# Patient Record
Sex: FEMALE | Race: WHITE | NOT HISPANIC OR LATINO | Employment: UNEMPLOYED | URBAN - METROPOLITAN AREA
[De-identification: names, ages, dates, MRNs, and addresses within clinical notes are randomized per-mention and may not be internally consistent; named-entity substitution may affect disease eponyms.]

---

## 2017-02-27 ENCOUNTER — HOSPITAL ENCOUNTER (OUTPATIENT)
Dept: FAMILY MEDICINE CLINIC | Facility: CLINIC | Age: 57
Setting detail: SPECIMEN
Discharge: HOME | End: 2017-02-27
Attending: NURSE PRACTITIONER | Admitting: NURSE PRACTITIONER

## 2017-02-27 LAB
ALBUMIN SERPL-MCNC: 3.8 G/DL (ref 3.5–4.8)
ALBUMIN/GLOB SERPL: 1.6 {RATIO} (ref 1–1.7)
ALP SERPL-CCNC: 75 IU/L (ref 32–91)
ALT SERPL-CCNC: 26 IU/L (ref 14–54)
ANION GAP SERPL CALC-SCNC: 15.8 MMOL/L (ref 10–20)
AST SERPL-CCNC: 32 IU/L (ref 15–41)
BASOPHILS # BLD AUTO: 0.1 10*3/UL (ref 0–0.2)
BASOPHILS NFR BLD AUTO: 1 % (ref 0–2)
BILIRUB SERPL-MCNC: 0.6 MG/DL (ref 0.3–1.2)
BUN SERPL-MCNC: 9 MG/DL (ref 8–20)
BUN/CREAT SERPL: 11.3 (ref 5.4–26.2)
CALCIUM SERPL-MCNC: 9.5 MG/DL (ref 8.9–10.3)
CHLORIDE SERPL-SCNC: 100 MMOL/L (ref 101–111)
CHOLEST SERPL-MCNC: 156 MG/DL
CHOLEST/HDLC SERPL: 1.8 {RATIO}
CONV CO2: 28 MMOL/L (ref 22–32)
CONV LDL CHOLESTEROL DIRECT: 43 MG/DL (ref 0–100)
CONV TOTAL PROTEIN: 6.2 G/DL (ref 6.1–7.9)
CREAT UR-MCNC: 0.8 MG/DL (ref 0.4–1)
DIFFERENTIAL METHOD BLD: (no result)
EOSINOPHIL # BLD AUTO: 0.2 10*3/UL (ref 0–0.3)
EOSINOPHIL # BLD AUTO: 3 % (ref 0–3)
ERYTHROCYTE [DISTWIDTH] IN BLOOD BY AUTOMATED COUNT: 13.3 % (ref 11.5–14.5)
GLOBULIN UR ELPH-MCNC: 2.4 G/DL (ref 2.5–3.8)
GLUCOSE SERPL-MCNC: 101 MG/DL (ref 65–99)
HCT VFR BLD AUTO: 44.6 % (ref 35–49)
HDLC SERPL-MCNC: 86 MG/DL
HGB BLD-MCNC: 15 G/DL (ref 12–15)
LDLC/HDLC SERPL: 0.5 {RATIO}
LIPID INTERPRETATION: ABNORMAL
LYMPHOCYTES # BLD AUTO: 2.4 10*3/UL (ref 0.8–4.8)
LYMPHOCYTES NFR BLD AUTO: 28 % (ref 18–42)
MAGNESIUM SERPL-MCNC: 1.9 MG/DL (ref 1.8–2.5)
MCH RBC QN AUTO: 32.3 PG (ref 26–32)
MCHC RBC AUTO-ENTMCNC: 33.5 G/DL (ref 32–36)
MCV RBC AUTO: 96.3 FL (ref 80–94)
MONOCYTES # BLD AUTO: 0.7 10*3/UL (ref 0.1–1.3)
MONOCYTES NFR BLD AUTO: 9 % (ref 2–11)
NEUTROPHILS # BLD AUTO: 5.2 10*3/UL (ref 2.3–8.6)
NEUTROPHILS NFR BLD AUTO: 59 % (ref 50–75)
NRBC BLD AUTO-RTO: 0 /100{WBCS}
NRBC/RBC NFR BLD MANUAL: 0 10*3/UL
PLATELET # BLD AUTO: 140 10*3/UL (ref 150–450)
PMV BLD AUTO: 9.9 FL (ref 7.4–10.4)
POTASSIUM SERPL-SCNC: 4.8 MMOL/L (ref 3.6–5.1)
RBC # BLD AUTO: 4.63 10*6/UL (ref 4–5.4)
SODIUM SERPL-SCNC: 139 MMOL/L (ref 136–144)
TRIGL SERPL-MCNC: 111 MG/DL
VIT B12 SERPL-MCNC: >1500 PG/ML (ref 180–914)
VLDLC SERPL CALC-MCNC: 27.3 MG/DL
WBC # BLD AUTO: 8.6 10*3/UL (ref 4.5–11.5)

## 2019-10-31 RX ORDER — TRAZODONE HYDROCHLORIDE 150 MG/1
TABLET ORAL EVERY 24 HOURS
COMMUNITY
Start: 2018-06-19 | End: 2020-05-21 | Stop reason: SDUPTHER

## 2019-10-31 RX ORDER — TRAZODONE HYDROCHLORIDE 150 MG/1
150 TABLET ORAL EVERY 24 HOURS
OUTPATIENT
Start: 2019-10-31

## 2020-05-13 ENCOUNTER — HOSPITAL ENCOUNTER (EMERGENCY)
Facility: HOSPITAL | Age: 60
Discharge: HOME | End: 2020-05-13
Admitting: EMERGENCY MEDICINE

## 2020-05-13 VITALS
OXYGEN SATURATION: 100 % | BODY MASS INDEX: 16.57 KG/M2 | DIASTOLIC BLOOD PRESSURE: 77 MMHG | SYSTOLIC BLOOD PRESSURE: 133 MMHG | HEIGHT: 65 IN | RESPIRATION RATE: 17 BRPM | TEMPERATURE: 97.8 F | HEART RATE: 99 BPM | WEIGHT: 99.43 LBS

## 2020-05-13 DIAGNOSIS — S16.1XXA ACUTE STRAIN OF NECK MUSCLE, INITIAL ENCOUNTER: Primary | ICD-10-CM

## 2020-05-13 PROCEDURE — 99283 EMERGENCY DEPT VISIT LOW MDM: CPT

## 2020-05-13 PROCEDURE — 96374 THER/PROPH/DIAG INJ IV PUSH: CPT

## 2020-05-13 PROCEDURE — 25010000002 KETOROLAC TROMETHAMINE PER 15 MG: Performed by: PHYSICIAN ASSISTANT

## 2020-05-13 PROCEDURE — 25010000002 METHYLPREDNISOLONE PER 125 MG: Performed by: PHYSICIAN ASSISTANT

## 2020-05-13 PROCEDURE — 96375 TX/PRO/DX INJ NEW DRUG ADDON: CPT

## 2020-05-13 RX ORDER — METHYLPREDNISOLONE 4 MG/1
TABLET ORAL
Qty: 21 TABLET | Refills: 0 | Status: SHIPPED | OUTPATIENT
Start: 2020-05-13 | End: 2020-05-21

## 2020-05-13 RX ORDER — KETOROLAC TROMETHAMINE 15 MG/ML
15 INJECTION, SOLUTION INTRAMUSCULAR; INTRAVENOUS ONCE
Status: COMPLETED | OUTPATIENT
Start: 2020-05-13 | End: 2020-05-13

## 2020-05-13 RX ORDER — METHOCARBAMOL 750 MG/1
750 TABLET, FILM COATED ORAL 3 TIMES DAILY
Qty: 45 TABLET | Refills: 0 | Status: SHIPPED | OUTPATIENT
Start: 2020-05-13 | End: 2020-05-21

## 2020-05-13 RX ORDER — LIDOCAINE 50 MG/G
1 PATCH TOPICAL EVERY 24 HOURS
Qty: 30 PATCH | Refills: 0 | Status: SHIPPED | OUTPATIENT
Start: 2020-05-13 | End: 2020-08-04

## 2020-05-13 RX ORDER — DICLOFENAC SODIUM 75 MG/1
75 TABLET, DELAYED RELEASE ORAL 2 TIMES DAILY
Qty: 60 TABLET | Refills: 0 | Status: SHIPPED | OUTPATIENT
Start: 2020-05-13 | End: 2020-05-21

## 2020-05-13 RX ORDER — SODIUM CHLORIDE 0.9 % (FLUSH) 0.9 %
10 SYRINGE (ML) INJECTION AS NEEDED
Status: DISCONTINUED | OUTPATIENT
Start: 2020-05-13 | End: 2020-05-13 | Stop reason: HOSPADM

## 2020-05-13 RX ORDER — METHYLPREDNISOLONE SODIUM SUCCINATE 125 MG/2ML
125 INJECTION, POWDER, LYOPHILIZED, FOR SOLUTION INTRAMUSCULAR; INTRAVENOUS ONCE
Status: COMPLETED | OUTPATIENT
Start: 2020-05-13 | End: 2020-05-13

## 2020-05-13 RX ORDER — LIDOCAINE 50 MG/G
1 PATCH TOPICAL ONCE
Status: DISCONTINUED | OUTPATIENT
Start: 2020-05-13 | End: 2020-05-13 | Stop reason: HOSPADM

## 2020-05-13 RX ORDER — METHOCARBAMOL 500 MG/1
500 TABLET, FILM COATED ORAL ONCE
Status: COMPLETED | OUTPATIENT
Start: 2020-05-13 | End: 2020-05-13

## 2020-05-13 RX ADMIN — LIDOCAINE 1 PATCH: 50 PATCH TOPICAL at 13:42

## 2020-05-13 RX ADMIN — KETOROLAC TROMETHAMINE 15 MG: 15 INJECTION, SOLUTION INTRAMUSCULAR; INTRAVENOUS at 13:42

## 2020-05-13 RX ADMIN — METHYLPREDNISOLONE SODIUM SUCCINATE 125 MG: 125 INJECTION, POWDER, FOR SOLUTION INTRAMUSCULAR; INTRAVENOUS at 13:42

## 2020-05-13 RX ADMIN — METHOCARBAMOL TABLETS 500 MG: 500 TABLET, COATED ORAL at 13:42

## 2020-05-13 NOTE — ED NOTES
Patient c/o left arm pain starting at her neck and radiating to fingers. States that it feels like her arm is going to sleep with a sporadic sharp pain down arm. States this has been happening for approximately 2 weeks.      Melina Sosa RN  05/13/20 0537

## 2020-05-13 NOTE — ED PROVIDER NOTES
Subjective   History: Patient is a 60-year-old female who presents to the ER with left arm pain radiating from her left neck.  She reports she has a history of degenerative disc disease.  She reports she was cleaning 2 weeks ago and feels like she tweaked it.  She reports is tender to touch she has been having her roommate rub it without significant relief.  She also reports it is worse with certain movements.      Onset: 2 weeks  Location: left side neck  Duration: constant  Character: sharp pain   Aggravating/Alleviating factors: worse with certain movements  Radiation Left arm  Severity: moderate            Review of Systems   Constitutional: Negative for chills, fatigue and fever.   HENT: Negative.    Respiratory: Negative for cough and shortness of breath.    Cardiovascular: Negative for chest pain, palpitations and leg swelling.   Gastrointestinal: Negative for abdominal pain, nausea and vomiting.   Genitourinary: Negative.    Musculoskeletal: Positive for neck pain.   Skin: Negative.    Neurological: Negative.    Psychiatric/Behavioral: Negative.        No past medical history on file.    No Known Allergies    No past surgical history on file.    No family history on file.    Social History     Socioeconomic History   • Marital status: Legally      Spouse name: Not on file   • Number of children: Not on file   • Years of education: Not on file   • Highest education level: Not on file           Objective   Physical Exam   Constitutional: She is oriented to person, place, and time. She appears well-developed and well-nourished.   HENT:   Head: Normocephalic and atraumatic.   Eyes: Pupils are equal, round, and reactive to light.   Neck: Normal range of motion.   Cardiovascular: Normal rate and regular rhythm.   Pulmonary/Chest: Effort normal and breath sounds normal.   Musculoskeletal: Normal range of motion.   Increase in pain with palpation over left scapular region and left-sided neck.  Increase in  pain with ROM of left arm.  Subjective numbness to left hand. Negative Hunt's sign   Neurological: She is alert and oriented to person, place, and time.   Skin: Skin is warm and dry.   Psychiatric: She has a normal mood and affect. Her behavior is normal.       Procedures           ED Course    No radiology results for the last day  Labs Reviewed - No data to display  Medications   sodium chloride 0.9 % flush 10 mL (has no administration in time range)   lidocaine (LIDODERM) 5 % 1 patch (1 patch Transdermal Medication Applied 5/13/20 1342)   methylPREDNISolone sodium succinate (SOLU-Medrol) injection 125 mg (125 mg Intravenous Given 5/13/20 1342)   ketorolac (TORADOL) injection 15 mg (15 mg Intravenous Given 5/13/20 1342)   methocarbamol (ROBAXIN) tablet 500 mg (500 mg Oral Given 5/13/20 1342)                                            MDM  Number of Diagnoses or Management Options  Acute strain of neck muscle, initial encounter:   Diagnosis management comments: I examined the patient using the appropriate personal protective equipment.      DISPOSITION:   Chart Review:  Comorbidity:  has no past medical history on file.  Differentials:this list is not all inclusive and does not constitute the entirety of considered causes --> cervical strain, acute exacerbation of chronic neck pain, acute fracture    Imaging: Was interpreted by physician and reviewed by myself:  No radiology results for the last day    Disposition/Treatment:    Patient is a 60-year-old female who presents to the ER with neck pain after cleaning 2 weeks ago she has been taking over-the-counter ibuprofen and Tylenol without relief.  Patient was given IV Solu-Medrol Toradol and Robaxin her pain went out from a 10 out of 10 to a 4 out of 10.  Initially ordered imaging but with her significant relief in pain she felt comfortable going home.  Patient was discharged home with diclofenac Robaxin lidocaine patches and Medrol Dosepak return precaution  follow-up stress provided she was stable at time of discharge agreement with plan    Patient Progress  Patient progress: stable      Final diagnoses:   Acute strain of neck muscle, initial encounter            Rosita Palmer, GALLO  05/13/20 1700

## 2020-05-13 NOTE — DISCHARGE INSTRUCTIONS
Return to the ER for any worsening symptoms.  Follow-up with your primary care doctor for further pain management.  Alternate ice and heat every 15 minutes if helpful.  Do not take other NSAIDs while taking diclofenac.

## 2020-05-21 ENCOUNTER — OFFICE VISIT (OUTPATIENT)
Dept: FAMILY MEDICINE CLINIC | Facility: CLINIC | Age: 60
End: 2020-05-21

## 2020-05-21 VITALS
HEIGHT: 65 IN | WEIGHT: 97.8 LBS | HEART RATE: 94 BPM | OXYGEN SATURATION: 96 % | DIASTOLIC BLOOD PRESSURE: 85 MMHG | TEMPERATURE: 97.3 F | SYSTOLIC BLOOD PRESSURE: 131 MMHG | BODY MASS INDEX: 16.29 KG/M2

## 2020-05-21 DIAGNOSIS — E78.2 MIXED HYPERLIPIDEMIA: ICD-10-CM

## 2020-05-21 DIAGNOSIS — M47.23 OSTEOARTHRITIS OF SPINE WITH RADICULOPATHY, CERVICOTHORACIC REGION: Primary | ICD-10-CM

## 2020-05-21 DIAGNOSIS — M81.8 OTHER OSTEOPOROSIS, UNSPECIFIED PATHOLOGICAL FRACTURE PRESENCE: ICD-10-CM

## 2020-05-21 DIAGNOSIS — Z12.31 BREAST CANCER SCREENING BY MAMMOGRAM: ICD-10-CM

## 2020-05-21 DIAGNOSIS — G47.09 OTHER INSOMNIA: ICD-10-CM

## 2020-05-21 DIAGNOSIS — Z12.11 COLON CANCER SCREENING: ICD-10-CM

## 2020-05-21 DIAGNOSIS — Z00.00 PREVENTATIVE HEALTH CARE: ICD-10-CM

## 2020-05-21 DIAGNOSIS — G56.92 NEUROPATHY, ARM, LEFT: ICD-10-CM

## 2020-05-21 PROCEDURE — 80053 COMPREHEN METABOLIC PANEL: CPT | Performed by: NURSE PRACTITIONER

## 2020-05-21 PROCEDURE — 96372 THER/PROPH/DIAG INJ SC/IM: CPT | Performed by: NURSE PRACTITIONER

## 2020-05-21 PROCEDURE — 80061 LIPID PANEL: CPT | Performed by: NURSE PRACTITIONER

## 2020-05-21 PROCEDURE — 99214 OFFICE O/P EST MOD 30 MIN: CPT | Performed by: NURSE PRACTITIONER

## 2020-05-21 PROCEDURE — 85027 COMPLETE CBC AUTOMATED: CPT | Performed by: NURSE PRACTITIONER

## 2020-05-21 PROCEDURE — 84443 ASSAY THYROID STIM HORMONE: CPT | Performed by: NURSE PRACTITIONER

## 2020-05-21 RX ORDER — HYDROCODONE BITARTRATE AND ACETAMINOPHEN 5; 325 MG/1; MG/1
1 TABLET ORAL EVERY 6 HOURS PRN
Qty: 30 TABLET | Refills: 0 | Status: SHIPPED | OUTPATIENT
Start: 2020-05-21 | End: 2020-05-27 | Stop reason: SDUPTHER

## 2020-05-21 RX ORDER — DICLOFENAC SODIUM 75 MG/1
75 TABLET, DELAYED RELEASE ORAL 2 TIMES DAILY
Qty: 60 TABLET | Refills: 0 | Status: SHIPPED | OUTPATIENT
Start: 2020-05-21 | End: 2020-06-07 | Stop reason: SDUPTHER

## 2020-05-21 RX ORDER — METHOCARBAMOL 750 MG/1
750 TABLET, FILM COATED ORAL 2 TIMES DAILY PRN
Qty: 40 TABLET | Refills: 0 | Status: SHIPPED | OUTPATIENT
Start: 2020-05-21 | End: 2020-05-21

## 2020-05-21 RX ORDER — GABAPENTIN 300 MG/1
CAPSULE ORAL
Qty: 90 CAPSULE | Refills: 0 | Status: SHIPPED | OUTPATIENT
Start: 2020-05-21 | End: 2020-05-26 | Stop reason: SDUPTHER

## 2020-05-21 RX ORDER — TRAZODONE HYDROCHLORIDE 150 MG/1
150 TABLET ORAL NIGHTLY PRN
Qty: 90 TABLET | Refills: 1 | Status: SHIPPED | OUTPATIENT
Start: 2020-05-21 | End: 2020-10-14

## 2020-05-21 RX ORDER — METHOCARBAMOL 750 MG/1
750 TABLET, FILM COATED ORAL 2 TIMES DAILY PRN
Qty: 40 TABLET | Refills: 0 | Status: SHIPPED | OUTPATIENT
Start: 2020-05-21 | End: 2020-05-26 | Stop reason: SDUPTHER

## 2020-05-21 RX ORDER — METHYLPREDNISOLONE SODIUM SUCCINATE 125 MG/2ML
125 INJECTION, POWDER, LYOPHILIZED, FOR SOLUTION INTRAMUSCULAR; INTRAVENOUS EVERY 6 HOURS
Status: DISCONTINUED | OUTPATIENT
Start: 2020-05-21 | End: 2020-05-21

## 2020-05-21 RX ORDER — DICLOFENAC SODIUM 75 MG/1
75 TABLET, DELAYED RELEASE ORAL 2 TIMES DAILY
Qty: 60 TABLET | Refills: 0 | Status: SHIPPED | OUTPATIENT
Start: 2020-05-21 | End: 2020-05-21

## 2020-05-21 RX ORDER — CALCIUM CARBONATE 600 MG
1 TABLET ORAL 2 TIMES DAILY
COMMUNITY
Start: 2020-03-05 | End: 2020-06-08

## 2020-05-21 RX ORDER — ATORVASTATIN CALCIUM 20 MG/1
20 TABLET, FILM COATED ORAL
Qty: 90 TABLET | Refills: 1 | Status: SHIPPED | OUTPATIENT
Start: 2020-05-21 | End: 2020-12-23

## 2020-05-21 RX ORDER — METHYLPREDNISOLONE SODIUM SUCCINATE 125 MG/2ML
125 INJECTION, POWDER, LYOPHILIZED, FOR SOLUTION INTRAMUSCULAR; INTRAVENOUS ONCE
Status: COMPLETED | OUTPATIENT
Start: 2020-05-21 | End: 2020-05-21

## 2020-05-21 RX ORDER — ATORVASTATIN CALCIUM 20 MG/1
20 TABLET, FILM COATED ORAL
COMMUNITY
Start: 2020-03-08 | End: 2020-05-21 | Stop reason: SDUPTHER

## 2020-05-21 RX ADMIN — METHYLPREDNISOLONE SODIUM SUCCINATE 125 MG: 125 INJECTION, POWDER, LYOPHILIZED, FOR SOLUTION INTRAMUSCULAR; INTRAVENOUS at 14:07

## 2020-05-22 ENCOUNTER — RESULTS ENCOUNTER (OUTPATIENT)
Dept: FAMILY MEDICINE CLINIC | Facility: CLINIC | Age: 60
End: 2020-05-22

## 2020-05-22 ENCOUNTER — TELEPHONE (OUTPATIENT)
Dept: FAMILY MEDICINE CLINIC | Facility: CLINIC | Age: 60
End: 2020-05-22

## 2020-05-22 DIAGNOSIS — Z12.11 COLON CANCER SCREENING: ICD-10-CM

## 2020-05-22 LAB
ALBUMIN SERPL-MCNC: 4.7 G/DL (ref 3.5–5.2)
ALBUMIN/GLOB SERPL: 1.9 G/DL
ALP SERPL-CCNC: 51 U/L (ref 39–117)
ALT SERPL W P-5'-P-CCNC: 17 U/L (ref 1–33)
ANION GAP SERPL CALCULATED.3IONS-SCNC: 13.8 MMOL/L (ref 5–15)
AST SERPL-CCNC: 20 U/L (ref 1–32)
BILIRUB SERPL-MCNC: 0.3 MG/DL (ref 0.2–1.2)
BUN BLD-MCNC: 24 MG/DL (ref 8–23)
BUN/CREAT SERPL: 32 (ref 7–25)
CALCIUM SPEC-SCNC: 9.2 MG/DL (ref 8.6–10.5)
CHLORIDE SERPL-SCNC: 101 MMOL/L (ref 98–107)
CHOLEST SERPL-MCNC: 228 MG/DL (ref 0–200)
CO2 SERPL-SCNC: 23.2 MMOL/L (ref 22–29)
CREAT BLD-MCNC: 0.75 MG/DL (ref 0.57–1)
DEPRECATED RDW RBC AUTO: 43.7 FL (ref 37–54)
ERYTHROCYTE [DISTWIDTH] IN BLOOD BY AUTOMATED COUNT: 13.1 % (ref 12.3–15.4)
GFR SERPL CREATININE-BSD FRML MDRD: 79 ML/MIN/1.73
GLOBULIN UR ELPH-MCNC: 2.5 GM/DL
GLUCOSE BLD-MCNC: 93 MG/DL (ref 65–99)
HCT VFR BLD AUTO: 43 % (ref 34–46.6)
HDLC SERPL-MCNC: 103 MG/DL (ref 40–60)
HGB BLD-MCNC: 14.4 G/DL (ref 12–15.9)
LDLC SERPL CALC-MCNC: 88 MG/DL (ref 0–100)
LDLC/HDLC SERPL: 0.85 {RATIO}
MCH RBC QN AUTO: 30.8 PG (ref 26.6–33)
MCHC RBC AUTO-ENTMCNC: 33.5 G/DL (ref 31.5–35.7)
MCV RBC AUTO: 92.1 FL (ref 79–97)
PLATELET # BLD AUTO: 200 10*3/MM3 (ref 140–450)
PMV BLD AUTO: 10.3 FL (ref 6–12)
POTASSIUM BLD-SCNC: 4.5 MMOL/L (ref 3.5–5.2)
PROT SERPL-MCNC: 7.2 G/DL (ref 6–8.5)
RBC # BLD AUTO: 4.67 10*6/MM3 (ref 3.77–5.28)
SODIUM BLD-SCNC: 138 MMOL/L (ref 136–145)
TRIGL SERPL-MCNC: 185 MG/DL (ref 0–150)
TSH SERPL DL<=0.05 MIU/L-ACNC: 26.8 UIU/ML (ref 0.27–4.2)
VLDLC SERPL-MCNC: 37 MG/DL (ref 5–40)
WBC NRBC COR # BLD: 6.84 10*3/MM3 (ref 3.4–10.8)

## 2020-05-22 NOTE — TELEPHONE ENCOUNTER
Pt left message Casandra on your line stating she would like results from her xray.  She said [facility] told her they were sent here already.  I didn't call her back because I know there isn't anything I can tell her today.  Can you please contact patient once you have the results from El[facility].  Thanks bm

## 2020-05-26 ENCOUNTER — TELEPHONE (OUTPATIENT)
Dept: FAMILY MEDICINE CLINIC | Facility: CLINIC | Age: 60
End: 2020-05-26

## 2020-05-26 DIAGNOSIS — M47.23 OSTEOARTHRITIS OF SPINE WITH RADICULOPATHY, CERVICOTHORACIC REGION: ICD-10-CM

## 2020-05-26 DIAGNOSIS — M50.30 DEGENERATION OF INTERVERTEBRAL DISC OF CERVICAL REGION WITH OSTEOPHYTE OF CERVICAL VERTEBRA: ICD-10-CM

## 2020-05-26 DIAGNOSIS — M25.78 DEGENERATION OF INTERVERTEBRAL DISC OF CERVICAL REGION WITH OSTEOPHYTE OF CERVICAL VERTEBRA: ICD-10-CM

## 2020-05-26 DIAGNOSIS — M47.812 FACET ARTHROPATHY, CERVICAL: Primary | ICD-10-CM

## 2020-05-26 DIAGNOSIS — G56.92 NEUROPATHY, ARM, LEFT: ICD-10-CM

## 2020-05-26 RX ORDER — METHOCARBAMOL 750 MG/1
750 TABLET, FILM COATED ORAL 3 TIMES DAILY PRN
Qty: 60 TABLET | Refills: 0 | Status: SHIPPED | OUTPATIENT
Start: 2020-05-26 | End: 2020-06-16

## 2020-05-26 RX ORDER — GABAPENTIN 300 MG/1
CAPSULE ORAL
Qty: 90 CAPSULE | Refills: 0 | Status: SHIPPED | OUTPATIENT
Start: 2020-05-26 | End: 2020-05-31 | Stop reason: DRUGHIGH

## 2020-05-26 RX ORDER — LEVOTHYROXINE SODIUM 0.03 MG/1
25 TABLET ORAL DAILY
Qty: 30 TABLET | Refills: 1 | Status: SHIPPED | OUTPATIENT
Start: 2020-05-26 | End: 2020-07-09

## 2020-05-26 RX ORDER — MELOXICAM 15 MG/1
TABLET ORAL
Qty: 30 TABLET | Refills: 0 | OUTPATIENT
Start: 2020-05-26

## 2020-05-26 NOTE — TELEPHONE ENCOUNTER
She should try fiber, stool softeners, increase water.  The hemorrhoids are likely related to constipation which is related to the hydrocodone, she can also try Preparation H for hemorrhoids OTC and simethicone for the bloating and gas as well.  I did refill her muscle relaxer for 3 times daily as needed, she cannot take 2 at the same time.  I referred her to a Dr. Orozco, neurosurgeon, please try to make that appointment ASAP d/t patient's continued pain.  Thank you

## 2020-05-26 NOTE — TELEPHONE ENCOUNTER
Patient reports that she is having abnormal bloating and gas with constipation and she said that she had a knot on her anus that bled.  She said the knot has since gone away, but she wondered if you have any recommendations for the bloating/gas and asked if she should have a referral to gastro dr for the possible hemorrhoids.    Patient also requested a refill of her methocarbamol and asked if she can take 2 tabs tid.

## 2020-05-27 DIAGNOSIS — M47.23 OSTEOARTHRITIS OF SPINE WITH RADICULOPATHY, CERVICOTHORACIC REGION: ICD-10-CM

## 2020-05-27 DIAGNOSIS — G56.92 NEUROPATHY, ARM, LEFT: ICD-10-CM

## 2020-05-27 RX ORDER — METHYLPREDNISOLONE 4 MG/1
TABLET ORAL
Qty: 21 TABLET | Refills: 0 | Status: SHIPPED | OUTPATIENT
Start: 2020-05-27 | End: 2020-07-01

## 2020-05-27 RX ORDER — HYDROCODONE BITARTRATE AND ACETAMINOPHEN 5; 325 MG/1; MG/1
1 TABLET ORAL EVERY 6 HOURS PRN
Qty: 30 TABLET | Refills: 0 | Status: SHIPPED | OUTPATIENT
Start: 2020-05-27 | End: 2020-07-01

## 2020-05-27 NOTE — TELEPHONE ENCOUNTER
Please make sure she has started the gabapentin. I have sent medrol dose pack as well. She can not take more than 4 norco/day. Needs to stretch these out for at least 2 weeks, they are for last resort only, and also use ibuprofen or other nsaids, heat/ice, etc.

## 2020-05-27 NOTE — TELEPHONE ENCOUNTER
XR results were scanned to an MRI order in this patient's chart.  Please submit a request to Epic support to fix.  Thanks.

## 2020-05-31 ENCOUNTER — TELEPHONE (OUTPATIENT)
Dept: FAMILY MEDICINE CLINIC | Facility: CLINIC | Age: 60
End: 2020-05-31

## 2020-05-31 DIAGNOSIS — G56.92 NEUROPATHY, ARM, LEFT: ICD-10-CM

## 2020-05-31 DIAGNOSIS — M47.23 OSTEOARTHRITIS OF SPINE WITH RADICULOPATHY, CERVICOTHORACIC REGION: ICD-10-CM

## 2020-05-31 RX ORDER — GABAPENTIN 600 MG/1
600 TABLET ORAL 3 TIMES DAILY
Qty: 90 TABLET | Refills: 1 | Status: SHIPPED | OUTPATIENT
Start: 2020-05-31 | End: 2020-07-22 | Stop reason: SDUPTHER

## 2020-05-31 NOTE — TELEPHONE ENCOUNTER
Patient called this morning with severe pain in her neck.  She is out of her gabapentin and her hydrocodone is not working for her, and she has taken it all up, having been doubling it . She is seeking a referral to pain management as injections have worked for her in the past. I changed her gabapentin to 600 mg but told her to reach out to you for the referral. She has been advised that she needs to reach out to your office Monday.

## 2020-06-01 ENCOUNTER — TELEPHONE (OUTPATIENT)
Dept: FAMILY MEDICINE CLINIC | Facility: CLINIC | Age: 60
End: 2020-06-01

## 2020-06-01 NOTE — TELEPHONE ENCOUNTER
Patient called about her pain and said that the medication isn't helping.  I spoke to michelle who said she can't prescribe any more pain medication.  She suggested that she go to the ER if it gets too bad, but she needs to take her medication the way it was prescribed. Patient notified and indicated understanding.

## 2020-06-02 ENCOUNTER — TELEPHONE (OUTPATIENT)
Dept: NEUROSURGERY | Facility: CLINIC | Age: 60
End: 2020-06-02

## 2020-06-03 ENCOUNTER — TELEPHONE (OUTPATIENT)
Dept: FAMILY MEDICINE CLINIC | Facility: CLINIC | Age: 60
End: 2020-06-03

## 2020-06-03 ENCOUNTER — TELEPHONE (OUTPATIENT)
Dept: NEUROSURGERY | Facility: CLINIC | Age: 60
End: 2020-06-03

## 2020-06-03 NOTE — TELEPHONE ENCOUNTER
Patient called and really needed a call back to discuss her situation and medication needed.  She also said Dr. Sanders told her some things to tell Elise?  Casandra could you please! Giver her a call and I also told her that after you speak to her since you are familiar with the medications and I am not, that if she still felt she needed a V V via DOX we would try to get this on schedule for her.  Thank you so much. HERVE

## 2020-06-04 ENCOUNTER — DOCUMENTATION (OUTPATIENT)
Dept: FAMILY MEDICINE CLINIC | Facility: CLINIC | Age: 60
End: 2020-06-04

## 2020-06-04 ENCOUNTER — TELEPHONE (OUTPATIENT)
Dept: FAMILY MEDICINE CLINIC | Facility: CLINIC | Age: 60
End: 2020-06-04

## 2020-06-05 ENCOUNTER — TELEPHONE (OUTPATIENT)
Dept: FAMILY MEDICINE CLINIC | Facility: CLINIC | Age: 60
End: 2020-06-05

## 2020-06-05 NOTE — TELEPHONE ENCOUNTER
Michell Olivares called me very upset today, she stated she in so much pain, she needs some help.  She was very  upset about a few things and explained that she was  that she is in pain and I don't have a provider here that fills in for you?  I told her no, but would put a detailed note into you.  She wants you to give her a call.  I advised you were on vacation and you were checking messages periodically and I could not promise her that you would see this or respond today.  She had results back from her MRI and said that she has  Compression at the C4 and C5 and it is pushing on her spine and she is hurting.  She asked for Dr Mata's number and I just tried to handle this the best I could, she wants you to give her a call and I told her that I would put that message into you. Thanks BM

## 2020-06-05 NOTE — TELEPHONE ENCOUNTER
Elise this patient has a positive Cologuard result, however the system will not let me release it and match it to the order you put in.  I have contacted Epic and they didn't know how at this point.  So they have a ticket in on this result,however I wanted you to know this right away for the patient.  I printed it out for now and put it on your desk. BM

## 2020-06-06 NOTE — TELEPHONE ENCOUNTER
Received a page regarding this patient while on call tonight.  I called her within a few minutes and she promptly hung up after I identified myself.    I had spoken to her 3 nights earlier when I was on call then, too.  She was calling about neck pain and requesting opioids.  I advised her that on call physicians at night or not in a position to treat pain such as she was describing.  I advised her to contact her primary and if her pain was so severe it could not wait, that she should go to the emergency room.

## 2020-06-07 ENCOUNTER — TELEPHONE (OUTPATIENT)
Dept: FAMILY MEDICINE CLINIC | Facility: CLINIC | Age: 60
End: 2020-06-07

## 2020-06-07 DIAGNOSIS — M47.23 OSTEOARTHRITIS OF SPINE WITH RADICULOPATHY, CERVICOTHORACIC REGION: ICD-10-CM

## 2020-06-07 RX ORDER — DICLOFENAC SODIUM 75 MG/1
75 TABLET, DELAYED RELEASE ORAL 2 TIMES DAILY
Qty: 60 TABLET | Refills: 0 | Status: SHIPPED | OUTPATIENT
Start: 2020-06-07 | End: 2020-07-01 | Stop reason: SDUPTHER

## 2020-06-07 NOTE — TELEPHONE ENCOUNTER
Received third call to me alone since Tuesday about her neck pain.  I am unable to identify the MRI of her neck she references.  I have advised her on 2 other occasions of the pain is that severe she should go to the emergency room.  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  It looks like there have been daily phone calls to the primary's office and to on-call doctors over the past week.  Today she requested a refill on gabapentin, methocarbamol, and diclofenac.  She told me she was taking 3 gabapentin at a time 3 times a day along with 2 diclofenac and 2 methocarbamol 3 times a day.  According to the prescriptions she should have some of each of these medications according to when they were prescribed.  I told her medications should only be taken according to the label and I told her not to double and triple the doses of the medicines as she has been doing.  I did agree to refill her diclofenac on the condition that she would reduce its use to only twice a day.  She will contact her primary's office tomorrow morning.

## 2020-06-08 ENCOUNTER — TELEPHONE (OUTPATIENT)
Dept: FAMILY MEDICINE CLINIC | Facility: CLINIC | Age: 60
End: 2020-06-08

## 2020-06-08 DIAGNOSIS — R19.5 POSITIVE COLORECTAL CANCER SCREENING USING COLOGUARD TEST: Primary | ICD-10-CM

## 2020-06-08 NOTE — TELEPHONE ENCOUNTER
Spoke with Dr. Orozco' office.  The representative said their referral department has to review the referral and they will call the patient to schedule the appointment.  I asked her how long it would take and she said she didn't know.

## 2020-06-08 NOTE — TELEPHONE ENCOUNTER
"Spoke with patient.  She is having issues getting scheduled with Dr. Orozco' office.  She reports that \"they will not give her an appointment.\"  Currently on hold with Dr. Orozco' office.   "

## 2020-06-08 NOTE — TELEPHONE ENCOUNTER
No we are not going to fill her hydrocodone and yes you should forward this on to Elise for her to review  If the patient is either asked to leave that office or chooses to leave that office, I will not accept her as a new patient and would advocate she not be accepted as a new patient in this office   No

## 2020-06-08 NOTE — TELEPHONE ENCOUNTER
Discussed entire situation with Dr. Mata today, he agrees and supports no further narcotics. Pt can continue gabapentin, diclofenac and robaxin and must take appropriately, she must see neurosurgery asap, awaiting on Dr. Orozco office to make appt, all MRI's/Xray reports have been faxed for their review.

## 2020-06-08 NOTE — TELEPHONE ENCOUNTER
Please call regarding me medication, she needs a refill and the patient states [facility] Jeremy will not refill

## 2020-06-08 NOTE — TELEPHONE ENCOUNTER
I have been in contact with this patient all day, and so has the other staff members in my office.  I have instructed her that we are working on her appointment with Dr. Orozco, and no I will not refill her hydrocodone because she provided #60, two total rx and she took too many and now she is out, we have increased her gabapentin, she is taking 2 to 3 600mg tabs at a time, we have also provided Robaxin, and she is also taking 2-3 at a time, I told her today she can not take the amount of gabapentin and robaxin she is taking. Diclofenac was also refilled yesterday. I understand she is in a tremendous amount of pain, I have instructed her to go to the ER if her pain is unbearable/intolerable and maybe Dr. Orozco or team will see her there.  She has MRIs and x-rays completed at [facility] that are available for review in the chart, we are waiting on Dr. Orozco office to evaluate and review her information to get her an appointment ASAP.  Thank you for forwarding me the information.

## 2020-06-08 NOTE — TELEPHONE ENCOUNTER
I called pt today to discuss ongoing /severe neck pain, she has called on call service several times over the last week, Pt is overusing diclofenac, gabapentin and robaxin, she has now completed two rounds of norco 5/325mg #30. She reports she hasn't heard from Dr. Orozco office for the appt, she truly is in severe pain and can not get relief, she is sleeping upright. The MRI has been reviewed of T spine, and I requested C-spine MRI but not available for review still, but was done same time of T-spine. Can you please call [facility] for c-spine MRI report. (There is an xray report scanned into the c-spine mri spot...) all xrays, MRI's need faxed to Dr. Orozco, pt likely needs to get the disc as well and she needs appt to see Dr. Orozco asa, I referred her on 5/26. Thanks.

## 2020-06-08 NOTE — TELEPHONE ENCOUNTER
Patient called upset because [facility] will not refill her hydrocodone. She is calling you since she spoke with you previously when you was on call. Says she is in tremendous pain and doesn't know what to do. Also, has a call in to Dr. Mata. I don't know if you want me to forward to [facility]. Please advise.

## 2020-06-09 ENCOUNTER — TELEPHONE (OUTPATIENT)
Dept: FAMILY MEDICINE CLINIC | Facility: CLINIC | Age: 60
End: 2020-06-09

## 2020-06-09 NOTE — TELEPHONE ENCOUNTER
If the patient calls back she needs to be directed to call Dr. Orozco office, there is nothing more at our office we can do currently unless she needs appropriate medication refills, which she shouldn't need until the end of this month.

## 2020-06-09 NOTE — TELEPHONE ENCOUNTER
Patient called left  stating her results from cologuard were positive, she wants a call back to see what exactly that means.  She asked for a call back today. Thanks bm

## 2020-06-11 ENCOUNTER — TELEPHONE (OUTPATIENT)
Dept: FAMILY MEDICINE CLINIC | Facility: CLINIC | Age: 60
End: 2020-06-11

## 2020-06-11 RX ORDER — FLUCONAZOLE 150 MG/1
150 TABLET ORAL ONCE
Qty: 1 TABLET | Refills: 0 | Status: SHIPPED | OUTPATIENT
Start: 2020-06-11 | End: 2020-06-11

## 2020-06-11 NOTE — TELEPHONE ENCOUNTER
I sent Diflucan to the pharmacy, unsure as to why patient would have developed thrush, she has not been on an antibiotic.  She can also try antibacterial mouthwash such as Listerine.

## 2020-06-11 NOTE — TELEPHONE ENCOUNTER
She also called 2 more times today.  Just wanted you to  Know Casandra, I think you mentioned you called her but just wanted to make sure. Thanks

## 2020-06-11 NOTE — TELEPHONE ENCOUNTER
I spoke to patient, MRI @ [facility]  
[facility], PATIENT CALLED BACK.  I TRIED TO WARM TRANSFER, NO ANS.  PLEASE CALL PATIENT BACK    [phone]  
PATIENT RETURNED CALL TO ROSIE ABOUT SCHEDULING. ATTEMPTED TO WARM TRANSFER AT 2:33PM BUT NO ANSWER. UNABLE TO COMPLETE TASK AT TIME OF CALL DUE TO LIMITED SCHEDULE AVAILABLITY.    PLEASE CONTACT PATIENT -[phone] FOR FURTHER ASSISTANCE  
Patient needs follow up
Please call patient back. 
SCHEDULED PATIENT WITH LISA SAAVEDRA TELEPHONE VISIT  
Detail Level: Generalized
Detail Level: Zone
Detail Level: Detailed

## 2020-06-11 NOTE — TELEPHONE ENCOUNTER
Pt called today at 3:00 or after and left a vm that said Sera from another office was going to call her and she would go from there.  I'm not sure if she thought she had the ma line or just wanted that message left.  So I wanted to send it to you bm

## 2020-06-12 DIAGNOSIS — M47.23 OSTEOARTHRITIS OF SPINE WITH RADICULOPATHY, CERVICOTHORACIC REGION: ICD-10-CM

## 2020-06-16 RX ORDER — METHOCARBAMOL 750 MG/1
750 TABLET, FILM COATED ORAL 3 TIMES DAILY PRN
Qty: 60 TABLET | Refills: 0 | Status: SHIPPED | OUTPATIENT
Start: 2020-06-16 | End: 2020-07-01 | Stop reason: SDUPTHER

## 2020-06-17 ENCOUNTER — TELEPHONE (OUTPATIENT)
Dept: FAMILY MEDICINE CLINIC | Facility: CLINIC | Age: 60
End: 2020-06-17

## 2020-06-17 NOTE — TELEPHONE ENCOUNTER
"Pt reports that the appointment on July 1st isn't an actual appt.  She said \"someone is going to call to talk to her.\"  I asked her if it is a telephone visit like telehealth and she said no.  She said she called Dr Kaufman to see if she can get in quicker.  I told her to let us know what she finds out and also to call Dr. Orozco' office to add her name to a cancellation list if there is one.  "

## 2020-06-19 ENCOUNTER — TELEPHONE (OUTPATIENT)
Dept: FAMILY MEDICINE CLINIC | Facility: CLINIC | Age: 60
End: 2020-06-19

## 2020-06-19 NOTE — TELEPHONE ENCOUNTER
Patient left  on ma line aksing about a referral that was suppose to be sent (somewhere) else, she stated she was having a bad day and wanted to check on it.  I didn't get a chance to call her back. Could you please contact her and advise of the referral? Thanks bm

## 2020-06-22 NOTE — TELEPHONE ENCOUNTER
He is a neurologist... Does she want pain management? I guess she needs to provide the name/number of where she wants to go. Dr. Orozco can likely do injections and other treatments prior to surgical intervention or recommend where she should go.

## 2020-06-22 NOTE — TELEPHONE ENCOUNTER
Spoke with patient, she reports that she wants to see Dr Kaufman.  She said he is not a neurosurgeon, but she would like to try to solve her issues without surgery.  Is this okay with you?

## 2020-06-23 NOTE — TELEPHONE ENCOUNTER
She needs an appointment, I will discuss and refill medications that she needs if she is due.  Pain management as needed take months to get into

## 2020-06-23 NOTE — TELEPHONE ENCOUNTER
Spoke to patient today about the referral.  She said she knows that Dr. Kaufman doesn't give pain injections or anything similar.  I told her to call Dr. Orozco office to see if they've had any cancellations.  She said she is in extreme pain and was in tears when I spoke to her.  She asked if she could be referred to a pain mgmt dr while she's waiting for Dr. Orozco if you will not send in more medication for her pain.

## 2020-06-25 ENCOUNTER — TELEPHONE (OUTPATIENT)
Dept: FAMILY MEDICINE CLINIC | Facility: CLINIC | Age: 60
End: 2020-06-25

## 2020-06-25 NOTE — TELEPHONE ENCOUNTER
I sent nystatin instead.  The Diflucan is not taking care of the problem. she needs to swish and swallow it 4 times a day until symptoms resolve, continue for 48 hours after symptoms resolve then may discontinue it.

## 2020-07-01 ENCOUNTER — OFFICE VISIT (OUTPATIENT)
Dept: NEUROSURGERY | Facility: CLINIC | Age: 60
End: 2020-07-01

## 2020-07-01 ENCOUNTER — OFFICE VISIT (OUTPATIENT)
Dept: FAMILY MEDICINE CLINIC | Facility: CLINIC | Age: 60
End: 2020-07-01

## 2020-07-01 VITALS
BODY MASS INDEX: 16.86 KG/M2 | TEMPERATURE: 97.3 F | WEIGHT: 101.2 LBS | SYSTOLIC BLOOD PRESSURE: 153 MMHG | HEART RATE: 97 BPM | OXYGEN SATURATION: 98 % | HEIGHT: 65 IN | DIASTOLIC BLOOD PRESSURE: 87 MMHG

## 2020-07-01 DIAGNOSIS — M47.22 SPONDYLOSIS OF CERVICAL SPINE WITH RADICULOPATHY: ICD-10-CM

## 2020-07-01 DIAGNOSIS — G56.92 NEUROPATHY, ARM, LEFT: ICD-10-CM

## 2020-07-01 DIAGNOSIS — B37.0 THRUSH OF MOUTH AND ESOPHAGUS (HCC): ICD-10-CM

## 2020-07-01 DIAGNOSIS — B37.81 THRUSH OF MOUTH AND ESOPHAGUS (HCC): ICD-10-CM

## 2020-07-01 DIAGNOSIS — M54.2 CERVICALGIA: Primary | ICD-10-CM

## 2020-07-01 DIAGNOSIS — M25.78 OSTEOPHYTE OF CERVICAL SPINE: ICD-10-CM

## 2020-07-01 DIAGNOSIS — M47.23 OSTEOARTHRITIS OF SPINE WITH RADICULOPATHY, CERVICOTHORACIC REGION: Primary | ICD-10-CM

## 2020-07-01 DIAGNOSIS — E03.9 ACQUIRED HYPOTHYROIDISM: ICD-10-CM

## 2020-07-01 DIAGNOSIS — M50.30 DDD (DEGENERATIVE DISC DISEASE), CERVICAL: ICD-10-CM

## 2020-07-01 DIAGNOSIS — M47.812 FACET ARTHROPATHY, CERVICAL: ICD-10-CM

## 2020-07-01 PROCEDURE — 99213 OFFICE O/P EST LOW 20 MIN: CPT | Performed by: NURSE PRACTITIONER

## 2020-07-01 PROCEDURE — 84443 ASSAY THYROID STIM HORMONE: CPT | Performed by: NURSE PRACTITIONER

## 2020-07-01 PROCEDURE — 99214 OFFICE O/P EST MOD 30 MIN: CPT | Performed by: NURSE PRACTITIONER

## 2020-07-01 RX ORDER — DICLOFENAC SODIUM 75 MG/1
75 TABLET, DELAYED RELEASE ORAL 2 TIMES DAILY
Qty: 60 TABLET | Refills: 2 | Status: SHIPPED | OUTPATIENT
Start: 2020-07-01 | End: 2020-08-31

## 2020-07-01 RX ORDER — HYDROCODONE BITARTRATE AND ACETAMINOPHEN 7.5; 325 MG/1; MG/1
1 TABLET ORAL EVERY 8 HOURS PRN
Qty: 60 TABLET | Refills: 0 | Status: SHIPPED | OUTPATIENT
Start: 2020-07-01 | End: 2020-07-22 | Stop reason: SDUPTHER

## 2020-07-01 RX ORDER — METHOCARBAMOL 750 MG/1
750 TABLET, FILM COATED ORAL 4 TIMES DAILY PRN
Qty: 120 TABLET | Refills: 0 | Status: SHIPPED | OUTPATIENT
Start: 2020-07-01 | End: 2020-07-30

## 2020-07-01 NOTE — PROGRESS NOTES
Michell Mckeon is a 60 y.o. female.     Chief Complaint   Patient presents with   • Neck Pain       Neck Pain    The current episode started more than 1 month ago. The problem occurs constantly. The problem has been waxing and waning. The pain is present in the midline. The quality of the pain is described as shooting. The pain is at a severity of 8/10. The symptoms are aggravated by stress. The pain is same all the time. Associated symptoms include numbness ( LUE) and tingling. Pertinent negatives include no chest pain, fever, headaches, leg pain, pain with swallowing, paresis, photophobia, syncope, trouble swallowing, visual change, weakness or weight loss. She has tried ice, heat, muscle relaxants and oral narcotics for the symptoms. The treatment provided mild relief.   Patient is appropriate in the office today, in pain but pleasant.  Has appointment with neurosurgery this afternoon for evaluation.  She has ran out of hydrocodone, inspect reviewed and she has received #30 over the last 30 days, she is taking gabapentin 600 mg 3 times daily, Robaxin 4 times daily and diclofenac which has been somewhat effective at keeping her pain tolerable.    MRI cervical spine without contrast 6/4/2020   Findings: There is disc desiccation and disc space narrowing throughout the entire cervical spine.  There are reactive degenerative endplate marrow changes with edema at the C5/6 level.  There is normal signal within other cervical vertebral bodies.  Normal signal within the substance of the spinal cord.  Craniovertebral junction is within normal limits.    Impression: Multilevel degenerative disc disease and degenerative facet changes throughout the cervical spine.  Moderate canal stenosis at the C4-5 and C5-6 levels.  There is also moderate to severe multilevel neural foraminal narrowing see full report for further details    Subjective     Visit Vitals  /87 (BP Location: Right arm, Patient Position: Sitting, Cuff  "Size: Small Adult)   Pulse 97   Temp 97.3 °F (36.3 °C) (Skin)   Ht 165.1 cm (65\")   Wt 45.9 kg (101 lb 3.2 oz)   SpO2 98%   BMI 16.84 kg/m²       The following portions of the patient's history were reviewed and updated as appropriate: allergies, current medications, past family history, past medical history, past social history, past surgical history and problem list.    Review of Systems   Constitutional: Negative for fatigue, fever and unexpected weight loss.   HENT: Positive for swollen glands (R neck). Negative for trouble swallowing.    Eyes: Negative for photophobia.   Cardiovascular: Negative for chest pain and syncope.   Gastrointestinal: Positive for diarrhea.   Musculoskeletal: Positive for arthralgias, back pain (neck), neck pain and neck stiffness.   Neurological: Positive for tingling and numbness ( LUE). Negative for weakness.   Psychiatric/Behavioral: Positive for depressed mood and stress. Negative for suicidal ideas. The patient is nervous/anxious.        Objective     Physical Exam   Constitutional: She is oriented to person, place, and time. She appears well-developed and well-nourished. No distress.   HENT:   Head: Normocephalic.   Right anterior cervical lymph node enlargement. small amt of White patchy exudate over tongue and oropharynx.    Eyes: Pupils are equal, round, and reactive to light. Conjunctivae are normal.   Neck: Normal range of motion. Neck supple. No JVD present. No thyromegaly present.   Cardiovascular: Normal rate, regular rhythm and normal heart sounds.   No murmur heard.  Pulmonary/Chest: Effort normal and breath sounds normal. No respiratory distress.   Abdominal: Soft. Bowel sounds are normal. She exhibits no distension. There is no tenderness.   Musculoskeletal: Normal range of motion. She exhibits tenderness (c-spine severe ttp with radiation into LUE, severe melchor rom). She exhibits no edema.   Neurological: She is alert and oriented to person, place, and time. A sensory " deficit is present.   Skin: Skin is warm and dry. No rash noted. She is not diaphoretic. No erythema.   Psychiatric: She has a normal mood and affect. Her behavior is normal. Judgment normal.         Assessment/Plan   Michell was seen today for neck pain.    Diagnoses and all orders for this visit:    Osteoarthritis of spine with radiculopathy, cervicothoracic region  -     diclofenac (VOLTAREN) 75 MG EC tablet; Take 1 tablet by mouth 2 (Two) Times a Day.  -     methocarbamol (ROBAXIN) 750 MG tablet; Take 1 tablet by mouth 4 (Four) Times a Day As Needed for Muscle Spasms.  -     HYDROcodone-acetaminophen (Norco) 7.5-325 MG per tablet; Take 1 tablet by mouth Every 8 (Eight) Hours As Needed for Severe Pain .    Facet arthropathy, cervical  -     HYDROcodone-acetaminophen (Norco) 7.5-325 MG per tablet; Take 1 tablet by mouth Every 8 (Eight) Hours As Needed for Severe Pain .    Osteophyte of cervical spine    Neuropathy, arm, left    Acquired hypothyroidism  -     TSH    Thrush of mouth and esophagus (CMS/HCC)      Pt has appt with neurosurg today  rf gabapentin when needed, inspect reviewed and appropriate, rf norco and inc to 7.5/325 today, CSA completed, rf robaxin prn, will eventually wean down and stop once pain improved.   TSH today and notify results, will rf/adjust levo when results reviewed  Continue nystatin swish/swallow until s/ss rsolved  For 48 hours.   rtc in 6 weeks or earlier if needed           Glucose   Date Value Ref Range Status   05/21/2020 93 65 - 99 mg/dL Final     BUN   Date Value Ref Range Status   05/21/2020 24 (H) 8 - 23 mg/dL Final     Creatinine   Date Value Ref Range Status   05/21/2020 0.75 0.57 - 1.00 mg/dL Final     Sodium   Date Value Ref Range Status   05/21/2020 138 136 - 145 mmol/L Final     Potassium   Date Value Ref Range Status   05/21/2020 4.5 3.5 - 5.2 mmol/L Final     Chloride   Date Value Ref Range Status   05/21/2020 101 98 - 107 mmol/L Final     CO2   Date Value Ref Range  Status   05/21/2020 23.2 22.0 - 29.0 mmol/L Final     Calcium   Date Value Ref Range Status   05/21/2020 9.2 8.6 - 10.5 mg/dL Final     Total Protein   Date Value Ref Range Status   05/21/2020 7.2 6.0 - 8.5 g/dL Final     Albumin   Date Value Ref Range Status   05/21/2020 4.70 3.50 - 5.20 g/dL Final     ALT (SGPT)   Date Value Ref Range Status   05/21/2020 17 1 - 33 U/L Final     AST (SGOT)   Date Value Ref Range Status   05/21/2020 20 1 - 32 U/L Final     Alkaline Phosphatase   Date Value Ref Range Status   05/21/2020 51 39 - 117 U/L Final     Total Bilirubin   Date Value Ref Range Status   05/21/2020 0.3 0.2 - 1.2 mg/dL Final     eGFR Non  Amer   Date Value Ref Range Status   05/21/2020 79 >60 mL/min/1.73 Final     A/G Ratio   Date Value Ref Range Status   02/27/2017 1.6 1.0 - 1.7 Final     BUN/Creatinine Ratio   Date Value Ref Range Status   05/21/2020 32.0 (H) 7.0 - 25.0 Final     Anion Gap   Date Value Ref Range Status   05/21/2020 13.8 5.0 - 15.0 mmol/L Final
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
## 2020-07-02 LAB — TSH SERPL DL<=0.05 MIU/L-ACNC: 22.6 UIU/ML (ref 0.27–4.2)

## 2020-07-09 RX ORDER — LEVOTHYROXINE SODIUM 0.07 MG/1
75 TABLET ORAL DAILY
Qty: 30 TABLET | Refills: 1 | Status: SHIPPED | OUTPATIENT
Start: 2020-07-09 | End: 2020-08-17

## 2020-07-13 DIAGNOSIS — M47.23 OSTEOARTHRITIS OF SPINE WITH RADICULOPATHY, CERVICOTHORACIC REGION: ICD-10-CM

## 2020-07-13 DIAGNOSIS — G56.92 NEUROPATHY, ARM, LEFT: ICD-10-CM

## 2020-07-21 ENCOUNTER — TELEPHONE (OUTPATIENT)
Dept: FAMILY MEDICINE CLINIC | Facility: CLINIC | Age: 60
End: 2020-07-21

## 2020-07-21 NOTE — TELEPHONE ENCOUNTER
Pt reports that she is out of pain medication and is taking the gabapentin like she should.  She has an appt with Dr. Orozco on 8/24.  She requested more medication until she can see Dr. Orozco.

## 2020-07-22 ENCOUNTER — TELEPHONE (OUTPATIENT)
Dept: NEUROSURGERY | Facility: CLINIC | Age: 60
End: 2020-07-22

## 2020-07-22 ENCOUNTER — TELEPHONE (OUTPATIENT)
Dept: FAMILY MEDICINE CLINIC | Facility: CLINIC | Age: 60
End: 2020-07-22

## 2020-07-22 DIAGNOSIS — M47.812 FACET ARTHROPATHY, CERVICAL: ICD-10-CM

## 2020-07-22 DIAGNOSIS — M47.23 OSTEOARTHRITIS OF SPINE WITH RADICULOPATHY, CERVICOTHORACIC REGION: ICD-10-CM

## 2020-07-22 RX ORDER — GABAPENTIN 600 MG/1
600 TABLET ORAL 3 TIMES DAILY
Qty: 90 TABLET | Refills: 1 | Status: SHIPPED | OUTPATIENT
Start: 2020-07-22 | End: 2020-09-25

## 2020-07-22 RX ORDER — HYDROCODONE BITARTRATE AND ACETAMINOPHEN 7.5; 325 MG/1; MG/1
1 TABLET ORAL EVERY 8 HOURS PRN
Qty: 60 TABLET | Refills: 0 | Status: SHIPPED | OUTPATIENT
Start: 2020-07-22 | End: 2020-08-04 | Stop reason: SDUPTHER

## 2020-07-22 NOTE — TELEPHONE ENCOUNTER
PT CALLED/STATES SHE HAS AN APPT ON 08/24/2020/PT STATES SHE IS IN SEVERE PAIN AND IS REQUESTING THAT THE APPT BE MOVED UP/STATES THAT SHE RECEIVES HER PAIN MEDICINE FROM PCP (TAI VELA) AND SHE IS WAITING FOR REFILL. PT STATES SHE CAN'T TOLERATE THE BACK PAIN THAT SHOOTS DOWN HER LEFT ARM AND IT IS UNABLE TO GET FULL USE OF THE ARM DUE TO THE PAIN.    PLEASE CONTACT PT AT: [phone]    THANK YOU!

## 2020-07-23 ENCOUNTER — TELEPHONE (OUTPATIENT)
Dept: NEUROSURGERY | Facility: CLINIC | Age: 60
End: 2020-07-23

## 2020-07-23 ENCOUNTER — TELEPHONE (OUTPATIENT)
Dept: FAMILY MEDICINE CLINIC | Facility: CLINIC | Age: 60
End: 2020-07-23

## 2020-07-23 NOTE — TELEPHONE ENCOUNTER
"Pt called today crying and distressed saying that it is \"ridiculous that she has to wait this long for Dr. Orozco office and she wants to be referred to someone else.\"    "

## 2020-07-23 NOTE — TELEPHONE ENCOUNTER
Order faxed for xrays to [facility].  She can go whenever she wants, no appointment needed.  Appointment can not be moved up at this point

## 2020-07-23 NOTE — TELEPHONE ENCOUNTER
She had a video visit with them, I am not sure what the issue is either. Can you call them and check what is going on?

## 2020-07-23 NOTE — TELEPHONE ENCOUNTER
Provider: LISA BOND  Caller: PATIENT  Reason for Call: PATIENT COMPLAINS OF SEVERE PAIN INTERFERING WITH DAILY ACTIVITIES & MOBILITY. PATIENT INSISTING TO MOVE FORWARD WITH MANAGING PAIN WITHOUT MEDS. PATIENT REQUESTING TO BE SEEN SOONER THAN SCHEDULED APPT ON 08/24. PATIENT ALSO PREFERS TO COMPLETE XR AT [facility].   When was the patient last seen: 07/01/20  When did it start: SINCE April   Where is it located: NECK, SHOULDERS, ARMS  Characteristics of symptom/severity: SEVERE SHARP PAIN, NUMBNESS & TINGLING  Timing- Is it constant or intermittent: CONSTANT  What makes it worse: MOVING   What makes it better: MEDS  What therapies/medications have you tried: GABAPETIN & ANTI-INFLAMMATORY    PATIENT CAN BE CONTACTED -[phone] FOR FURTHER INSTRUCTION

## 2020-07-23 NOTE — TELEPHONE ENCOUNTER
Dr. Orozco' office said they will send a message to her provider and they will give her a call.  I told her that when she has episodes, she needs to contact Dr. Orozco' office as well.  Dr. Orozco' office was unaware of the increase in her pain.

## 2020-07-27 ENCOUNTER — TELEPHONE (OUTPATIENT)
Dept: FAMILY MEDICINE CLINIC | Facility: CLINIC | Age: 60
End: 2020-07-27

## 2020-07-30 DIAGNOSIS — M47.23 OSTEOARTHRITIS OF SPINE WITH RADICULOPATHY, CERVICOTHORACIC REGION: ICD-10-CM

## 2020-07-30 RX ORDER — METHOCARBAMOL 750 MG/1
TABLET, FILM COATED ORAL
Qty: 60 TABLET | Refills: 0 | Status: SHIPPED | OUTPATIENT
Start: 2020-07-30 | End: 2020-08-21

## 2020-08-04 ENCOUNTER — OFFICE VISIT (OUTPATIENT)
Dept: FAMILY MEDICINE CLINIC | Facility: CLINIC | Age: 60
End: 2020-08-04

## 2020-08-04 VITALS
BODY MASS INDEX: 16.03 KG/M2 | SYSTOLIC BLOOD PRESSURE: 154 MMHG | HEART RATE: 96 BPM | WEIGHT: 96.2 LBS | DIASTOLIC BLOOD PRESSURE: 93 MMHG | TEMPERATURE: 98 F | HEIGHT: 65 IN | OXYGEN SATURATION: 97 %

## 2020-08-04 DIAGNOSIS — E78.2 MIXED HYPERLIPIDEMIA: ICD-10-CM

## 2020-08-04 DIAGNOSIS — M79.641 PAIN IN BOTH HANDS: Primary | ICD-10-CM

## 2020-08-04 DIAGNOSIS — M47.812 FACET ARTHROPATHY, CERVICAL: ICD-10-CM

## 2020-08-04 DIAGNOSIS — E03.9 ACQUIRED HYPOTHYROIDISM: ICD-10-CM

## 2020-08-04 DIAGNOSIS — F31.4 BIPOLAR DISORDER, CURRENT EPISODE DEPRESSED, SEVERE, WITHOUT PSYCHOTIC FEATURES (HCC): ICD-10-CM

## 2020-08-04 DIAGNOSIS — M47.23 OSTEOARTHRITIS OF SPINE WITH RADICULOPATHY, CERVICOTHORACIC REGION: ICD-10-CM

## 2020-08-04 DIAGNOSIS — M79.642 PAIN IN BOTH HANDS: Primary | ICD-10-CM

## 2020-08-04 PROCEDURE — 99214 OFFICE O/P EST MOD 30 MIN: CPT | Performed by: NURSE PRACTITIONER

## 2020-08-04 RX ORDER — HYDROCODONE BITARTRATE AND ACETAMINOPHEN 7.5; 325 MG/1; MG/1
1 TABLET ORAL EVERY 6 HOURS PRN
Qty: 90 TABLET | Refills: 0 | Status: SHIPPED | OUTPATIENT
Start: 2020-08-04 | End: 2020-09-09 | Stop reason: SDUPTHER

## 2020-08-04 NOTE — PROGRESS NOTES
Chief Complaint   Patient presents with   • Hand Pain     also swelling; pt requested vraylar       HPI     Neck Pain    The current episode started more than 1 month ago. The problem occurs constantly. The problem has been waxing and waning. The pain is present in the posterior midline. The quality of the pain is described as shooting/sharp. The pain is at a severity of 8/10. The symptoms are aggravated by stress. The pain is same all the time. Associated symptoms include numbness ( LUE) and tingling. Pertinent negatives include no chest pain, fever, headaches, leg pain, pain with swallowing, paresis, photophobia, syncope, trouble swallowing, visual change, weakness or weight loss. She has tried ice, heat, muscle relaxants and oral narcotics for the symptoms. The treatment provided mild relief. She has seen neurosurg via video visit and had repeat neck c-spine xrays with follow up again on 8/24.     MRI cervical spine without contrast 6/4/2020   Findings: There is disc desiccation and disc space narrowing throughout the entire cervical spine.  There are reactive degenerative endplate marrow changes with edema at the C5/6 level.  There is normal signal within other cervical vertebral bodies.  Normal signal within the substance of the spinal cord.  Craniovertebral junction is within normal limits.     Impression: Multilevel degenerative disc disease and degenerative facet changes throughout the cervical spine.  Moderate canal stenosis at the C4-5 and C5-6 levels.  There is also moderate to severe multilevel neural foraminal narrowing see full report for further details    Hand/finger pain, Patient c/o pain also in the left >Right first finger knuckle now and she is left handed denies repetitive motions, she is taking gabapentin 600 mg 2 times daily, Robaxin as needed, diclofenac once daily and norco TID which is effective at keeping her pain tolerable. She was discharged from [facility] in the past.      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  Patient denies significant weight loss/gain, psychomotor retardation, thoughts of death or suicide, She does report fatigue (loss of energy), feelings of worthlessness (guilt), impaired concentration (indecisiveness), insomnia, hypersomnia, psychomotor agitation,       The following portions of the patient's history were reviewed and updated as appropriate: allergies, current medications, past family history, past medical history, past social history, past surgical history and problem list.    Past Medical History:   Diagnosis Date   • Anxiety    • Back pain    • DDD (degenerative disc disease), cervical    • Depression    • IBS (irritable bowel syndrome)    • Neck pain    • Osteoarthritis    • Osteoporosis      Past Surgical History:   Procedure Laterality Date   • BREAST LUMPECTOMY Left     x3   • KNEE SURGERY     • TONSILLECTOMY       Family History   Problem Relation Age of Onset   • Arthritis Mother    • Heart disease Mother    • Hypertension Mother    • Pancreatic cancer Other    • Diabetes Other    • Heart disease Other    • Hypertension Other    • Hyperlipidemia Other      Social History     Tobacco Use   • Smoking status: Current Every Day Smoker   • Smokeless tobacco: Never Used   Substance Use Topics   • Alcohol use: Yes         Current Outpatient Medications:   •  atorvastatin (LIPITOR) 20 MG tablet, Take 1 tablet by mouth every night at bedtime., Disp: 90 tablet, Rfl: 1  •  Calcium 600-200 MG-UNIT per tablet, Take 2 tablets by mouth Daily., Disp: 60 tablet, Rfl: 11  •  diclofenac (VOLTAREN) 75 MG EC tablet, Take 1 tablet by mouth  "2 (Two) Times a Day., Disp: 60 tablet, Rfl: 2  •  gabapentin (NEURONTIN) 600 MG tablet, Take 1 tablet by mouth 3 (Three) Times a Day., Disp: 90 tablet, Rfl: 1  •  levothyroxine (SYNTHROID, LEVOTHROID) 75 MCG tablet, Take 1 tablet by mouth Daily., Disp: 30 tablet, Rfl: 1  •  methocarbamol (ROBAXIN) 750 MG tablet, TAKE 1 TABLET BY MOUTH 3 (THREE) TIMES A DAY AS NEEDED FOR MUSCLE SPASMS., Disp: 60 tablet, Rfl: 0  •  nystatin (MYCOSTATIN) 391815 UNIT/ML suspension, Swish and swallow 5 mL 4 (Four) Times a Day. Take for 48 hours after symptoms resolve, then may stop, Disp: 473 mL, Rfl: 0  •  traZODone (DESYREL) 150 MG tablet, Take 1 tablet by mouth At Night As Needed for Sleep., Disp: 90 tablet, Rfl: 1  •  Cariprazine HCl (Vraylar) 1.5 MG capsule capsule, Take 1 capsule by mouth Daily., Disp: 30 capsule, Rfl: 5  •  diclofenac (VOLTAREN) 1 % gel gel, Apply 4 g topically to the appropriate area as directed 4 (Four) Times a Day As Needed (pain)., Disp: 150 g, Rfl: 0  •  HYDROcodone-acetaminophen (Norco) 7.5-325 MG per tablet, Take 1 tablet by mouth Every 6 (Six) Hours As Needed for Severe Pain ., Disp: 90 tablet, Rfl: 0      Review of Systems       A full 12 point review of systems has been obtained as mentioned in HPI, otherwise negative      Vitals:    08/04/20 1148   BP: 154/93   BP Location: Right arm   Patient Position: Sitting   Cuff Size: Small Adult   Pulse: 96   Temp: 98 °F (36.7 °C)   TempSrc: Skin   SpO2: 97%   Weight: 43.6 kg (96 lb 3.2 oz)   Height: 165.1 cm (65\")     Body mass index is 16.01 kg/m².    Physical Exam   Constitutional: She is oriented to person, place, and time. She appears well-developed and well-nourished. No distress.   Eyes: Pupils are equal, round, and reactive to light.   Neck: Normal range of motion. Neck supple. No JVD present. No thyromegaly present.   Cardiovascular: Normal rate, regular rhythm, normal heart sounds and intact distal pulses.   No murmur heard.  Pulmonary/Chest: Effort " normal and breath sounds normal. No respiratory distress.   Abdominal: Soft. Bowel sounds are normal. She exhibits no distension. There is no tenderness.   Musculoskeletal: Normal range of motion. She exhibits tenderness ( Cervical spine severe TTP and moderate limited range of motion.  Left MCP joint enlarged and tender, mild limited range of motion).   Neurological: She is alert and oriented to person, place, and time. No sensory deficit.   Skin: Skin is warm and dry. She is not diaphoretic.   Psychiatric: Her mood appears anxious. Her affect is labile. Her speech is rapid and/or pressured. She is agitated and hyperactive. Thought content is paranoid. Cognition and memory are normal. She expresses impulsivity. She exhibits a depressed mood.   Nursing note and vitals reviewed.        No visits with results within 7 Day(s) from this visit.   Latest known visit with results is:   Office Visit on 07/01/2020   Component Date Value Ref Range Status   • TSH 07/01/2020 22.600* 0.270 - 4.200 uIU/mL Final       Diagnoses and all orders for this visit:    1. Pain in both hands (Primary)  -     Rheumatoid Factor; Future  -     Uric Acid; Future  -     diclofenac (VOLTAREN) 1 % gel gel; Apply 4 g topically to the appropriate area as directed 4 (Four) Times a Day As Needed (pain).  Dispense: 150 g; Refill: 0    2. Acquired hypothyroidism  -     Comprehensive Metabolic Panel; Future  -     CBC (No Diff); Future  -     TSH; Future    3. Mixed hyperlipidemia  -     Lipid Panel; Future    4. Osteoarthritis of spine with radiculopathy, cervicothoracic region  -     HYDROcodone-acetaminophen (Norco) 7.5-325 MG per tablet; Take 1 tablet by mouth Every 6 (Six) Hours As Needed for Severe Pain .  Dispense: 90 tablet; Refill: 0  -     diclofenac (VOLTAREN) 1 % gel gel; Apply 4 g topically to the appropriate area as directed 4 (Four) Times a Day As Needed (pain).  Dispense: 150 g; Refill: 0    5. Facet arthropathy, cervical  -      HYDROcodone-acetaminophen (Norco) 7.5-325 MG per tablet; Take 1 tablet by mouth Every 6 (Six) Hours As Needed for Severe Pain .  Dispense: 90 tablet; Refill: 0    6. Bipolar disorder, current episode depressed, severe, without psychotic features (CMS/HCC)  -     Cariprazine HCl (Vraylar) 1.5 MG capsule capsule; Take 1 capsule by mouth Daily.  Dispense: 30 capsule; Refill: 5       1.  Check rheum factor and uric acid with labs next week, trial diclofenac gel as needed  2.  Continue 75 mcg levothyroxine, check TSH with labs next week  3.  Continue statin, patient is tolerating well  4/5.  Keep follow-up with neurosurgery, next appointment 8/24  Patient would like epidural injections if available for her  Refill [place] will give #90 this fill, plan to decrease that to #60 next month  Continue gabapentin, Robaxin and diclofenac for now  6. Resume vraylar, will titrate as needed  7.  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

## 2020-08-13 ENCOUNTER — CLINICAL SUPPORT (OUTPATIENT)
Dept: FAMILY MEDICINE CLINIC | Facility: CLINIC | Age: 60
End: 2020-08-13

## 2020-08-13 DIAGNOSIS — E78.2 MIXED HYPERLIPIDEMIA: ICD-10-CM

## 2020-08-13 DIAGNOSIS — E03.9 ACQUIRED HYPOTHYROIDISM: ICD-10-CM

## 2020-08-13 DIAGNOSIS — M79.642 PAIN IN BOTH HANDS: ICD-10-CM

## 2020-08-13 DIAGNOSIS — M79.641 PAIN IN BOTH HANDS: ICD-10-CM

## 2020-08-13 PROCEDURE — 80061 LIPID PANEL: CPT | Performed by: NURSE PRACTITIONER

## 2020-08-13 PROCEDURE — 84550 ASSAY OF BLOOD/URIC ACID: CPT | Performed by: NURSE PRACTITIONER

## 2020-08-13 PROCEDURE — 80053 COMPREHEN METABOLIC PANEL: CPT | Performed by: NURSE PRACTITIONER

## 2020-08-13 PROCEDURE — 84443 ASSAY THYROID STIM HORMONE: CPT | Performed by: NURSE PRACTITIONER

## 2020-08-13 PROCEDURE — 36415 COLL VENOUS BLD VENIPUNCTURE: CPT | Performed by: NURSE PRACTITIONER

## 2020-08-13 PROCEDURE — 86431 RHEUMATOID FACTOR QUANT: CPT | Performed by: NURSE PRACTITIONER

## 2020-08-13 PROCEDURE — 85027 COMPLETE CBC AUTOMATED: CPT | Performed by: NURSE PRACTITIONER

## 2020-08-14 LAB
ALBUMIN SERPL-MCNC: 4.3 G/DL (ref 3.5–5.2)
ALBUMIN/GLOB SERPL: 1.9 G/DL
ALP SERPL-CCNC: 45 U/L (ref 39–117)
ALT SERPL W P-5'-P-CCNC: 19 U/L (ref 1–33)
ANION GAP SERPL CALCULATED.3IONS-SCNC: 7.7 MMOL/L (ref 5–15)
AST SERPL-CCNC: 26 U/L (ref 1–32)
BILIRUB SERPL-MCNC: 0.3 MG/DL (ref 0–1.2)
BUN SERPL-MCNC: 24 MG/DL (ref 8–23)
BUN/CREAT SERPL: 34.8 (ref 7–25)
CALCIUM SPEC-SCNC: 9.3 MG/DL (ref 8.6–10.5)
CHLORIDE SERPL-SCNC: 102 MMOL/L (ref 98–107)
CHOLEST SERPL-MCNC: 181 MG/DL (ref 0–200)
CHROMATIN AB SERPL-ACNC: 30.5 IU/ML (ref 0–14)
CO2 SERPL-SCNC: 26.3 MMOL/L (ref 22–29)
CREAT SERPL-MCNC: 0.69 MG/DL (ref 0.57–1)
DEPRECATED RDW RBC AUTO: 42.1 FL (ref 37–54)
ERYTHROCYTE [DISTWIDTH] IN BLOOD BY AUTOMATED COUNT: 12.3 % (ref 12.3–15.4)
GFR SERPL CREATININE-BSD FRML MDRD: 87 ML/MIN/1.73
GLOBULIN UR ELPH-MCNC: 2.3 GM/DL
GLUCOSE SERPL-MCNC: 95 MG/DL (ref 65–99)
HCT VFR BLD AUTO: 40.2 % (ref 34–46.6)
HDLC SERPL-MCNC: 84 MG/DL (ref 40–60)
HGB BLD-MCNC: 13.6 G/DL (ref 12–15.9)
LDLC SERPL CALC-MCNC: 68 MG/DL (ref 0–100)
LDLC/HDLC SERPL: 0.81 {RATIO}
MCH RBC QN AUTO: 31.9 PG (ref 26.6–33)
MCHC RBC AUTO-ENTMCNC: 33.8 G/DL (ref 31.5–35.7)
MCV RBC AUTO: 94.4 FL (ref 79–97)
PLATELET # BLD AUTO: 142 10*3/MM3 (ref 140–450)
PMV BLD AUTO: 11.3 FL (ref 6–12)
POTASSIUM SERPL-SCNC: 3.7 MMOL/L (ref 3.5–5.2)
PROT SERPL-MCNC: 6.6 G/DL (ref 6–8.5)
RBC # BLD AUTO: 4.26 10*6/MM3 (ref 3.77–5.28)
SODIUM SERPL-SCNC: 136 MMOL/L (ref 136–145)
TRIGL SERPL-MCNC: 146 MG/DL (ref 0–150)
TSH SERPL DL<=0.05 MIU/L-ACNC: 7.73 UIU/ML (ref 0.27–4.2)
URATE SERPL-MCNC: 4.9 MG/DL (ref 2.4–5.7)
VLDLC SERPL-MCNC: 29.2 MG/DL (ref 5–40)
WBC # BLD AUTO: 10.95 10*3/MM3 (ref 3.4–10.8)

## 2020-08-17 ENCOUNTER — OFFICE VISIT (OUTPATIENT)
Dept: FAMILY MEDICINE CLINIC | Facility: CLINIC | Age: 60
End: 2020-08-17

## 2020-08-17 VITALS
TEMPERATURE: 98 F | HEIGHT: 65 IN | WEIGHT: 100.4 LBS | SYSTOLIC BLOOD PRESSURE: 129 MMHG | BODY MASS INDEX: 16.73 KG/M2 | OXYGEN SATURATION: 98 % | DIASTOLIC BLOOD PRESSURE: 70 MMHG | HEART RATE: 81 BPM

## 2020-08-17 DIAGNOSIS — R76.8 RHEUMATOID FACTOR POSITIVE: Primary | ICD-10-CM

## 2020-08-17 DIAGNOSIS — M47.812 FACET ARTHROPATHY, CERVICAL: ICD-10-CM

## 2020-08-17 DIAGNOSIS — M79.641 PAIN IN BOTH HANDS: ICD-10-CM

## 2020-08-17 DIAGNOSIS — E03.9 ACQUIRED HYPOTHYROIDISM: ICD-10-CM

## 2020-08-17 DIAGNOSIS — G56.92 NEUROPATHY, ARM, LEFT: ICD-10-CM

## 2020-08-17 DIAGNOSIS — M79.642 PAIN IN BOTH HANDS: ICD-10-CM

## 2020-08-17 DIAGNOSIS — F31.4 BIPOLAR DISORDER, CURRENT EPISODE DEPRESSED, SEVERE, WITHOUT PSYCHOTIC FEATURES (HCC): ICD-10-CM

## 2020-08-17 DIAGNOSIS — R25.2 MUSCLE CRAMPING: ICD-10-CM

## 2020-08-17 PROCEDURE — 99214 OFFICE O/P EST MOD 30 MIN: CPT | Performed by: NURSE PRACTITIONER

## 2020-08-17 RX ORDER — LEVOTHYROXINE SODIUM 0.1 MG/1
100 TABLET ORAL DAILY
Qty: 30 TABLET | Refills: 2 | Status: SHIPPED | OUTPATIENT
Start: 2020-08-17 | End: 2020-11-06

## 2020-08-17 NOTE — PROGRESS NOTES
Chief Complaint   Patient presents with   • Muscle Pain     body cramping.   • Skin Problem     says skin is peeling   • Results       HPI     Hypothyroidism, stable on medication, denies symptoms of constipation, weight gain/loss, hot or cold intolerance, hair loss, abnormal heart rate and fatigue, she does report peeling of the skin of her fingertips and her toes.     Cervical and lumbar pain, wrist pain, multi joint pain, wrist pain now stable with diclofenac topical and oral, cervical and lumbar back pain improved with current regimen, Patient reports weakness, numbness, tingling, of BUE, denies stiffness, loss of motion, bowel changes, bladder changes, pain with cough, pain with sneeze, pain with straining, pain with defecation, fever, chills, rash and groin pain.  Would like to review labs    Anxiety/bipolar depression, symptoms improving on Vraylar, patient denies significant weight loss/gain, insomnia, hypersomnia, psychomotor agitation, psychomotor retardation, fatigue (loss of energy), feelings of worthlessness (guilt), impaired concentration (indecisiveness), thoughts of death or suicide.           The following portions of the patient's history were reviewed and updated as appropriate: allergies, current medications, past family history, past medical history, past social history, past surgical history and problem list.    Past Medical History:   Diagnosis Date   • Anxiety    • Back pain    • DDD (degenerative disc disease), cervical    • Depression    • IBS (irritable bowel syndrome)    • Neck pain    • Osteoarthritis    • Osteoporosis      Past Surgical History:   Procedure Laterality Date   • BREAST LUMPECTOMY Left     x3   • KNEE SURGERY     • TONSILLECTOMY       Family History   Problem Relation Age of Onset   • Arthritis Mother    • Heart disease Mother    • Hypertension Mother    • Pancreatic cancer Other    • Diabetes Other    • Heart disease Other    • Hypertension Other    • Hyperlipidemia Other  "     Social History     Tobacco Use   • Smoking status: Current Every Day Smoker   • Smokeless tobacco: Never Used   Substance Use Topics   • Alcohol use: Yes         Current Outpatient Medications:   •  atorvastatin (LIPITOR) 20 MG tablet, Take 1 tablet by mouth every night at bedtime., Disp: 90 tablet, Rfl: 1  •  Calcium 600-200 MG-UNIT per tablet, Take 2 tablets by mouth Daily., Disp: 60 tablet, Rfl: 11  •  Cariprazine HCl (Vraylar) 1.5 MG capsule capsule, Take 1 capsule by mouth Daily., Disp: 30 capsule, Rfl: 5  •  diclofenac (VOLTAREN) 1 % gel gel, Apply 4 g topically to the appropriate area as directed 4 (Four) Times a Day As Needed (pain)., Disp: 150 g, Rfl: 0  •  diclofenac (VOLTAREN) 75 MG EC tablet, Take 1 tablet by mouth 2 (Two) Times a Day., Disp: 60 tablet, Rfl: 2  •  gabapentin (NEURONTIN) 600 MG tablet, Take 1 tablet by mouth 3 (Three) Times a Day., Disp: 90 tablet, Rfl: 1  •  HYDROcodone-acetaminophen (Norco) 7.5-325 MG per tablet, Take 1 tablet by mouth Every 6 (Six) Hours As Needed for Severe Pain ., Disp: 90 tablet, Rfl: 0  •  levothyroxine (SYNTHROID, LEVOTHROID) 100 MCG tablet, Take 1 tablet by mouth Daily., Disp: 30 tablet, Rfl: 2  •  methocarbamol (ROBAXIN) 750 MG tablet, TAKE 1 TABLET BY MOUTH 3 (THREE) TIMES A DAY AS NEEDED FOR MUSCLE SPASMS., Disp: 60 tablet, Rfl: 0  •  nystatin (MYCOSTATIN) 442603 UNIT/ML suspension, Swish and swallow 5 mL 4 (Four) Times a Day. Take for 48 hours after symptoms resolve, then may stop, Disp: 473 mL, Rfl: 0  •  traZODone (DESYREL) 150 MG tablet, Take 1 tablet by mouth At Night As Needed for Sleep., Disp: 90 tablet, Rfl: 1      Review of Systems     Obtained as mentioned in HPI, otherwise negative.       Vitals:    08/17/20 1024   BP: 129/70   BP Location: Left arm   Patient Position: Sitting   Cuff Size: Small Adult   Pulse: 81   Temp: 98 °F (36.7 °C)   TempSrc: Skin   SpO2: 98%   Weight: 45.5 kg (100 lb 6.4 oz)   Height: 165.1 cm (65\")     Body mass index is " 16.71 kg/m².    Physical Exam   Constitutional: She is oriented to person, place, and time. She appears well-developed and well-nourished. No distress.   Eyes: Pupils are equal, round, and reactive to light.   Neck: Normal range of motion. Neck supple. No JVD present. No thyromegaly present.   Cardiovascular: Normal rate, regular rhythm, normal heart sounds and intact distal pulses.   No murmur heard.  Pulmonary/Chest: Effort normal and breath sounds normal. No respiratory distress.   Abdominal: Soft. Bowel sounds are normal. She exhibits no distension. There is no tenderness.   Musculoskeletal: Normal range of motion. She exhibits tenderness ( Cervical spine severe TTP and moderate limited range of motion.  Left MCP joint enlarged and tender, mild limited range of motion).   Neurological: She is alert and oriented to person, place, and time. No sensory deficit.   Skin: Skin is warm and dry. She is not diaphoretic.   Peeling of skin fingertips and all toes   Psychiatric: Her mood appears anxious. Her affect is not labile. Her speech is not rapid and/or pressured. She is not agitated and not hyperactive. Thought content is not paranoid. Cognition and memory are normal. She does not express impulsivity. She exhibits a depressed mood.   Nursing note and vitals reviewed.      Clinical Support on 08/13/2020   Component Date Value Ref Range Status   • Rheumatoid Factor Quantitative 08/13/2020 30.5* 0.0 - 14.0 IU/mL Final   • Uric Acid 08/13/2020 4.9  2.4 - 5.7 mg/dL Final   • Total Cholesterol 08/13/2020 181  0 - 200 mg/dL Final   • Triglycerides 08/13/2020 146  0 - 150 mg/dL Final   • HDL Cholesterol 08/13/2020 84* 40 - 60 mg/dL Final   • LDL Cholesterol  08/13/2020 68  0 - 100 mg/dL Final   • VLDL Cholesterol 08/13/2020 29.2  5 - 40 mg/dL Final   • LDL/HDL Ratio 08/13/2020 0.81   Final   • Glucose 08/13/2020 95  65 - 99 mg/dL Final   • BUN 08/13/2020 24* 8 - 23 mg/dL Final   • Creatinine 08/13/2020 0.69  0.57 - 1.00  mg/dL Final   • Sodium 08/13/2020 136  136 - 145 mmol/L Final   • Potassium 08/13/2020 3.7  3.5 - 5.2 mmol/L Final   • Chloride 08/13/2020 102  98 - 107 mmol/L Final   • CO2 08/13/2020 26.3  22.0 - 29.0 mmol/L Final   • Calcium 08/13/2020 9.3  8.6 - 10.5 mg/dL Final   • Total Protein 08/13/2020 6.6  6.0 - 8.5 g/dL Final   • Albumin 08/13/2020 4.30  3.50 - 5.20 g/dL Final   • ALT (SGPT) 08/13/2020 19  1 - 33 U/L Final   • AST (SGOT) 08/13/2020 26  1 - 32 U/L Final   • Alkaline Phosphatase 08/13/2020 45  39 - 117 U/L Final   • Total Bilirubin 08/13/2020 0.3  0.0 - 1.2 mg/dL Final   • eGFR Non African Amer 08/13/2020 87  >60 mL/min/1.73 Final   • Globulin 08/13/2020 2.3  gm/dL Final   • A/G Ratio 08/13/2020 1.9  g/dL Final   • BUN/Creatinine Ratio 08/13/2020 34.8* 7.0 - 25.0 Final   • Anion Gap 08/13/2020 7.7  5.0 - 15.0 mmol/L Final   • WBC 08/13/2020 10.95* 3.40 - 10.80 10*3/mm3 Final   • RBC 08/13/2020 4.26  3.77 - 5.28 10*6/mm3 Final   • Hemoglobin 08/13/2020 13.6  12.0 - 15.9 g/dL Final   • Hematocrit 08/13/2020 40.2  34.0 - 46.6 % Final   • MCV 08/13/2020 94.4  79.0 - 97.0 fL Final   • MCH 08/13/2020 31.9  26.6 - 33.0 pg Final   • MCHC 08/13/2020 33.8  31.5 - 35.7 g/dL Final   • RDW 08/13/2020 12.3  12.3 - 15.4 % Final   • RDW-SD 08/13/2020 42.1  37.0 - 54.0 fl Final   • MPV 08/13/2020 11.3  6.0 - 12.0 fL Final   • Platelets 08/13/2020 142  140 - 450 10*3/mm3 Final   • TSH 08/13/2020 7.730* 0.270 - 4.200 uIU/mL Final           Diagnoses and all orders for this visit:    1. Rheumatoid factor positive (Primary)  -     Cancel: Ambulatory Referral to Rheumatology  -     Ambulatory Referral to Rheumatology    2. Facet arthropathy, cervical  -     Ambulatory Referral to Neurology    3. Neuropathy, arm, left  -     Ambulatory Referral to Neurology    4. Pain in both hands  -     Ambulatory Referral to Neurology    5. Acquired hypothyroidism    6. Muscle cramping    7. Bipolar disorder, current episode depressed, severe,  without psychotic features (CMS/HCC)    Other orders  -     levothyroxine (SYNTHROID, LEVOTHROID) 100 MCG tablet; Take 1 tablet by mouth Daily.  Dispense: 30 tablet; Refill: 2      -Referral to rheumatology for eval and treat, pt to also call for rheum that will accept her insurance for rheumatoid factor: 30.5  -Referral to Dr. Kaufman, keep follow-ups with Dr. Orozco neurosurgery although patient has not been able to see Dr. Orozco yet.  She has only had video visits  -We will continue current medications and pain regimen, pain is much more stable.  However if needs hydrocodone refills will need to see patient monthly, this will not be a long-term measure, if no assistance with neuro, neurosurgery and/or rheumatology patient will need to see pain management for long-term pain medication if needed, she is interested in injections if that is an option for her cervical spine pain.   -Increase levothyroxine to 100 mcg, TSH improving, down to 7 from 20  -Otherwise stable, increase water intake and protein, recommend stretching for muscle cramping  -continue vraylar, s/s improving, titrate as needed  -We will plan to repeat labs prior to November appointment  -Does not need any medication refills at this time  -Return office in 1 month or earlier if needed

## 2020-08-20 DIAGNOSIS — M47.23 OSTEOARTHRITIS OF SPINE WITH RADICULOPATHY, CERVICOTHORACIC REGION: ICD-10-CM

## 2020-08-21 RX ORDER — METHOCARBAMOL 750 MG/1
TABLET, FILM COATED ORAL
Qty: 60 TABLET | Refills: 0 | Status: SHIPPED | OUTPATIENT
Start: 2020-08-21 | End: 2020-09-08

## 2020-08-24 ENCOUNTER — TELEPHONE (OUTPATIENT)
Dept: NEUROSURGERY | Facility: CLINIC | Age: 60
End: 2020-08-24

## 2020-08-27 ENCOUNTER — OFFICE VISIT (OUTPATIENT)
Dept: FAMILY MEDICINE CLINIC | Facility: CLINIC | Age: 60
End: 2020-08-27

## 2020-08-27 VITALS
HEIGHT: 65 IN | OXYGEN SATURATION: 96 % | DIASTOLIC BLOOD PRESSURE: 82 MMHG | HEART RATE: 81 BPM | BODY MASS INDEX: 17.06 KG/M2 | SYSTOLIC BLOOD PRESSURE: 149 MMHG | WEIGHT: 102.4 LBS | TEMPERATURE: 98 F

## 2020-08-27 DIAGNOSIS — R76.8 RHEUMATOID FACTOR POSITIVE: ICD-10-CM

## 2020-08-27 DIAGNOSIS — M70.52 SUPRAPATELLAR BURSITIS OF LEFT KNEE: Primary | ICD-10-CM

## 2020-08-27 PROCEDURE — 99213 OFFICE O/P EST LOW 20 MIN: CPT | Performed by: NURSE PRACTITIONER

## 2020-08-27 NOTE — PROGRESS NOTES
"Chief Complaint   Patient presents with   • Joint Swelling     knees       HPI     Patient presents today after kneeling on the knees to clean something and stood up developing pain and a \"twinge\" in the left knee, today the upper portion of the knee is slightly swollen, tender, has pain with ambulation, she had a previous knee surgery and wanted evaluated.         The following portions of the patient's history were reviewed and updated as appropriate: allergies, current medications, past family history, past medical history, past social history, past surgical history and problem list.    Past Medical History:   Diagnosis Date   • Anxiety    • Back pain    • DDD (degenerative disc disease), cervical    • Depression    • IBS (irritable bowel syndrome)    • Neck pain    • Osteoarthritis    • Osteoporosis      Past Surgical History:   Procedure Laterality Date   • BREAST LUMPECTOMY Left     x3   • KNEE SURGERY     • TONSILLECTOMY       Family History   Problem Relation Age of Onset   • Arthritis Mother    • Heart disease Mother    • Hypertension Mother    • Pancreatic cancer Other    • Diabetes Other    • Heart disease Other    • Hypertension Other    • Hyperlipidemia Other      Social History     Tobacco Use   • Smoking status: Current Every Day Smoker   • Smokeless tobacco: Never Used   Substance Use Topics   • Alcohol use: Yes         Current Outpatient Medications:   •  atorvastatin (LIPITOR) 20 MG tablet, Take 1 tablet by mouth every night at bedtime., Disp: 90 tablet, Rfl: 1  •  Calcium 600-200 MG-UNIT per tablet, Take 2 tablets by mouth Daily., Disp: 60 tablet, Rfl: 11  •  Cariprazine HCl (Vraylar) 1.5 MG capsule capsule, Take 1 capsule by mouth Daily., Disp: 30 capsule, Rfl: 5  •  diclofenac (VOLTAREN) 1 % gel gel, Apply 4 g topically to the appropriate area as directed 4 (Four) Times a Day As Needed (pain)., Disp: 150 g, Rfl: 0  •  diclofenac (VOLTAREN) 75 MG EC tablet, Take 1 tablet by mouth 2 (Two) Times a " "Day., Disp: 60 tablet, Rfl: 2  •  gabapentin (NEURONTIN) 600 MG tablet, Take 1 tablet by mouth 3 (Three) Times a Day., Disp: 90 tablet, Rfl: 1  •  HYDROcodone-acetaminophen (Norco) 7.5-325 MG per tablet, Take 1 tablet by mouth Every 6 (Six) Hours As Needed for Severe Pain ., Disp: 90 tablet, Rfl: 0  •  levothyroxine (SYNTHROID, LEVOTHROID) 100 MCG tablet, Take 1 tablet by mouth Daily., Disp: 30 tablet, Rfl: 2  •  methocarbamol (ROBAXIN) 750 MG tablet, TAKE 1 TABLET BY MOUTH 3 TIMES A DAY AS NEEDED FOR MUSCLE SPASMS, Disp: 60 tablet, Rfl: 0  •  nystatin (MYCOSTATIN) 642336 UNIT/ML suspension, Swish and swallow 5 mL 4 (Four) Times a Day. Take for 48 hours after symptoms resolve, then may stop, Disp: 473 mL, Rfl: 0  •  traZODone (DESYREL) 150 MG tablet, Take 1 tablet by mouth At Night As Needed for Sleep., Disp: 90 tablet, Rfl: 1      Review of Systems     Obtained as mentioned in HPI, otherwise negative.       Vitals:    08/27/20 1524   BP: 149/82   BP Location: Left arm   Patient Position: Sitting   Cuff Size: Small Adult   Pulse: 81   Temp: 98 °F (36.7 °C)   TempSrc: Skin   SpO2: 96%   Weight: 46.4 kg (102 lb 6.4 oz)   Height: 165.1 cm (65\")     Body mass index is 17.04 kg/m².    Physical Exam   Constitutional: She is oriented to person, place, and time. She appears well-developed and well-nourished. No distress.   Eyes: Pupils are equal, round, and reactive to light.   Neck: Normal range of motion. Neck supple. No JVD present. No thyromegaly present.   Cardiovascular: Normal rate, regular rhythm, normal heart sounds and intact distal pulses.   No murmur heard.  Pulmonary/Chest: Effort normal and breath sounds normal. No respiratory distress.   Abdominal: Soft. Bowel sounds are normal. She exhibits no distension. There is no tenderness.   Musculoskeletal: Normal range of motion. She exhibits tenderness (proximal L knee/patella and poster knee ttp, slight edema, good rom).   Neurological: She is alert and oriented to " person, place, and time. No sensory deficit.   Skin: Skin is warm and dry. She is not diaphoretic.   Psychiatric: She has a normal mood and affect. Her behavior is normal. Judgment and thought content normal.   Nursing note and vitals reviewed.      No visits with results within 7 Day(s) from this visit.   Latest known visit with results is:   Clinical Support on 08/13/2020   Component Date Value Ref Range Status   • Rheumatoid Factor Quantitative 08/13/2020 30.5* 0.0 - 14.0 IU/mL Final   • Uric Acid 08/13/2020 4.9  2.4 - 5.7 mg/dL Final   • Total Cholesterol 08/13/2020 181  0 - 200 mg/dL Final   • Triglycerides 08/13/2020 146  0 - 150 mg/dL Final   • HDL Cholesterol 08/13/2020 84* 40 - 60 mg/dL Final   • LDL Cholesterol  08/13/2020 68  0 - 100 mg/dL Final   • VLDL Cholesterol 08/13/2020 29.2  5 - 40 mg/dL Final   • LDL/HDL Ratio 08/13/2020 0.81   Final   • Glucose 08/13/2020 95  65 - 99 mg/dL Final   • BUN 08/13/2020 24* 8 - 23 mg/dL Final   • Creatinine 08/13/2020 0.69  0.57 - 1.00 mg/dL Final   • Sodium 08/13/2020 136  136 - 145 mmol/L Final   • Potassium 08/13/2020 3.7  3.5 - 5.2 mmol/L Final   • Chloride 08/13/2020 102  98 - 107 mmol/L Final   • CO2 08/13/2020 26.3  22.0 - 29.0 mmol/L Final   • Calcium 08/13/2020 9.3  8.6 - 10.5 mg/dL Final   • Total Protein 08/13/2020 6.6  6.0 - 8.5 g/dL Final   • Albumin 08/13/2020 4.30  3.50 - 5.20 g/dL Final   • ALT (SGPT) 08/13/2020 19  1 - 33 U/L Final   • AST (SGOT) 08/13/2020 26  1 - 32 U/L Final   • Alkaline Phosphatase 08/13/2020 45  39 - 117 U/L Final   • Total Bilirubin 08/13/2020 0.3  0.0 - 1.2 mg/dL Final   • eGFR Non African Amer 08/13/2020 87  >60 mL/min/1.73 Final   • Globulin 08/13/2020 2.3  gm/dL Final   • A/G Ratio 08/13/2020 1.9  g/dL Final   • BUN/Creatinine Ratio 08/13/2020 34.8* 7.0 - 25.0 Final   • Anion Gap 08/13/2020 7.7  5.0 - 15.0 mmol/L Final   • WBC 08/13/2020 10.95* 3.40 - 10.80 10*3/mm3 Final   • RBC 08/13/2020 4.26  3.77 - 5.28 10*6/mm3 Final    • Hemoglobin 08/13/2020 13.6  12.0 - 15.9 g/dL Final   • Hematocrit 08/13/2020 40.2  34.0 - 46.6 % Final   • MCV 08/13/2020 94.4  79.0 - 97.0 fL Final   • MCH 08/13/2020 31.9  26.6 - 33.0 pg Final   • MCHC 08/13/2020 33.8  31.5 - 35.7 g/dL Final   • RDW 08/13/2020 12.3  12.3 - 15.4 % Final   • RDW-SD 08/13/2020 42.1  37.0 - 54.0 fl Final   • MPV 08/13/2020 11.3  6.0 - 12.0 fL Final   • Platelets 08/13/2020 142  140 - 450 10*3/mm3 Final   • TSH 08/13/2020 7.730* 0.270 - 4.200 uIU/mL Final       Diagnoses and all orders for this visit:    1. Suprapatellar bursitis of left knee (Primary)  Comments:  Treat symptomatically, recommend RICE, diclofenac gel, continue all other medications previously prescribed.  Notify if wonb in 1-2 weeks will re-eval    2. Rheumatoid factor positive  Comments:  Patient to check with her insurance on which rheumatologist is is covered on her insurance plan.

## 2020-08-28 ENCOUNTER — TELEPHONE (OUTPATIENT)
Dept: FAMILY MEDICINE CLINIC | Facility: CLINIC | Age: 60
End: 2020-08-28

## 2020-08-31 ENCOUNTER — TELEPHONE (OUTPATIENT)
Dept: FAMILY MEDICINE CLINIC | Facility: CLINIC | Age: 60
End: 2020-08-31

## 2020-08-31 DIAGNOSIS — M47.23 OSTEOARTHRITIS OF SPINE WITH RADICULOPATHY, CERVICOTHORACIC REGION: ICD-10-CM

## 2020-08-31 DIAGNOSIS — F31.4 BIPOLAR DISORDER, CURRENT EPISODE DEPRESSED, SEVERE, WITHOUT PSYCHOTIC FEATURES (HCC): ICD-10-CM

## 2020-08-31 RX ORDER — DICLOFENAC SODIUM 75 MG/1
TABLET, DELAYED RELEASE ORAL
Qty: 60 TABLET | Refills: 5 | Status: SHIPPED | OUTPATIENT
Start: 2020-08-31 | End: 2021-08-09

## 2020-08-31 RX ORDER — BUSPIRONE HYDROCHLORIDE 5 MG/1
5 TABLET ORAL 2 TIMES DAILY PRN
Qty: 60 TABLET | Refills: 0 | Status: SHIPPED | OUTPATIENT
Start: 2020-08-31 | End: 2020-09-24

## 2020-09-08 DIAGNOSIS — M47.23 OSTEOARTHRITIS OF SPINE WITH RADICULOPATHY, CERVICOTHORACIC REGION: ICD-10-CM

## 2020-09-08 RX ORDER — METHOCARBAMOL 750 MG/1
TABLET, FILM COATED ORAL
Qty: 60 TABLET | Refills: 0 | Status: SHIPPED | OUTPATIENT
Start: 2020-09-08 | End: 2020-10-17

## 2020-09-09 DIAGNOSIS — M47.812 FACET ARTHROPATHY, CERVICAL: ICD-10-CM

## 2020-09-09 DIAGNOSIS — M47.23 OSTEOARTHRITIS OF SPINE WITH RADICULOPATHY, CERVICOTHORACIC REGION: ICD-10-CM

## 2020-09-11 RX ORDER — HYDROCODONE BITARTRATE AND ACETAMINOPHEN 7.5; 325 MG/1; MG/1
1 TABLET ORAL EVERY 6 HOURS PRN
Qty: 90 TABLET | Refills: 0 | Status: SHIPPED | OUTPATIENT
Start: 2020-09-11 | End: 2020-10-08 | Stop reason: SDUPTHER

## 2020-09-18 RX ORDER — ACYCLOVIR 400 MG/1
TABLET ORAL
Qty: 50 TABLET | Refills: 0 | Status: SHIPPED | OUTPATIENT
Start: 2020-09-18 | End: 2020-09-28

## 2020-09-22 ENCOUNTER — APPOINTMENT (OUTPATIENT)
Dept: CT IMAGING | Facility: HOSPITAL | Age: 60
End: 2020-09-22

## 2020-09-22 ENCOUNTER — HOSPITAL ENCOUNTER (EMERGENCY)
Facility: HOSPITAL | Age: 60
Discharge: LEFT BEFORE DISCHARGE | End: 2020-09-22
Attending: EMERGENCY MEDICINE | Admitting: EMERGENCY MEDICINE

## 2020-09-22 VITALS
HEART RATE: 94 BPM | BODY MASS INDEX: 17.07 KG/M2 | RESPIRATION RATE: 16 BRPM | TEMPERATURE: 98.2 F | DIASTOLIC BLOOD PRESSURE: 81 MMHG | OXYGEN SATURATION: 96 % | WEIGHT: 100 LBS | SYSTOLIC BLOOD PRESSURE: 141 MMHG | HEIGHT: 64 IN
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
## 2020-09-22 DIAGNOSIS — S06.0X0A CONCUSSION WITHOUT LOSS OF CONSCIOUSNESS, INITIAL ENCOUNTER: Primary | ICD-10-CM

## 2020-09-22 PROCEDURE — 99283 EMERGENCY DEPT VISIT LOW MDM: CPT

## 2020-09-22 PROCEDURE — 70450 CT HEAD/BRAIN W/O DYE: CPT
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
## 2020-09-24 DIAGNOSIS — F31.4 BIPOLAR DISORDER, CURRENT EPISODE DEPRESSED, SEVERE, WITHOUT PSYCHOTIC FEATURES (HCC): ICD-10-CM

## 2020-09-24 RX ORDER — CARIPRAZINE 3 MG/1
CAPSULE, GELATIN COATED ORAL
Qty: 30 CAPSULE | Refills: 0 | Status: SHIPPED | OUTPATIENT
Start: 2020-09-24 | End: 2020-09-29

## 2020-09-24 RX ORDER — BUSPIRONE HYDROCHLORIDE 5 MG/1
5 TABLET ORAL 2 TIMES DAILY PRN
Qty: 60 TABLET | Refills: 0 | Status: SHIPPED | OUTPATIENT
Start: 2020-09-24 | End: 2020-09-29 | Stop reason: SDUPTHER

## 2020-09-25 RX ORDER — GABAPENTIN 600 MG/1
TABLET ORAL
Qty: 90 TABLET | Refills: 1 | Status: SHIPPED | OUTPATIENT
Start: 2020-09-25 | End: 2020-11-24 | Stop reason: SDUPTHER

## 2020-09-28 ENCOUNTER — TELEPHONE (OUTPATIENT)
Dept: FAMILY MEDICINE CLINIC | Facility: CLINIC | Age: 60
End: 2020-09-28

## 2020-09-28 RX ORDER — ACYCLOVIR 400 MG/1
TABLET ORAL
Qty: 50 TABLET | Refills: 0 | Status: SHIPPED | OUTPATIENT
Start: 2020-09-28 | End: 2021-06-07 | Stop reason: SDUPTHER

## 2020-09-29 RX ORDER — CARIPRAZINE 6 MG/1
1 CAPSULE, GELATIN COATED ORAL DAILY
Qty: 30 CAPSULE | Refills: 2 | Status: SHIPPED | OUTPATIENT
Start: 2020-09-29 | End: 2020-11-24 | Stop reason: SDUPTHER

## 2020-09-29 RX ORDER — BUSPIRONE HYDROCHLORIDE 10 MG/1
10 TABLET ORAL 2 TIMES DAILY PRN
Qty: 60 TABLET | Refills: 0 | Status: SHIPPED | OUTPATIENT
Start: 2020-09-29 | End: 2020-10-21

## 2020-09-30 ENCOUNTER — ON CAMPUS - OUTPATIENT (AMBULATORY)
Dept: URBAN - METROPOLITAN AREA HOSPITAL 2 | Facility: HOSPITAL | Age: 60
End: 2020-09-30
Payer: COMMERCIAL

## 2020-09-30 VITALS
OXYGEN SATURATION: 99 % | RESPIRATION RATE: 16 BRPM | HEART RATE: 93 BPM | TEMPERATURE: 97.6 F | HEART RATE: 82 BPM | OXYGEN SATURATION: 100 % | DIASTOLIC BLOOD PRESSURE: 83 MMHG | SYSTOLIC BLOOD PRESSURE: 136 MMHG | HEIGHT: 65 IN | HEART RATE: 100 BPM | SYSTOLIC BLOOD PRESSURE: 132 MMHG | RESPIRATION RATE: 18 BRPM | DIASTOLIC BLOOD PRESSURE: 88 MMHG | OXYGEN SATURATION: 98 % | SYSTOLIC BLOOD PRESSURE: 110 MMHG | DIASTOLIC BLOOD PRESSURE: 86 MMHG | DIASTOLIC BLOOD PRESSURE: 74 MMHG | DIASTOLIC BLOOD PRESSURE: 96 MMHG | HEART RATE: 97 BPM | SYSTOLIC BLOOD PRESSURE: 137 MMHG | WEIGHT: 97.6 LBS | HEART RATE: 91 BPM | SYSTOLIC BLOOD PRESSURE: 133 MMHG | HEART RATE: 86 BPM | DIASTOLIC BLOOD PRESSURE: 94 MMHG | SYSTOLIC BLOOD PRESSURE: 148 MMHG

## 2020-09-30 DIAGNOSIS — R19.5 OTHER FECAL ABNORMALITIES: ICD-10-CM

## 2020-09-30 DIAGNOSIS — K64.1 SECOND DEGREE HEMORRHOIDS: ICD-10-CM

## 2020-09-30 PROCEDURE — 45378 DIAGNOSTIC COLONOSCOPY: CPT | Performed by: INTERNAL MEDICINE

## 2020-09-30 RX ORDER — METRONIDAZOLE 250 MG/1
1000 TABLET, FILM COATED ORAL
Qty: 40 | Refills: 0 | Status: ACTIVE
Start: 2020-09-30

## 2020-09-30 RX ORDER — FLUCONAZOLE 50 MG/1
50 TABLET ORAL
Qty: 7 | Refills: 1 | Status: ACTIVE
Start: 2020-09-30

## 2020-09-30 RX ORDER — DICYCLOMINE HYDROCHLORIDE 10 MG/1
40 CAPSULE ORAL
Qty: 60 | Refills: 11 | Status: ACTIVE
Start: 2020-09-30

## 2020-10-01 NOTE — TELEPHONE ENCOUNTER
Patient is aware of increase in medication doses.  States she had her colonoscopy yesterday so we should be getting those results soon.  cc

## 2020-10-08 DIAGNOSIS — M47.23 OSTEOARTHRITIS OF SPINE WITH RADICULOPATHY, CERVICOTHORACIC REGION: ICD-10-CM

## 2020-10-08 DIAGNOSIS — M47.812 FACET ARTHROPATHY, CERVICAL: ICD-10-CM

## 2020-10-09 RX ORDER — HYDROCODONE BITARTRATE AND ACETAMINOPHEN 7.5; 325 MG/1; MG/1
1 TABLET ORAL EVERY 6 HOURS PRN
Qty: 60 TABLET | Refills: 0 | Status: SHIPPED | OUTPATIENT
Start: 2020-10-09 | End: 2020-10-21

## 2020-10-13 DIAGNOSIS — G47.09 OTHER INSOMNIA: ICD-10-CM

## 2020-10-14 ENCOUNTER — TELEPHONE (OUTPATIENT)
Dept: FAMILY MEDICINE CLINIC | Facility: CLINIC | Age: 60
End: 2020-10-14

## 2020-10-14 DIAGNOSIS — M20.60 ACQUIRED DEFORMITY OF TOE, UNSPECIFIED LATERALITY: Primary | ICD-10-CM

## 2020-10-14 RX ORDER — TRAZODONE HYDROCHLORIDE 150 MG/1
150 TABLET ORAL NIGHTLY PRN
Qty: 90 TABLET | Refills: 1 | Status: SHIPPED | OUTPATIENT
Start: 2020-10-14 | End: 2020-10-21 | Stop reason: SDUPTHER

## 2020-10-14 NOTE — TELEPHONE ENCOUNTER
Patient called stating that she has had over-riding of her toes which has gotten progressively worse over the past year and is now to the point that it bothers her with walking.  Would like to be referred to a podiatrist.  Thanks.  cc

## 2020-10-15 DIAGNOSIS — M47.23 OSTEOARTHRITIS OF SPINE WITH RADICULOPATHY, CERVICOTHORACIC REGION: ICD-10-CM

## 2020-10-16 ENCOUNTER — TELEPHONE (OUTPATIENT)
Dept: FAMILY MEDICINE CLINIC | Facility: CLINIC | Age: 60
End: 2020-10-16

## 2020-10-16 NOTE — TELEPHONE ENCOUNTER
Patient informed and will wait for Dr. Pleitez office to contact her.  I suggested that if she did not hear from them by the beginning of next week she can give us a call.  Patient voiced understanding.  cc

## 2020-10-16 NOTE — TELEPHONE ENCOUNTER
Pt left vm stating she is having anxiety and panic attacks.  She said the buspar isn't helping and needs something stronger to calm her down.  She asked what she should do?

## 2020-10-17 RX ORDER — METHOCARBAMOL 750 MG/1
TABLET, FILM COATED ORAL
Qty: 60 TABLET | Refills: 2 | Status: SHIPPED | OUTPATIENT
Start: 2020-10-17 | End: 2020-12-15

## 2020-10-21 ENCOUNTER — OFFICE VISIT (OUTPATIENT)
Dept: FAMILY MEDICINE CLINIC | Facility: CLINIC | Age: 60
End: 2020-10-21

## 2020-10-21 VITALS
SYSTOLIC BLOOD PRESSURE: 170 MMHG | WEIGHT: 100.2 LBS | HEIGHT: 64 IN | DIASTOLIC BLOOD PRESSURE: 83 MMHG | TEMPERATURE: 97 F | BODY MASS INDEX: 17.11 KG/M2 | OXYGEN SATURATION: 97 % | HEART RATE: 109 BPM

## 2020-10-21 DIAGNOSIS — G47.09 OTHER INSOMNIA: ICD-10-CM

## 2020-10-21 DIAGNOSIS — M47.23 OSTEOARTHRITIS OF SPINE WITH RADICULOPATHY, CERVICOTHORACIC REGION: ICD-10-CM

## 2020-10-21 DIAGNOSIS — F31.4 BIPOLAR DISORDER, CURRENT EPISODE DEPRESSED, SEVERE, WITHOUT PSYCHOTIC FEATURES (HCC): ICD-10-CM

## 2020-10-21 DIAGNOSIS — E03.9 ACQUIRED HYPOTHYROIDISM: ICD-10-CM

## 2020-10-21 DIAGNOSIS — F41.9 ANXIETY: ICD-10-CM

## 2020-10-21 DIAGNOSIS — Z00.00 PREVENTATIVE HEALTH CARE: ICD-10-CM

## 2020-10-21 DIAGNOSIS — R06.83 SNORING: ICD-10-CM

## 2020-10-21 DIAGNOSIS — F45.8 BRUXISM (TEETH GRINDING): Primary | ICD-10-CM

## 2020-10-21 PROCEDURE — 90471 IMMUNIZATION ADMIN: CPT | Performed by: NURSE PRACTITIONER

## 2020-10-21 PROCEDURE — 84443 ASSAY THYROID STIM HORMONE: CPT | Performed by: NURSE PRACTITIONER

## 2020-10-21 PROCEDURE — 85027 COMPLETE CBC AUTOMATED: CPT | Performed by: NURSE PRACTITIONER

## 2020-10-21 PROCEDURE — 99214 OFFICE O/P EST MOD 30 MIN: CPT | Performed by: NURSE PRACTITIONER

## 2020-10-21 PROCEDURE — 90686 IIV4 VACC NO PRSV 0.5 ML IM: CPT | Performed by: NURSE PRACTITIONER

## 2020-10-21 RX ORDER — TRAZODONE HYDROCHLORIDE 300 MG/1
300 TABLET ORAL NIGHTLY PRN
Qty: 90 TABLET | Refills: 1 | Status: SHIPPED | OUTPATIENT
Start: 2020-10-21 | End: 2021-04-14

## 2020-10-21 RX ORDER — PREDNISOLONE ACETATE 10 MG/ML
SUSPENSION/ DROPS OPHTHALMIC
COMMUNITY
Start: 2020-10-20 | End: 2020-10-21

## 2020-10-21 RX ORDER — CLONAZEPAM 0.5 MG/1
0.5 TABLET ORAL 2 TIMES DAILY PRN
Qty: 60 TABLET | Refills: 2 | Status: SHIPPED | OUTPATIENT
Start: 2020-10-21 | End: 2021-01-25

## 2020-10-21 RX ORDER — DICYCLOMINE HYDROCHLORIDE 10 MG/1
CAPSULE ORAL
COMMUNITY
Start: 2020-10-15 | End: 2021-08-19

## 2020-10-21 NOTE — PROGRESS NOTES
Chief Complaint   Patient presents with   • Anxiety     pt reports that she is grinding teeth and asked for something stronger.   • Manic Behavior     pt would like to know if grinding can be caused by medication, bipolar or stress?   • Insomnia     pt reports that trazodone only helps when she takes two, but she is prescribed to only take one.   • Stress   • Pain     pt reports that norco was only refilled for 2 wk supply and wanted to discuss.       AMY Reyes, has appt with Dr. Pleitez soon    Diverticulitis, no polyps on colonoscopy, following with [facility], currently stable    c-spine pain, referred to Dr. Orozco who is now leaving the practice, then Dr. Kaufman and has not heard about appt but does not think she needs now, pain is improved now, she is out of norco, is only using gabapentin and Robaxin as needed.     Bipolar depression, somewhat improved on Vraylar and is no longer having manic symptoms, however with severe situational and long-term anxiety,/failed hydroxyzine and buspirone, patient was on Valium in the past and reports effect. She reports she is grinding her teeth, especially at night, and has broken several of her teeth and seeing a dentist, also reports insomnia, hypersomnolence, daytime fatigue and loss of energy, patient denies significant weight loss/gain, psychomotor agitation, psychomotor retardation, feelings of worthlessness (guilt), impaired concentration (indecisiveness), thoughts of death or suicide.      Insomnia, This is a chronic problem, started more than 1 year ago, pt stable on medication. Pt denies abdominal pain, arthralgias, chills, coughing, fever, joint swelling, myalgias, nausea, neck pain, rash, sore throat, vomiting or weakness, pt reports severe hypersomnolence/daytime fatigue, snoring, bruxism. Nothing aggravates the symptoms. Treatments tried: OTC sleep aids ineffective, trazodone effective for staying asleep and provides significant relief.              The following  portions of the patient's history were reviewed and updated as appropriate: allergies, current medications, past family history, past medical history, past social history, past surgical history and problem list.    Past Medical History:   Diagnosis Date   • Anxiety    • Back pain    • DDD (degenerative disc disease), cervical    • Depression    • IBS (irritable bowel syndrome)    • Neck pain    • Osteoarthritis    • Osteoporosis      Past Surgical History:   Procedure Laterality Date   • BREAST LUMPECTOMY Left     x3   • KNEE SURGERY     • TONSILLECTOMY       Family History   Problem Relation Age of Onset   • Arthritis Mother    • Heart disease Mother    • Hypertension Mother    • Pancreatic cancer Other    • Diabetes Other    • Heart disease Other    • Hypertension Other    • Hyperlipidemia Other      Social History     Tobacco Use   • Smoking status: Current Every Day Smoker   • Smokeless tobacco: Never Used   Substance Use Topics   • Alcohol use: Yes         Current Outpatient Medications:   •  acyclovir (ZOVIRAX) 400 MG tablet, TAKE 1 TABLET BY MOUTH 4 TO 5 TIMES DAILY, Disp: 50 tablet, Rfl: 0  •  atorvastatin (LIPITOR) 20 MG tablet, Take 1 tablet by mouth every night at bedtime., Disp: 90 tablet, Rfl: 1  •  Calcium 600-200 MG-UNIT per tablet, Take 2 tablets by mouth Daily., Disp: 60 tablet, Rfl: 11  •  Cariprazine HCl (Vraylar) 6 MG capsule, Take 1 capsule by mouth Daily., Disp: 30 capsule, Rfl: 2  •  diclofenac (VOLTAREN) 1 % gel gel, Apply 4 g topically to the appropriate area as directed 4 (Four) Times a Day As Needed (pain)., Disp: 150 g, Rfl: 0  •  diclofenac (VOLTAREN) 75 MG EC tablet, TAKE 1 TABLET BY MOUTH TWICE A DAY, Disp: 60 tablet, Rfl: 5  •  dicyclomine (BENTYL) 10 MG capsule, TAKE 1 CAPSULE BY MOUTH EVERY SIX HOURS AS NEEDED FOR PAIN FOR 30 DAYS, Disp: , Rfl:   •  gabapentin (NEURONTIN) 600 MG tablet, TAKE 1 TABLET BY MOUTH THREE TIMES A DAY, Disp: 90 tablet, Rfl: 1  •  levothyroxine (SYNTHROID,  "LEVOTHROID) 100 MCG tablet, Take 1 tablet by mouth Daily., Disp: 30 tablet, Rfl: 2  •  methocarbamol (ROBAXIN) 750 MG tablet, TAKE 1 TABLET BY MOUTH THREE TIMES A DAY AS NEEDED FOR MUSCLE SPASMS, Disp: 60 tablet, Rfl: 2  •  traZODone (DESYREL) 300 MG tablet, Take 1 tablet by mouth At Night As Needed for Sleep., Disp: 90 tablet, Rfl: 1  •  clonazePAM (KlonoPIN) 0.5 MG tablet, Take 1 tablet by mouth 2 (Two) Times a Day As Needed for Anxiety., Disp: 60 tablet, Rfl: 2      Review of Systems       Obtained as mentioned in HPI, otherwise negative.       Vitals:    10/21/20 1113   BP: 170/83   BP Location: Right arm   Patient Position: Sitting   Cuff Size: Pediatric   Pulse: 109   Temp: 97 °F (36.1 °C)   TempSrc: Skin   SpO2: 97%   Weight: 45.5 kg (100 lb 3.2 oz)   Height: 162.6 cm (64.02\")     Body mass index is 17.19 kg/m².    Physical Exam  Vitals signs and nursing note reviewed.   Constitutional:       General: She is not in acute distress.     Appearance: She is well-developed. She is not diaphoretic.   HENT:      Mouth/Throat:      Comments: Several broken teeth  Eyes:      Pupils: Pupils are equal, round, and reactive to light.   Neck:      Musculoskeletal: Normal range of motion and neck supple.      Thyroid: No thyromegaly.      Vascular: No JVD.   Cardiovascular:      Rate and Rhythm: Normal rate and regular rhythm.      Heart sounds: Normal heart sounds. No murmur.   Pulmonary:      Effort: Pulmonary effort is normal. No respiratory distress.      Breath sounds: Normal breath sounds.   Abdominal:      General: Bowel sounds are normal. There is no distension.      Palpations: Abdomen is soft.      Tenderness: There is no abdominal tenderness.   Musculoskeletal: Normal range of motion.         General: No tenderness.   Skin:     General: Skin is warm and dry.   Neurological:      Mental Status: She is alert and oriented to person, place, and time.      Sensory: No sensory deficit.   Psychiatric:         Attention " and Perception: Attention normal.         Mood and Affect: Mood is anxious and depressed. Affect is labile.         Speech: Speech normal.         Behavior: Behavior is hyperactive.         Thought Content: Thought content normal.         Cognition and Memory: Cognition and memory normal.         Judgment: Judgment is impulsive.         No visits with results within 7 Day(s) from this visit.   Latest known visit with results is:   Clinical Support on 08/13/2020   Component Date Value Ref Range Status   • Rheumatoid Factor Quantitative 08/13/2020 30.5* 0.0 - 14.0 IU/mL Final   • Uric Acid 08/13/2020 4.9  2.4 - 5.7 mg/dL Final   • Total Cholesterol 08/13/2020 181  0 - 200 mg/dL Final   • Triglycerides 08/13/2020 146  0 - 150 mg/dL Final   • HDL Cholesterol 08/13/2020 84* 40 - 60 mg/dL Final   • LDL Cholesterol  08/13/2020 68  0 - 100 mg/dL Final   • VLDL Cholesterol 08/13/2020 29.2  5 - 40 mg/dL Final   • LDL/HDL Ratio 08/13/2020 0.81   Final   • Glucose 08/13/2020 95  65 - 99 mg/dL Final   • BUN 08/13/2020 24* 8 - 23 mg/dL Final   • Creatinine 08/13/2020 0.69  0.57 - 1.00 mg/dL Final   • Sodium 08/13/2020 136  136 - 145 mmol/L Final   • Potassium 08/13/2020 3.7  3.5 - 5.2 mmol/L Final   • Chloride 08/13/2020 102  98 - 107 mmol/L Final   • CO2 08/13/2020 26.3  22.0 - 29.0 mmol/L Final   • Calcium 08/13/2020 9.3  8.6 - 10.5 mg/dL Final   • Total Protein 08/13/2020 6.6  6.0 - 8.5 g/dL Final   • Albumin 08/13/2020 4.30  3.50 - 5.20 g/dL Final   • ALT (SGPT) 08/13/2020 19  1 - 33 U/L Final   • AST (SGOT) 08/13/2020 26  1 - 32 U/L Final   • Alkaline Phosphatase 08/13/2020 45  39 - 117 U/L Final   • Total Bilirubin 08/13/2020 0.3  0.0 - 1.2 mg/dL Final   • eGFR Non African Amer 08/13/2020 87  >60 mL/min/1.73 Final   • Globulin 08/13/2020 2.3  gm/dL Final   • A/G Ratio 08/13/2020 1.9  g/dL Final   • BUN/Creatinine Ratio 08/13/2020 34.8* 7.0 - 25.0 Final   • Anion Gap 08/13/2020 7.7  5.0 - 15.0 mmol/L Final   • WBC 08/13/2020  10.95* 3.40 - 10.80 10*3/mm3 Final   • RBC 08/13/2020 4.26  3.77 - 5.28 10*6/mm3 Final   • Hemoglobin 08/13/2020 13.6  12.0 - 15.9 g/dL Final   • Hematocrit 08/13/2020 40.2  34.0 - 46.6 % Final   • MCV 08/13/2020 94.4  79.0 - 97.0 fL Final   • MCH 08/13/2020 31.9  26.6 - 33.0 pg Final   • MCHC 08/13/2020 33.8  31.5 - 35.7 g/dL Final   • RDW 08/13/2020 12.3  12.3 - 15.4 % Final   • RDW-SD 08/13/2020 42.1  37.0 - 54.0 fl Final   • MPV 08/13/2020 11.3  6.0 - 12.0 fL Final   • Platelets 08/13/2020 142  140 - 450 10*3/mm3 Final   • TSH 08/13/2020 7.730* 0.270 - 4.200 uIU/mL Final       Diagnoses and all orders for this visit:    1. Bruxism (teeth grinding) (Primary)  Comments:  Recommend mouthguard especially at nighttime, refer to sleep center, keep follow-up with dentist as well  Orders:  -     Ambulatory Referral to Sleep Medicine    2. Other insomnia  Comments:  Increase trazodone to 300 mg, which patient is currently taking two 150mg and effective  Orders:  -     traZODone (DESYREL) 300 MG tablet; Take 1 tablet by mouth At Night As Needed for Sleep.  Dispense: 90 tablet; Refill: 1    3. Snoring  Comments:  Referral to sleep center  Orders:  -     Ambulatory Referral to Sleep Medicine    4. Bipolar disorder, current episode depressed, severe, without psychotic features (CMS/[facility])  Comments:  Continue Vraylar and refer to psychiatry  Orders:  -     Ambulatory Referral to Psychiatry    5. Anxiety  Comments:  Patient has failed hydroxyzine and buspirone d/t ineffect. ok for low-dose clonazepam as needed. Will DC [place].   Orders:  -     Ambulatory Referral to Psychiatry  -     clonazePAM (KlonoPIN) 0.5 MG tablet; Take 1 tablet by mouth 2 (Two) Times a Day As Needed for Anxiety.  Dispense: 60 tablet; Refill: 2    6. Acquired hypothyroidism  Comments:  Have been titrating levothyroxine, last TSH 7.7, check TSH today and adjust medication as needed  Orders:  -     TSH  -     CBC (No Diff)    7. Preventative health  care  Comments:  Flu shot today  Colonoscopy completed, no polyps, repeat in 10 years  Preventative labs as ordered, will call patient results  Orders:  -     Hepatitis C antibody; Future    8. Osteoarthritis of spine with radiculopathy, cervicothoracic region  Comments:  Patient reports C-spine pain has improved and only using gabapentin and Robaxin as needed. DC [place]. see/refer neurosurg if s/s recur.     Other orders  -     Fluarix/Fluzone/Afluria Quad>6 Months      Return in about 3 months (around 1/21/2021) for anxiety, bipolar depression, pain.

## 2020-10-22 DIAGNOSIS — F31.4 BIPOLAR DISORDER, CURRENT EPISODE DEPRESSED, SEVERE, WITHOUT PSYCHOTIC FEATURES (HCC): ICD-10-CM

## 2020-10-22 LAB
DEPRECATED RDW RBC AUTO: 46.3 FL (ref 37–54)
ERYTHROCYTE [DISTWIDTH] IN BLOOD BY AUTOMATED COUNT: 13.1 % (ref 12.3–15.4)
HCT VFR BLD AUTO: 45.1 % (ref 34–46.6)
HGB BLD-MCNC: 15.3 G/DL (ref 12–15.9)
MCH RBC QN AUTO: 32.3 PG (ref 26.6–33)
MCHC RBC AUTO-ENTMCNC: 33.9 G/DL (ref 31.5–35.7)
MCV RBC AUTO: 95.3 FL (ref 79–97)
PLATELET # BLD AUTO: 147 10*3/MM3 (ref 140–450)
PMV BLD AUTO: 11.2 FL (ref 6–12)
RBC # BLD AUTO: 4.73 10*6/MM3 (ref 3.77–5.28)
TSH SERPL DL<=0.05 MIU/L-ACNC: 7.6 UIU/ML (ref 0.27–4.2)
WBC # BLD AUTO: 8.19 10*3/MM3 (ref 3.4–10.8)

## 2020-10-22 NOTE — TELEPHONE ENCOUNTER
LOV 10/21/20    It looks like both medications were discontinued back in September. Okay to deny?

## 2020-10-23 RX ORDER — CARIPRAZINE 3 MG/1
CAPSULE, GELATIN COATED ORAL
Qty: 30 CAPSULE | Refills: 0 | OUTPATIENT
Start: 2020-10-23

## 2020-10-23 RX ORDER — BUSPIRONE HYDROCHLORIDE 5 MG/1
5 TABLET ORAL 2 TIMES DAILY PRN
Qty: 60 TABLET | Refills: 0 | OUTPATIENT
Start: 2020-10-23

## 2020-10-27 DIAGNOSIS — F41.9 ANXIETY: ICD-10-CM

## 2020-10-27 RX ORDER — CLONAZEPAM 0.5 MG/1
0.5 TABLET ORAL 2 TIMES DAILY PRN
Qty: 60 TABLET | Refills: 2 | OUTPATIENT
Start: 2020-10-27

## 2020-10-27 NOTE — TELEPHONE ENCOUNTER
Pt reports that the clonazepam is helping a lot.  She said she's not grinding her teeth.  She would like a refill because today is her last dose.

## 2020-10-28 ENCOUNTER — TELEPHONE (OUTPATIENT)
Dept: FAMILY MEDICINE CLINIC | Facility: CLINIC | Age: 60
End: 2020-10-28

## 2020-10-28 NOTE — TELEPHONE ENCOUNTER
Pt asked why she is only receiving a week's worth of clonazepam.  I called to explain that Elise refilled the medication for a month's supply, so it is likely insurance or pharmacy related.  I told her she should contact her pharmacy and/or insurance company.

## 2020-11-06 DIAGNOSIS — E03.9 ACQUIRED HYPOTHYROIDISM: Primary | ICD-10-CM

## 2020-11-06 RX ORDER — LEVOTHYROXINE SODIUM 137 UG/1
137 TABLET ORAL DAILY
Qty: 30 TABLET | Refills: 2 | Status: SHIPPED | OUTPATIENT
Start: 2020-11-06 | End: 2021-02-05

## 2020-11-10 ENCOUNTER — OFFICE VISIT (OUTPATIENT)
Dept: SLEEP MEDICINE | Facility: CLINIC | Age: 60
End: 2020-11-10

## 2020-11-10 VITALS
DIASTOLIC BLOOD PRESSURE: 88 MMHG | BODY MASS INDEX: 16.66 KG/M2 | HEART RATE: 98 BPM | SYSTOLIC BLOOD PRESSURE: 131 MMHG | WEIGHT: 100 LBS | HEIGHT: 65 IN | OXYGEN SATURATION: 98 %

## 2020-11-10 DIAGNOSIS — G47.10 HYPERSOMNIA: ICD-10-CM

## 2020-11-10 DIAGNOSIS — F51.01 PRIMARY INSOMNIA: ICD-10-CM

## 2020-11-10 DIAGNOSIS — G47.63 SLEEP-RELATED BRUXISM: ICD-10-CM

## 2020-11-10 DIAGNOSIS — R06.83 SNORING: ICD-10-CM

## 2020-11-10 DIAGNOSIS — F41.9 ANXIETY: ICD-10-CM

## 2020-11-10 DIAGNOSIS — G47.30 OBSERVED SLEEP APNEA: Primary | ICD-10-CM

## 2020-11-10 PROCEDURE — G0463 HOSPITAL OUTPT CLINIC VISIT: HCPCS

## 2020-11-10 PROCEDURE — 99204 OFFICE O/P NEW MOD 45 MIN: CPT | Performed by: INTERNAL MEDICINE

## 2020-11-10 RX ORDER — ZOLPIDEM TARTRATE 5 MG/1
5 TABLET ORAL TAKE AS DIRECTED
Qty: 2 TABLET | Refills: 0 | Status: SHIPPED | OUTPATIENT
Start: 2020-11-10 | End: 2020-11-24

## 2020-11-10 NOTE — PROGRESS NOTES
[facility]  [address] [place] [address]  Phone   Fax       Michell Mckeon  1960  60 y.o.  female      Referring Provider and PCP Elise Luna APRN    Type of service: Initial consult  Date of service: 11/10/2020      Chief Complaint   Patient presents with   • Witnessed Apnea   • Snoring   • Fatigue   • Daytime Sleepiness       History of present illness;  Thank you for asking to see Michell Mckeon, 60 y.o.  for evaluation of sleep apnea. The patient was seen today on 11/10/2020 at [facility].   Patient has been sent for evaluation of sleep apnea she reports that she is under a lot of stress.  She also has significant amount of teeth grinding.  She says that she has broken some teeth.  She also has witnessed apneas and significant amount of snoring.  She also complains of chronic pain and anxiety and insomnia.    Patient gives the following sleep history.  Sleep schedule:  Bedtime: 10 PM  Wake time: 8 AM  Normally takes about 30-60 minutes to fall asleep  Average hours of sleep 7-8  Number of naps per day no  When patient wakes up still feels tired and not rested  Snoring; yes  Witnessed apneas; yes  Have you ever awakened gasping for breath, coughing, choking: Yes  Grinding teeth  Inability to sleep      Past Medical History:   Diagnosis Date   • Anemia    • Anxiety    • Back pain    • Chronic pain    • DDD (degenerative disc disease), cervical    • Depression    • Excessive daytime sleepiness    • Generalized headaches    • Head injury    • High cholesterol    • IBS (irritable bowel syndrome)    • Insomnia    • Neck pain    • Obstructive sleep apnea    • Osteoarthritis    • Osteoarthritis    • Osteoporosis    • Restless leg syndrome    • Rheumatoid arthritis (CMS/HCC)    • Snoring        Past Surgical history   has a past surgical history that includes Breast lumpectomy (Left); Tonsillectomy; and Knee surgery.     Social  history:  Shift work: No  Tobacco use: Yes  Alcohol use: Social  Caffeinated drinks: 3-5  Over-the-counter sleeping aid and medications: Takes trazodone and clonazepam  Narcotic medications: No    Family Hx  Family history of sleep apnea, negative for sleep apnea  Family History   Problem Relation Age of Onset   • Arthritis Mother    • Heart disease Mother    • Hypertension Mother    • Pancreatic cancer Other    • Diabetes Other    • Heart disease Other    • Hypertension Other    • Hyperlipidemia Other        Medications: reviewed    Current Outpatient Medications:   •  acyclovir (ZOVIRAX) 400 MG tablet, TAKE 1 TABLET BY MOUTH 4 TO 5 TIMES DAILY, Disp: 50 tablet, Rfl: 0  •  atorvastatin (LIPITOR) 20 MG tablet, Take 1 tablet by mouth every night at bedtime., Disp: 90 tablet, Rfl: 1  •  Calcium 600-200 MG-UNIT per tablet, Take 2 tablets by mouth Daily., Disp: 60 tablet, Rfl: 11  •  Cariprazine HCl (Vraylar) 6 MG capsule, Take 1 capsule by mouth Daily., Disp: 30 capsule, Rfl: 2  •  clonazePAM (KlonoPIN) 0.5 MG tablet, Take 1 tablet by mouth 2 (Two) Times a Day As Needed for Anxiety., Disp: 60 tablet, Rfl: 2  •  diclofenac (VOLTAREN) 1 % gel gel, Apply 4 g topically to the appropriate area as directed 4 (Four) Times a Day As Needed (pain)., Disp: 150 g, Rfl: 0  •  diclofenac (VOLTAREN) 75 MG EC tablet, TAKE 1 TABLET BY MOUTH TWICE A DAY, Disp: 60 tablet, Rfl: 5  •  dicyclomine (BENTYL) 10 MG capsule, TAKE 1 CAPSULE BY MOUTH EVERY SIX HOURS AS NEEDED FOR PAIN FOR 30 DAYS, Disp: , Rfl:   •  gabapentin (NEURONTIN) 600 MG tablet, TAKE 1 TABLET BY MOUTH THREE TIMES A DAY, Disp: 90 tablet, Rfl: 1  •  levothyroxine (SYNTHROID, LEVOTHROID) 137 MCG tablet, Take 1 tablet by mouth Daily., Disp: 30 tablet, Rfl: 2  •  methocarbamol (ROBAXIN) 750 MG tablet, TAKE 1 TABLET BY MOUTH THREE TIMES A DAY AS NEEDED FOR MUSCLE SPASMS, Disp: 60 tablet, Rfl: 2  •  traZODone (DESYREL) 300 MG tablet, Take 1 tablet by mouth At Night As Needed for  "Sleep., Disp: 90 tablet, Rfl: 1  •  zolpidem (AMBIEN) 5 MG tablet, Take 1 tablet by mouth Take As Directed for 2 doses. Bring the medication to the sleep lab. DO NOT USE AT HOME, Disp: 2 tablet, Rfl: 0    Review of systems:  [place] Sleepiness Scale: Total score: 13   Positive for snoring, witnessed apneas, fatigue and daytime excessive sleepiness,   Negative for shortness of breath, cough, wheezing, chest pain, nausea and vomiting, palpitation, swelling of feet:    Morning headaches: Yes  Awaken with sore throat or dry mouth : Yes  Leg jerking at night: No  Crawly feeling/urge sensation to move in the legs: no  Teeth grinding: Yes  Sleepwalking, nightmares, muscle weakness with laughing or anger,sleep paralysis: No  Nasal Congestion: No  Nocturia (how many times/night): 1-2  Memory Problem: No  Change in weight, no    Physical exam:  Vitals:    11/10/20 1338   BP: 131/88   BP Location: Right arm   Patient Position: Sitting   Cuff Size: Adult   Pulse: 98   SpO2: 98%   Weight: 45.4 kg (100 lb)   Height: 165.1 cm (65\")    Body mass index is 16.64 kg/m². Neck Circumference: 13 inches  HEENT: Head is atraumatic, normocephalic   Eyes:pupils are round equal and reacting to light and accommodation, conjunctiva normal  Nose:no nasal septal defects or deviation and the nasal passages are clear, no nasal polyps,   Throat: tonsils are not enlarged, tongue normal size, oral airway Mallampati class 3  NECK: No lymphadenopathy, trachea is in the midline, thyroid not enlarged  RESPIRATORY SYSTEM: Breath sounds are equal on both sides, there are no wheezes or crackles  CARDIOVASULAR SYSTEM: Heart sounds are regular rhythm and kalin rate, there are no murmurs or thrills  ABDOMEN: Soft, no hepatosplenomegaly, no evidence of ascites  EXTREMITES: No cyanosis, clubbing or edema   NEUROLOGICAL SYSTEM: Oriented x 3, no gross neurological defects, gait normal    Medical records and labs reviewed in [facility] reviewed    Assessment and " plan:  · Sleep apnea unspecified, (G47.30) Patient's symptoms and examination is consistent with sleep apnea.  I have talked to the patient about the signs and symptoms of sleep apnea and consequences of untreated sleep apnea. Discussed sleep testing.  I have placed a order in epic for a full night polysomnography.  I have also given a prescription for zolpidem to be used in the sleep center for sleep study.  Patient will have a follow-up after this sleep test is done.   · Snoring secondary to sleep apnea  · Hypersomnia with [place] Sleepiness Scale of Total score: 13 due to sleep apnea.  · Bruxism, continue clonazepam for anxiety and she may need a mouthguard  · Insomnia chronic, patient is already on trazodone 300 mg at bedtime which has helped her.  · Anxiety.  Continue clonazepam      I once again thank you for asking me to see this patient in consultation and I have forwarded my opinion and treatment plan.  Please do not hesitate to call me if you have any questions.     Fabio Kim MD  Sleep Medicine.(Board-certified)  [facility]  Medical Director for [facility]  11/10/2020 ,    Addendum  Insurance is denied in lab polysomnography  Plan Home sleep test    Fabio Kim MD  11/13/2020

## 2020-11-11 ENCOUNTER — DOCUMENTATION (OUTPATIENT)
Dept: SLEEP MEDICINE | Facility: HOSPITAL | Age: 60
End: 2020-11-11

## 2020-11-11 NOTE — PROGRESS NOTES
Patient insurance denied an in lab study only approved home sleep study .Note routed to Dr. Kim for home study order and message left for patient to call us. Appointment on 11/21/20 cancelled.

## 2020-11-13 DIAGNOSIS — G47.10 HYPERSOMNIA: ICD-10-CM

## 2020-11-13 DIAGNOSIS — R06.83 SNORING: ICD-10-CM

## 2020-11-13 DIAGNOSIS — G47.30 OBSERVED SLEEP APNEA: Primary | ICD-10-CM

## 2020-11-17 ENCOUNTER — TELEPHONE (OUTPATIENT)
Dept: FAMILY MEDICINE CLINIC | Facility: CLINIC | Age: 60
End: 2020-11-17

## 2020-11-17 NOTE — TELEPHONE ENCOUNTER
Pt reports that she has been cleaning and moving things around today, so she is having shoulder and neck pain again.  She reports 6.5 on pain scale.  She asked if you could send norco again and she will stop taking her clonazepam?

## 2020-11-18 ENCOUNTER — OFFICE VISIT (OUTPATIENT)
Dept: PODIATRY | Facility: CLINIC | Age: 60
End: 2020-11-18

## 2020-11-18 VITALS
HEART RATE: 96 BPM | SYSTOLIC BLOOD PRESSURE: 135 MMHG | WEIGHT: 103 LBS | DIASTOLIC BLOOD PRESSURE: 84 MMHG | HEIGHT: 65 IN | BODY MASS INDEX: 17.16 KG/M2

## 2020-11-18 DIAGNOSIS — M21.6X2 EQUINUS DEFORMITY OF BOTH FEET: ICD-10-CM

## 2020-11-18 DIAGNOSIS — M79.671 BILATERAL FOOT PAIN: Primary | ICD-10-CM

## 2020-11-18 DIAGNOSIS — M79.672 BILATERAL FOOT PAIN: Primary | ICD-10-CM

## 2020-11-18 DIAGNOSIS — M21.6X1 EQUINUS DEFORMITY OF BOTH FEET: ICD-10-CM

## 2020-11-18 DIAGNOSIS — G57.63 MORTON METATARSALGIA, BILATERAL: ICD-10-CM

## 2020-11-18 PROCEDURE — 99203 OFFICE O/P NEW LOW 30 MIN: CPT | Performed by: PODIATRIST

## 2020-11-19 NOTE — PATIENT INSTRUCTIONS
Infante Neuralgia    Infante neuralgia is foot pain that affects the ball of the foot and the area near the toes. Infante neuralgia occurs when part of a nerve in the foot (digital nerve) is under too much pressure (compressed). When this happens over a long period of time, the nerve can thicken (neuroma) and cause pain. Pain usually occurs between the third and fourth toes.   Infante neuralgia can come and go but may get worse over time.  What are the causes?  This condition is caused by doing the same things over and over with your foot, such as:  · Activities such as running or jumping.  · Wearing shoes that are too tight.  What increases the risk?  You may be at higher risk for Infante neuralgia if you:  · Are female.  · Wear high heels.  · Wear shoes that are narrow or tight.  · Do activities that repeatedly stretch your toes, such as:  ? Running.  ? Ballet.  ? Long-distance walking.  What are the signs or symptoms?  The first symptom of Infante neuralgia is pain that spreads from the ball of the foot to the toes. It may feel like you are walking on a marble. Pain usually gets worse with walking and goes away at night. Other symptoms may include numbness and cramping of your toes. Both feet are equally affected, but rarely at the same time.  How is this diagnosed?  This condition is diagnosed based on your symptoms, your medical history, and a physical exam. Your health care provider may:  · Squeeze your foot just behind your toe.  · Ask you to move your toes to check for pain.  · Ask about your physical activity level.  You also may have imaging tests, such as an X-ray, ultrasound, or MRI.  How is this treated?  Treatment depends on how severe your condition is and what causes it. Treatment may involve:  · Wearing different shoes that are not too tight, are low-heeled, and provide good support. For some people, this is the only treatment needed.  · Wearing an over-the-counter or custom supportive pad (orthotic)  under the front of your foot.  · Getting injections of numbing medicine and anti-inflammatory medicine (steroid) in the nerve.  · Having surgery to remove part of the thickened nerve.  Follow these instructions at home:  Managing pain, stiffness, and swelling    · Massage your foot as needed.  · Wear orthotics as told by your health care provider.  · If directed, put ice on your foot:  ? Put ice in a plastic bag.  ? Place a towel between your skin and the bag.  ? Leave the ice on for 20 minutes, 2-3 times a day.  · Avoid activities that cause pain or make pain worse. If you play sports, ask your health care provider when it is safe for you to return to sports.  · Raise (elevate) your foot above the level of your heart while lying down and, when possible, while sitting.  General instructions  · Take over-the-counter and prescription medicines only as told by your health care provider.  · Do not drive or use heavy machinery while taking prescription pain medicine.  · Wear shoes that:  ? Have soft soles.  ? Have a wide toe area.  ? Provide arch support.  ? Do not pinch or squeeze your feet.  ? Have room for your orthotics, if applicable.  · Keep all follow-up visits as told by your health care provider. This is important.  Contact a health care provider if:  · Your symptoms get worse or do not get better with treatment and home care.  Summary  · Infante neuralgia is foot pain that affects the ball of the foot and the area near the toes. Pain usually occurs between the third and fourth toes, gets worse with walking, and goes away at night.  · Infante neuralgia occurs when part of a nerve in the foot (digital nerve) is under too much pressure. When this happens over a long period of time, the nerve can thicken (neuroma) and cause pain.  · This condition is caused by doing the same things over and over with your foot, such as running or jumping, wearing shoes that are too tight, or wearing high heels.  · Treatment may  involve wearing low-heeled shoes that are not too tight, wearing a supportive pad (orthotic) under the front of your foot, getting injections in the nerve, or having surgery to remove part of the thickened nerve.  This information is not intended to replace advice given to you by your health care provider. Make sure you discuss any questions you have with your health care provider.  Document Released: 03/26/2002 Document Revised: 01/01/2019 Document Reviewed: 01/01/2019  [employer] Patient Education © 2020 [employer]

## 2020-11-19 NOTE — PROGRESS NOTES
11/18/2020  Foot and Ankle Surgery - New Patient   Provider: Dr. Elpidio Pleitez DPM  Location: [facility]    Subjective:  Michell Mckeon is a 60 y.o. female.     Chief Complaint   Patient presents with   • Left Foot - Pain   • Right Foot - Pain       HPI: Patient is a pleasant 60-year-old female that presents with bilateral foot pain.  She localizes the pain to the forefoot region and noticed with increased activity.  She complains of numbness and tingling at x2.  Her symptoms are mostly directed at the third and fourth digits.  Her symptoms do sometimes improve with rest.  She is unaware of any previous injury.  She does have a history of back issues and complains of spasms and tightness to her legs.  She rates the pain to the feet a 7 out of 10 most days.  She states that she mostly wears flats and sometimes heels.    No Known Allergies    Past Medical History:   Diagnosis Date   • Anemia    • Anxiety    • Back pain    • Chronic pain    • DDD (degenerative disc disease), cervical    • Depression    • Excessive daytime sleepiness    • Generalized headaches    • Head injury    • High cholesterol    • IBS (irritable bowel syndrome)    • Insomnia    • Neck pain    • Obstructive sleep apnea    • Osteoarthritis    • Osteoarthritis    • Osteoporosis    • Restless leg syndrome    • Rheumatoid arthritis (CMS/HCC)    • Snoring        Past Surgical History:   Procedure Laterality Date   • BREAST LUMPECTOMY Left     x3   • KNEE SURGERY     • TONSILLECTOMY         Family History   Problem Relation Age of Onset   • Arthritis Mother    • Heart disease Mother    • Hypertension Mother    • Pancreatic cancer Other    • Diabetes Other    • Heart disease Other    • Hypertension Other    • Hyperlipidemia Other        Social History     Socioeconomic History   • Marital status: Legally      Spouse name: Not on file   • Number of children: Not on file   • Years of education: Not on file   • Highest education level:  Not on file   Tobacco Use   • Smoking status: Current Every Day Smoker   • Smokeless tobacco: Never Used   Substance and Sexual Activity   • Alcohol use: Yes   • Drug use: Never   • Sexual activity: Defer        Current Outpatient Medications on File Prior to Visit   Medication Sig Dispense Refill   • acyclovir (ZOVIRAX) 400 MG tablet TAKE 1 TABLET BY MOUTH 4 TO 5 TIMES DAILY 50 tablet 0   • atorvastatin (LIPITOR) 20 MG tablet Take 1 tablet by mouth every night at bedtime. 90 tablet 1   • Calcium 600-200 MG-UNIT per tablet Take 2 tablets by mouth Daily. 60 tablet 11   • Cariprazine HCl (Vraylar) 6 MG capsule Take 1 capsule by mouth Daily. 30 capsule 2   • clonazePAM (KlonoPIN) 0.5 MG tablet Take 1 tablet by mouth 2 (Two) Times a Day As Needed for Anxiety. 60 tablet 2   • diclofenac (VOLTAREN) 1 % gel gel Apply 4 g topically to the appropriate area as directed 4 (Four) Times a Day As Needed (pain). 150 g 0   • diclofenac (VOLTAREN) 75 MG EC tablet TAKE 1 TABLET BY MOUTH TWICE A DAY 60 tablet 5   • dicyclomine (BENTYL) 10 MG capsule TAKE 1 CAPSULE BY MOUTH EVERY SIX HOURS AS NEEDED FOR PAIN FOR 30 DAYS     • gabapentin (NEURONTIN) 600 MG tablet TAKE 1 TABLET BY MOUTH THREE TIMES A DAY 90 tablet 1   • levothyroxine (SYNTHROID, LEVOTHROID) 137 MCG tablet Take 1 tablet by mouth Daily. 30 tablet 2   • methocarbamol (ROBAXIN) 750 MG tablet TAKE 1 TABLET BY MOUTH THREE TIMES A DAY AS NEEDED FOR MUSCLE SPASMS 60 tablet 2   • traZODone (DESYREL) 300 MG tablet Take 1 tablet by mouth At Night As Needed for Sleep. 90 tablet 1   • zolpidem (AMBIEN) 5 MG tablet Take 1 tablet by mouth Take As Directed for 2 doses. Bring the medication to the sleep lab. DO NOT USE AT HOME 2 tablet 0     No current facility-administered medications on file prior to visit.        Review of Systems:  General: Denies fever, chills, fatigue, and weakness.  Eyes: Denies vision loss, blurry vision, and excessive redness.  ENT: Denies hearing issues and  "difficulty swallowing.  Cardiovascular: Denies palpitations, chest pain, or syncopal episodes.  Respiratory: Denies shortness of breath, wheezing, and coughing.  GI: Denies abdominal pain, nausea, and vomiting.   : Denies frequency, hematuria, and urgency.  Musculoskeletal: + Bilateral foot pain  Derm: Denies rash, open wounds, or suspicious lesions.  Neuro: Denies headaches, numbness, loss of coordination, and tremors.  Psych: Denies anxiety and depression.  Endocrine: Denies temperature intolerance and changes in appetite.  Heme: Denies bleeding disorders or abnormal bruising.     Objective   /84   Pulse 96   Ht 165.1 cm (65\")   Wt 46.7 kg (103 lb)   BMI 17.14 kg/m²     Foot/Ankle Exam:       General:   Appearance: appears stated age and healthy    Orientation: AAOx3    Affect: appropriate    Gait: antalgic      VASCULAR      Right Foot Vascularity   Normal vascular exam    Dorsalis pedis:  2+  Posterior tibial:  2+  Skin Temperature: warm    Edema Grading:  None  CFT:  < 3 seconds  Pedal Hair Growth:  Present  Varicosities: none       Left Foot Vascularity   Normal vascular exam    Dorsalis pedis:  2+  Posterior tibial:  2+  Skin Temperature: warm    Edema Grading:  None  CFT:  < 3 seconds  Pedal Hair Growth:  Present  Varicosities: none        NEUROLOGIC     Right Foot Neurologic   Light touch sensation:  Normal  Hot/Cold sensation: normal    Achilles reflex:  2+     Left Foot Neurologic   Light touch sensation:  Normal  Hot/cold sensation: normal    Achilles reflex:  2+     MUSCULOSKELETAL      Right Foot Musculoskeletal   Ecchymosis:  None  Tenderness: lesser metatarsophalangeal joints    Arch:  Normal  Hammertoe:  Second toe, third toe, fourth toe and fifth toe (Reducible)     Left Foot Musculoskeletal   Ecchymosis:  None  Tenderness: lesser metatarsophalangeal joints    Arch:  Normal  Hammertoe:  Second toe, third toe, fourth toe and fifth toe (Reducible)     MUSCLE STRENGTH     Right Foot Muscle " Strength   Normal strength    Foot dorsiflexion:  5  Foot plantar flexion:  5  Foot inversion:  5  Foot eversion:  5     Left Foot Muscle Strength   Normal strength    Foot dorsiflexion:  5  Foot plantar flexion:  5  Foot inversion:  5  Foot eversion:  5     DERMATOLOGIC     Right Foot Dermatologic   Skin: skin intact       Left Foot Dermatologic   Skin: skin intact        Right Foot Additional Comments Moderate equinus contracture with knee extended and flexed, bilateral.  Moderate discomfort with palpation of the third interspace, bilateral.  Negative Gregorio sign.  Negative Coleman sign.  Mild fat pad atrophy to the forefoot, bilateral.  No gross deformity or instability.      Assessment/Plan   Diagnoses and all orders for this visit:    1. Bilateral foot pain (Primary)  -     XR Foot 3+ View Bilateral    2. Infante metatarsalgia, bilateral    3. Equinus deformity of both feet      Patient presents with issues involving the forefoot, bilateral.  Imaging was reviewed showing no obvious fractures or dislocations.  She does have mild shortening involving the second metatarsal that she states is due to a prior fracture.  I explained the imaging, diagnosis, and treatment options at length.  I do feel that her issues are secondary to increased forefoot stress and lack of support.  I have asked that she obtain a pair of over-the-counter arch supports with metatarsal pad for forefoot offloading.  We also reviewed proper stretching and manual therapy exercises.  She is to avoid barefoot and unsupported weightbearing.  I would like her to monitor closely and follow-up with me in 4 weeks for reevaluation.    Orders Placed This Encounter   Procedures   • XR Foot 3+ View Bilateral     Weight Bearing     Order Specific Question:   Reason for Exam:     Answer:   bilaterla foot pain roight foor 2nd toe and bottom of foot left foot 2nd and 3rd toe ant bootm of foot RM 14 WB        Note is dictated utilizing voice recognition  software. Unfortunately this leads to occasional typographical errors. I apologize in advance if the situation occurs. If questions occur please do not hesitate to call our office.

## 2020-11-21 ENCOUNTER — HOSPITAL ENCOUNTER (OUTPATIENT)
Dept: SLEEP MEDICINE | Facility: HOSPITAL | Age: 60
Discharge: HOME | End: 2020-11-21
Admitting: INTERNAL MEDICINE

## 2020-11-21 DIAGNOSIS — G47.30 OBSERVED SLEEP APNEA: ICD-10-CM

## 2020-11-21 DIAGNOSIS — R06.83 SNORING: ICD-10-CM

## 2020-11-21 DIAGNOSIS — G47.10 HYPERSOMNIA: ICD-10-CM

## 2020-11-21 PROCEDURE — 95806 SLEEP STUDY UNATT&RESP EFFT: CPT

## 2020-11-21 PROCEDURE — 95806 SLEEP STUDY UNATT&RESP EFFT: CPT | Performed by: INTERNAL MEDICINE

## 2020-11-24 ENCOUNTER — OFFICE VISIT (OUTPATIENT)
Dept: FAMILY MEDICINE CLINIC | Facility: CLINIC | Age: 60
End: 2020-11-24

## 2020-11-24 VITALS
WEIGHT: 99.8 LBS | OXYGEN SATURATION: 100 % | HEIGHT: 65 IN | TEMPERATURE: 97.7 F | SYSTOLIC BLOOD PRESSURE: 133 MMHG | BODY MASS INDEX: 16.63 KG/M2 | HEART RATE: 88 BPM | DIASTOLIC BLOOD PRESSURE: 83 MMHG

## 2020-11-24 DIAGNOSIS — F31.4 BIPOLAR DISORDER, CURRENT EPISODE DEPRESSED, SEVERE, WITHOUT PSYCHOTIC FEATURES (HCC): ICD-10-CM

## 2020-11-24 DIAGNOSIS — M47.812 FACET ARTHROPATHY, CERVICAL: ICD-10-CM

## 2020-11-24 DIAGNOSIS — M47.23 OSTEOARTHRITIS OF SPINE WITH RADICULOPATHY, CERVICOTHORACIC REGION: Primary | ICD-10-CM

## 2020-11-24 DIAGNOSIS — R76.8 RHEUMATOID FACTOR POSITIVE: ICD-10-CM

## 2020-11-24 PROCEDURE — 99214 OFFICE O/P EST MOD 30 MIN: CPT | Performed by: NURSE PRACTITIONER

## 2020-11-24 RX ORDER — GABAPENTIN 600 MG/1
600 TABLET ORAL 3 TIMES DAILY
Qty: 90 TABLET | Refills: 2 | Status: SHIPPED | OUTPATIENT
Start: 2020-11-24 | End: 2021-02-23

## 2020-11-24 RX ORDER — CARIPRAZINE 6 MG/1
1 CAPSULE, GELATIN COATED ORAL DAILY
Qty: 90 CAPSULE | Refills: 1 | Status: SHIPPED | OUTPATIENT
Start: 2020-11-24 | End: 2021-08-19

## 2020-11-24 NOTE — PROGRESS NOTES
Chief Complaint   Patient presents with   • Shoulder Pain     discuss norco and clonazepam   • Sleep Apnea     sleep study completed, would like results.  she says she has apnea, needs to have results sent to [facility].       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  She was provided a short term hydrocodone supply over the course of 3 to 4 months while awaiting pain management and/or rheumatologist appts, however through Covid pandemic patient has been unable to be seen by specialists.     Bipolar d/o, symptoms improving on Vraylar, has not been using clonazepam, would like to discontinue so she can resume pain medication.     Snoring, bruxism, patient completed sleep study with [facility], she is requesting results      The following portions of the patient's history were reviewed and updated as appropriate: allergies, current medications, past family history, past medical history, past social history, past surgical history and problem list.    Past Medical History:   Diagnosis Date   • Anemia    • Anxiety    • Back pain    • Chronic pain    • DDD (degenerative disc disease), cervical    • Depression    • Excessive daytime sleepiness    • Generalized headaches    • Head injury    • High cholesterol    • IBS (irritable bowel syndrome)    • Insomnia    • Neck pain    • Obstructive sleep apnea    • Osteoarthritis    • Osteoarthritis    • Osteoporosis    • Restless leg syndrome    • Rheumatoid arthritis (CMS/HCC)    • Snoring      Past Surgical History:   Procedure Laterality Date   • BREAST LUMPECTOMY Left     x3   • KNEE SURGERY     • TONSILLECTOMY       Family History   Problem  Relation Age of Onset   • Arthritis Mother    • Heart disease Mother    • Hypertension Mother    • Pancreatic cancer Other    • Diabetes Other    • Heart disease Other    • Hypertension Other    • Hyperlipidemia Other      Social History     Tobacco Use   • Smoking status: Current Every Day Smoker   • Smokeless tobacco: Never Used   Substance Use Topics   • Alcohol use: Yes         Current Outpatient Medications:   •  acyclovir (ZOVIRAX) 400 MG tablet, TAKE 1 TABLET BY MOUTH 4 TO 5 TIMES DAILY, Disp: 50 tablet, Rfl: 0  •  atorvastatin (LIPITOR) 20 MG tablet, Take 1 tablet by mouth every night at bedtime., Disp: 90 tablet, Rfl: 1  •  Calcium 600-200 MG-UNIT per tablet, Take 2 tablets by mouth Daily., Disp: 60 tablet, Rfl: 11  •  Cariprazine HCl (Vraylar) 6 MG capsule, Take 1 capsule by mouth Daily., Disp: 90 capsule, Rfl: 1  •  clonazePAM (KlonoPIN) 0.5 MG tablet, Take 1 tablet by mouth 2 (Two) Times a Day As Needed for Anxiety., Disp: 60 tablet, Rfl: 2  •  diclofenac (VOLTAREN) 1 % gel gel, Apply 4 g topically to the appropriate area as directed 4 (Four) Times a Day As Needed (pain)., Disp: 150 g, Rfl: 0  •  diclofenac (VOLTAREN) 75 MG EC tablet, TAKE 1 TABLET BY MOUTH TWICE A DAY, Disp: 60 tablet, Rfl: 5  •  dicyclomine (BENTYL) 10 MG capsule, TAKE 1 CAPSULE BY MOUTH EVERY SIX HOURS AS NEEDED FOR PAIN FOR 30 DAYS, Disp: , Rfl:   •  gabapentin (NEURONTIN) 600 MG tablet, Take 1 tablet by mouth 3 (Three) Times a Day., Disp: 90 tablet, Rfl: 2  •  levothyroxine (SYNTHROID, LEVOTHROID) 137 MCG tablet, Take 1 tablet by mouth Daily., Disp: 30 tablet, Rfl: 2  •  methocarbamol (ROBAXIN) 750 MG tablet, TAKE 1 TABLET BY MOUTH THREE TIMES A DAY AS NEEDED FOR MUSCLE SPASMS, Disp: 60 tablet, Rfl: 2  •  traZODone (DESYREL) 300 MG tablet, Take 1 tablet by mouth At Night As Needed for Sleep., Disp: 90 tablet, Rfl: 1      Review of Systems       Obtained as mentioned in HPI, otherwise negative.       Vitals:    11/24/20 1424   BP:  "133/83   BP Location: Left arm   Patient Position: Sitting   Cuff Size: Pediatric   Pulse: 88   Temp: 97.7 °F (36.5 °C)   TempSrc: Skin   SpO2: 100%   Weight: 45.3 kg (99 lb 12.8 oz)   Height: 165.1 cm (65\")     Body mass index is 16.61 kg/m².    Physical Exam  Vitals signs and nursing note reviewed.   Constitutional:       General: She is not in acute distress.     Appearance: She is well-developed. She is not diaphoretic.   Eyes:      Pupils: Pupils are equal, round, and reactive to light.   Neck:      Musculoskeletal: Normal range of motion and neck supple.      Thyroid: No thyromegaly.      Vascular: No JVD.   Cardiovascular:      Rate and Rhythm: Normal rate and regular rhythm.      Heart sounds: Normal heart sounds. No murmur.   Pulmonary:      Effort: Pulmonary effort is normal. No respiratory distress.      Breath sounds: Normal breath sounds.   Abdominal:      General: Bowel sounds are normal. There is no distension.      Palpations: Abdomen is soft.      Tenderness: There is no abdominal tenderness.   Musculoskeletal: Normal range of motion.         General: Tenderness present.   Skin:     General: Skin is warm and dry.   Neurological:      Mental Status: She is alert and oriented to person, place, and time.      Sensory: No sensory deficit.   Psychiatric:         Attention and Perception: Attention normal.         Mood and Affect: Mood is anxious and depressed.         Speech: Speech normal.         Behavior: Behavior normal.         Thought Content: Thought content normal.         Judgment: Judgment normal.         No visits with results within 7 Day(s) from this visit.   Latest known visit with results is:   Office Visit on 10/21/2020   Component Date Value Ref Range Status   • TSH 10/21/2020 7.600* 0.270 - 4.200 uIU/mL Final   • WBC 10/21/2020 8.19  3.40 - 10.80 10*3/mm3 Final   • RBC 10/21/2020 4.73  3.77 - 5.28 10*6/mm3 Final   • Hemoglobin 10/21/2020 15.3  12.0 - 15.9 g/dL Final   • Hematocrit " 10/21/2020 45.1  34.0 - 46.6 % Final   • MCV 10/21/2020 95.3  79.0 - 97.0 fL Final   • MCH 10/21/2020 32.3  26.6 - 33.0 pg Final   • MCHC 10/21/2020 33.9  31.5 - 35.7 g/dL Final   • RDW 10/21/2020 13.1  12.3 - 15.4 % Final   • RDW-SD 10/21/2020 46.3  37.0 - 54.0 fl Final   • MPV 10/21/2020 11.2  6.0 - 12.0 fL Final   • Platelets 10/21/2020 147  140 - 450 10*3/mm3 Final       Diagnoses and all orders for this visit:    1. Osteoarthritis of spine with radiculopathy, cervicothoracic region (Primary)  -     Ambulatory Referral to Pain Management    2. Facet arthropathy, cervical  -     Ambulatory Referral to Pain Management    3. Rheumatoid factor positive  -     Ambulatory Referral to Pain Management    4. Bipolar disorder, current episode depressed, severe, without psychotic features (CMS/HCC)    Other orders  -     gabapentin (NEURONTIN) 600 MG tablet; Take 1 tablet by mouth 3 (Three) Times a Day.  Dispense: 90 tablet; Refill: 2  -     Cariprazine HCl (Vraylar) 6 MG capsule; Take 1 capsule by mouth Daily.  Dispense: 90 capsule; Refill: 1         -No further hydrocodone, patient will be referred again to pain management, patient must make appointment, cont gabapentin, nsaid and muscle relaxants prn  -We will plan to DC clonazepam once the supply is completed, continue vraylar and pt to make appt with psych  -f/u with sleep team for results of sleep study.     Return in about 3 months (around 2/24/2021) for pt to come to office for TSH 12/21. .

## 2020-11-30 ENCOUNTER — TELEPHONE (OUTPATIENT)
Dept: FAMILY MEDICINE CLINIC | Facility: CLINIC | Age: 60
End: 2020-11-30

## 2020-12-07 ENCOUNTER — OFFICE VISIT (OUTPATIENT)
Dept: PAIN MEDICINE | Facility: CLINIC | Age: 60
End: 2020-12-07

## 2020-12-07 VITALS
DIASTOLIC BLOOD PRESSURE: 98 MMHG | SYSTOLIC BLOOD PRESSURE: 154 MMHG | RESPIRATION RATE: 16 BRPM | HEIGHT: 60 IN | BODY MASS INDEX: 19.63 KG/M2 | WEIGHT: 100 LBS | OXYGEN SATURATION: 98 % | TEMPERATURE: 96.7 F | HEART RATE: 98 BPM

## 2020-12-07 DIAGNOSIS — M47.812 CERVICAL SPONDYLOSIS: ICD-10-CM

## 2020-12-07 DIAGNOSIS — M50.30 DDD (DEGENERATIVE DISC DISEASE), CERVICAL: Primary | ICD-10-CM

## 2020-12-07 PROCEDURE — 99204 OFFICE O/P NEW MOD 45 MIN: CPT | Performed by: STUDENT IN AN ORGANIZED HEALTH CARE EDUCATION/TRAINING PROGRAM
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
## 2020-12-10 ENCOUNTER — TREATMENT (OUTPATIENT)
Dept: PHYSICAL THERAPY | Facility: CLINIC | Age: 60
End: 2020-12-10

## 2020-12-10 DIAGNOSIS — M50.30 DDD (DEGENERATIVE DISC DISEASE), CERVICAL: ICD-10-CM

## 2020-12-10 DIAGNOSIS — M47.812 CERVICAL SPONDYLOSIS: Primary | ICD-10-CM

## 2020-12-10 PROCEDURE — 97110 THERAPEUTIC EXERCISES: CPT | Performed by: PHYSICAL THERAPIST

## 2020-12-10 PROCEDURE — 97162 PT EVAL MOD COMPLEX 30 MIN: CPT | Performed by: PHYSICAL THERAPIST
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
## 2020-12-13 DIAGNOSIS — M47.23 OSTEOARTHRITIS OF SPINE WITH RADICULOPATHY, CERVICOTHORACIC REGION: ICD-10-CM

## 2020-12-15 ENCOUNTER — TREATMENT (OUTPATIENT)
Dept: PHYSICAL THERAPY | Facility: CLINIC | Age: 60
End: 2020-12-15

## 2020-12-15 DIAGNOSIS — M47.812 CERVICAL SPONDYLOSIS: Primary | ICD-10-CM

## 2020-12-15 DIAGNOSIS — M50.30 DDD (DEGENERATIVE DISC DISEASE), CERVICAL: ICD-10-CM

## 2020-12-15 PROCEDURE — 97110 THERAPEUTIC EXERCISES: CPT | Performed by: PHYSICAL THERAPIST

## 2020-12-15 PROCEDURE — 97140 MANUAL THERAPY 1/> REGIONS: CPT | Performed by: PHYSICAL THERAPIST

## 2020-12-15 PROCEDURE — 97014 ELECTRIC STIMULATION THERAPY: CPT | Performed by: PHYSICAL THERAPIST

## 2020-12-15 RX ORDER — METHOCARBAMOL 750 MG/1
TABLET, FILM COATED ORAL
Qty: 60 TABLET | Refills: 2 | Status: SHIPPED | OUTPATIENT
Start: 2020-12-15 | End: 2022-01-04
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
## 2020-12-17 ENCOUNTER — TREATMENT (OUTPATIENT)
Dept: PHYSICAL THERAPY | Facility: CLINIC | Age: 60
End: 2020-12-17

## 2020-12-17 DIAGNOSIS — M47.812 CERVICAL SPONDYLOSIS: Primary | ICD-10-CM

## 2020-12-17 DIAGNOSIS — M50.30 DDD (DEGENERATIVE DISC DISEASE), CERVICAL: ICD-10-CM

## 2020-12-17 PROCEDURE — 97140 MANUAL THERAPY 1/> REGIONS: CPT | Performed by: PHYSICAL THERAPIST

## 2020-12-17 PROCEDURE — 97014 ELECTRIC STIMULATION THERAPY: CPT | Performed by: PHYSICAL THERAPIST

## 2020-12-17 PROCEDURE — 97110 THERAPEUTIC EXERCISES: CPT | Performed by: PHYSICAL THERAPIST
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
## 2020-12-21 ENCOUNTER — TREATMENT (OUTPATIENT)
Dept: PHYSICAL THERAPY | Facility: CLINIC | Age: 60
End: 2020-12-21

## 2020-12-21 DIAGNOSIS — M50.30 DDD (DEGENERATIVE DISC DISEASE), CERVICAL: ICD-10-CM

## 2020-12-21 DIAGNOSIS — M47.812 CERVICAL SPONDYLOSIS: Primary | ICD-10-CM

## 2020-12-21 PROCEDURE — 97110 THERAPEUTIC EXERCISES: CPT | Performed by: PHYSICAL THERAPIST

## 2020-12-21 PROCEDURE — 97140 MANUAL THERAPY 1/> REGIONS: CPT | Performed by: PHYSICAL THERAPIST

## 2020-12-21 NOTE — PROGRESS NOTES
Physical Therapy Daily Progress Note    VISIT#: 4    Subjective   Michell Mckeon reports she is doing well.  Feels that she is improving in therapy.  Has injections scheduled for tomorrow but she is nervous.     Pain Rating (0/10): 4    Objective     See Exercise, Manual, and Modality Logs for complete treatment.     Assessment/Plan  Patient with less hypersensitivity to touch today.  Trialed e-stim again, but pt could not tolerate longer than 5 min.  Discontinued afterwards, no skin changes present.     Plan  Progress per Plan of Care.  Consider trial of e-stim in siting.             Timed:         Manual Therapy:   25      mins  78842;     Therapeutic Exercise:    15     mins  86315;     Neuromuscular Soumya:        mins  00700;    Therapeutic Activity:          mins  73073;     Gait Training:           mins  44227;     Ultrasound:          mins  57186;    Ionto                                   mins   99325  Self Care                            mins   36459    Un-Timed:  Electrical Stimulation:    5     mins  88177 ( );  Dry Needling          mins self-pay  Traction          mins 51080  Low Eval          Mins  66120  Mod Eval          Mins  88398  High Eval                            Mins  38461  Re-Eval                               mins  38209    Timed Treatment:   40   mins   Total Treatment:     45   mins    Gwen Denis, PT, DPT  Physical Therapist

## 2020-12-22 ENCOUNTER — TELEPHONE (OUTPATIENT)
Dept: PAIN MEDICINE | Facility: HOSPITAL | Age: 60
End: 2020-12-22

## 2020-12-22 ENCOUNTER — APPOINTMENT (OUTPATIENT)
Dept: PAIN MEDICINE | Facility: HOSPITAL | Age: 60
End: 2020-12-22

## 2020-12-22 DIAGNOSIS — E78.2 MIXED HYPERLIPIDEMIA: ICD-10-CM

## 2020-12-23 RX ORDER — ATORVASTATIN CALCIUM 20 MG/1
TABLET, FILM COATED ORAL
Qty: 90 TABLET | Refills: 3 | Status: SHIPPED | OUTPATIENT
Start: 2020-12-23 | End: 2021-08-19

## 2020-12-28 ENCOUNTER — OFFICE VISIT (OUTPATIENT)
Dept: PAIN MEDICINE | Facility: CLINIC | Age: 60
End: 2020-12-28

## 2020-12-28 VITALS
TEMPERATURE: 97.7 F | HEIGHT: 60 IN | OXYGEN SATURATION: 97 % | BODY MASS INDEX: 19.63 KG/M2 | HEART RATE: 100 BPM | WEIGHT: 100 LBS | DIASTOLIC BLOOD PRESSURE: 77 MMHG | RESPIRATION RATE: 16 BRPM | SYSTOLIC BLOOD PRESSURE: 120 MMHG

## 2020-12-28 DIAGNOSIS — M47.812 CERVICAL SPONDYLOSIS: ICD-10-CM

## 2020-12-28 DIAGNOSIS — M50.30 DDD (DEGENERATIVE DISC DISEASE), CERVICAL: Primary | ICD-10-CM

## 2020-12-28 PROCEDURE — 99213 OFFICE O/P EST LOW 20 MIN: CPT | Performed by: STUDENT IN AN ORGANIZED HEALTH CARE EDUCATION/TRAINING PROGRAM
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
## 2021-01-11 ENCOUNTER — OFFICE VISIT (OUTPATIENT)
Dept: PAIN MEDICINE | Facility: CLINIC | Age: 61
End: 2021-01-11

## 2021-01-11 ENCOUNTER — TELEPHONE (OUTPATIENT)
Dept: ORTHOPEDICS | Facility: OTHER | Age: 61
End: 2021-01-11

## 2021-01-11 VITALS
TEMPERATURE: 98 F | HEIGHT: 64 IN | HEART RATE: 107 BPM | OXYGEN SATURATION: 96 % | SYSTOLIC BLOOD PRESSURE: 128 MMHG | BODY MASS INDEX: 17.07 KG/M2 | DIASTOLIC BLOOD PRESSURE: 82 MMHG | WEIGHT: 100 LBS | RESPIRATION RATE: 16 BRPM

## 2021-01-11 DIAGNOSIS — M47.812 CERVICAL SPONDYLOSIS: ICD-10-CM

## 2021-01-11 DIAGNOSIS — M50.30 DDD (DEGENERATIVE DISC DISEASE), CERVICAL: Primary | ICD-10-CM

## 2021-01-11 DIAGNOSIS — Z79.891 ENCOUNTER FOR LONG-TERM OPIATE ANALGESIC USE: ICD-10-CM

## 2021-01-11 PROCEDURE — 99213 OFFICE O/P EST LOW 20 MIN: CPT | Performed by: STUDENT IN AN ORGANIZED HEALTH CARE EDUCATION/TRAINING PROGRAM

## 2021-01-11 RX ORDER — HYDROCODONE BITARTRATE AND ACETAMINOPHEN 5; 325 MG/1; MG/1
1 TABLET ORAL 3 TIMES DAILY PRN
Qty: 42 TABLET | Refills: 0 | Status: SHIPPED | OUTPATIENT
Start: 2021-01-11 | End: 2021-01-21

## 2021-01-11 RX ORDER — NALOXONE HYDROCHLORIDE 4 MG/.1ML
1 SPRAY NASAL AS NEEDED
Qty: 1 EACH | Refills: 0 | Status: SHIPPED | OUTPATIENT
Start: 2021-01-11 | End: 2021-02-10

## 2021-01-11 NOTE — TELEPHONE ENCOUNTER
Caller: MRS CURTIS     Relationship: PATIENT     Best call back number: [phone]  Medication needed:   Requested Prescriptions      No prescriptions requested or ordered in this encounter   HYDROCODONE REFILL      Does the patient have less than a 3 day supply:  [x] Yes  [] No    PATIENT IS ASKING FOR A CALL BACK REGARDING HER PAIN MEDICATION PT WILL BE 21 PILLS SHORT FROM THE 3 WEEKS, PROVIDER GAVE PT A 2 WEEK SCRIPT AND PROVIDER IS ON VACATION THE WEEK OF HER VISIT PT IS ASKING TO SPEAK WITH SOMEONE ABOUT WHAT NEEDS TO BE DONE UNTIL HER VISIT, TRIED TO WARM TRANSFER THE MED LINE NO ANSWER.

## 2021-01-12 ENCOUNTER — TELEPHONE (OUTPATIENT)
Dept: PAIN MEDICINE | Facility: CLINIC | Age: 61
End: 2021-01-12

## 2021-01-12 NOTE — TELEPHONE ENCOUNTER
Her appointment is next week on 1/21/2020. She has 2 week supply. Please clarify this with her.       Andrew Del Castillo DO  Pain Management   [facility]

## 2021-01-14 ENCOUNTER — TELEPHONE (OUTPATIENT)
Dept: FAMILY MEDICINE CLINIC | Facility: CLINIC | Age: 61
End: 2021-01-14

## 2021-01-14 NOTE — TELEPHONE ENCOUNTER
[employer] - Called patient to reschedule her appt on 2/24/2021 due to Elise being out of the office that afternoon.  Waiting for patient to call back.  cc

## 2021-01-21 ENCOUNTER — OFFICE VISIT (OUTPATIENT)
Dept: PAIN MEDICINE | Facility: CLINIC | Age: 61
End: 2021-01-21

## 2021-01-21 VITALS
TEMPERATURE: 98 F | OXYGEN SATURATION: 98 % | WEIGHT: 100 LBS | HEART RATE: 116 BPM | BODY MASS INDEX: 17.07 KG/M2 | HEIGHT: 64 IN | SYSTOLIC BLOOD PRESSURE: 136 MMHG | DIASTOLIC BLOOD PRESSURE: 81 MMHG | RESPIRATION RATE: 16 BRPM

## 2021-01-21 DIAGNOSIS — M53.3 SACROILIAC JOINT DYSFUNCTION: ICD-10-CM

## 2021-01-21 DIAGNOSIS — M51.36 DDD (DEGENERATIVE DISC DISEASE), LUMBAR: Primary | ICD-10-CM

## 2021-01-21 DIAGNOSIS — M50.30 DDD (DEGENERATIVE DISC DISEASE), CERVICAL: ICD-10-CM

## 2021-01-21 DIAGNOSIS — M47.812 CERVICAL SPONDYLOSIS: ICD-10-CM

## 2021-01-21 DIAGNOSIS — M47.816 LUMBAR SPONDYLOSIS: ICD-10-CM

## 2021-01-21 DIAGNOSIS — G89.4 CHRONIC PAIN SYNDROME: ICD-10-CM

## 2021-01-21 PROCEDURE — 99213 OFFICE O/P EST LOW 20 MIN: CPT | Performed by: STUDENT IN AN ORGANIZED HEALTH CARE EDUCATION/TRAINING PROGRAM

## 2021-01-21 RX ORDER — HYDROCODONE BITARTRATE AND ACETAMINOPHEN 5; 325 MG/1; MG/1
1 TABLET ORAL 2 TIMES DAILY PRN
Qty: 60 TABLET | Refills: 0 | Status: SHIPPED | OUTPATIENT
Start: 2021-01-25 | End: 2021-02-24
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
## 2021-01-22 DIAGNOSIS — F41.9 ANXIETY: ICD-10-CM

## 2021-01-22 RX ORDER — CLONAZEPAM 0.5 MG/1
0.5 TABLET ORAL 2 TIMES DAILY PRN
Qty: 60 TABLET | Refills: 2 | OUTPATIENT
Start: 2021-01-22

## 2021-01-25 DIAGNOSIS — F41.9 ANXIETY: ICD-10-CM

## 2021-01-25 RX ORDER — CLONAZEPAM 0.5 MG/1
0.5 TABLET ORAL 2 TIMES DAILY PRN
Qty: 60 TABLET | Refills: 2 | OUTPATIENT
Start: 2021-01-25

## 2021-02-05 RX ORDER — LEVOTHYROXINE SODIUM 137 UG/1
TABLET ORAL
Qty: 30 TABLET | Refills: 0 | Status: SHIPPED | OUTPATIENT
Start: 2021-02-05 | End: 2021-03-30 | Stop reason: SDUPTHER

## 2021-02-11 DIAGNOSIS — F41.9 ANXIETY: ICD-10-CM

## 2021-02-11 RX ORDER — CLONAZEPAM 0.5 MG/1
0.5 TABLET ORAL 2 TIMES DAILY PRN
Qty: 60 TABLET | Refills: 2 | OUTPATIENT
Start: 2021-02-11

## 2021-02-11 NOTE — TELEPHONE ENCOUNTER
LOV 11/24/20  Next OV 2/24/21    Last refill 10/21/20 #60 with 2 refills. This medication was discontinued on 1/25/21. Pt is stating that she is still taking this medication. Please see message below from the HUB.

## 2021-02-11 NOTE — TELEPHONE ENCOUNTER
PATIENT CALLED TO REQUEST REFILL OF CLONAZEPAM, BUT IT IS NOT LISTED ON HER MED LIST. SHE SAID IT WAS PRESCRIBED BY TAI VELA AND SHE IS COMPLETLEY OUT OF MEDICATION. PLEASE ADVISE.     PATIENT CALL BACK: [phone]     PATIENT CONFIRMED PHARMACY:  [facility]/pharm[id number]  [place] - [address] - [phone]  - [phone] FX

## 2021-02-11 NOTE — TELEPHONE ENCOUNTER
"Please see my last note: I have discontinued clonazepam, she will no longer be receiving refills from me. I have referred her to psychiatry also for this.    She is also been referred to pain management    \"Bipolar d/o, symptoms improving on Vraylar, has not been using clonazepam, would like to discontinue so she can resume pain medication\"  "

## 2021-02-12 ENCOUNTER — DOCUMENTATION (OUTPATIENT)
Dept: PHYSICAL THERAPY | Facility: CLINIC | Age: 61
End: 2021-02-12

## 2021-02-12 NOTE — PROGRESS NOTES
Discharge Summary  Discharge Summary from Physical Therapy Report      Date of Initial PT visit: 12/10/2020  Number of Visits Seen: 4     Discharge Status of Patient: Pt was showing some improvement in therapy with less pain.  Pt has not returned since 12/21/2021    Goals: Partially Met    Discharge Plan: Continue with current home exercise program as instructed    Date of Discharge: 02/12/2021      Gwen Denis, PT, DPT  Physical Therapist

## 2021-02-23 RX ORDER — GABAPENTIN 600 MG/1
TABLET ORAL
Qty: 90 TABLET | Refills: 0 | Status: SHIPPED | OUTPATIENT
Start: 2021-02-23 | End: 2021-03-26 | Stop reason: SDUPTHER

## 2021-02-26 ENCOUNTER — OFFICE VISIT (OUTPATIENT)
Dept: PSYCHIATRY | Facility: CLINIC | Age: 61
End: 2021-02-26

## 2021-02-26 DIAGNOSIS — F51.01 PRIMARY INSOMNIA: Chronic | ICD-10-CM

## 2021-02-26 DIAGNOSIS — F31.32 BIPOLAR 1 DISORDER, DEPRESSED, MODERATE (HCC): Primary | Chronic | ICD-10-CM

## 2021-02-26 DIAGNOSIS — F41.1 GENERALIZED ANXIETY DISORDER: Chronic | ICD-10-CM

## 2021-02-26 PROCEDURE — 90792 PSYCH DIAG EVAL W/MED SRVCS: CPT | Performed by: PSYCHIATRY & NEUROLOGY

## 2021-02-26 RX ORDER — HYDROXYZINE HYDROCHLORIDE 25 MG/1
25 TABLET, FILM COATED ORAL 2 TIMES DAILY PRN
Qty: 60 TABLET | Refills: 2 | Status: SHIPPED | OUTPATIENT
Start: 2021-02-26 | End: 2021-08-18 | Stop reason: SDUPTHER
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
## 2021-02-26 NOTE — PATIENT INSTRUCTIONS

## 2021-03-25 RX ORDER — GABAPENTIN 600 MG/1
TABLET ORAL
Qty: 90 TABLET | Refills: 0 | OUTPATIENT
Start: 2021-03-25

## 2021-03-26 ENCOUNTER — OFFICE VISIT (OUTPATIENT)
Dept: FAMILY MEDICINE CLINIC | Facility: CLINIC | Age: 61
End: 2021-03-26

## 2021-03-26 VITALS
DIASTOLIC BLOOD PRESSURE: 64 MMHG | OXYGEN SATURATION: 98 % | WEIGHT: 100 LBS | TEMPERATURE: 97.7 F | SYSTOLIC BLOOD PRESSURE: 118 MMHG | HEART RATE: 78 BPM | HEIGHT: 64 IN | BODY MASS INDEX: 17.07 KG/M2

## 2021-03-26 DIAGNOSIS — E03.9 ACQUIRED HYPOTHYROIDISM: ICD-10-CM

## 2021-03-26 DIAGNOSIS — G89.29 OTHER CHRONIC PAIN: ICD-10-CM

## 2021-03-26 DIAGNOSIS — Z00.00 PREVENTATIVE HEALTH CARE: Primary | ICD-10-CM

## 2021-03-26 PROCEDURE — 99214 OFFICE O/P EST MOD 30 MIN: CPT | Performed by: NURSE PRACTITIONER

## 2021-03-26 PROCEDURE — 86803 HEPATITIS C AB TEST: CPT | Performed by: NURSE PRACTITIONER

## 2021-03-26 PROCEDURE — 84443 ASSAY THYROID STIM HORMONE: CPT | Performed by: NURSE PRACTITIONER

## 2021-03-26 RX ORDER — GABAPENTIN 600 MG/1
600 TABLET ORAL 3 TIMES DAILY
Qty: 90 TABLET | Refills: 2 | Status: SHIPPED | OUTPATIENT
Start: 2021-03-26 | End: 2021-10-14

## 2021-03-26 NOTE — ASSESSMENT & PLAN NOTE
1.  Continue gabapentin as prescribed, refill sent to pharmacy  2.  Continue oral and topical diclofenac  3.  Patient to call if she decides she wants referral to pain management

## 2021-03-26 NOTE — PROGRESS NOTES
"Chief Complaint  Follow-up (follow up for refills on gabapentin)    Subjective          Michell Mckeon presents to [facility]  History of Present Illness    Patient has a history of rheumatoid arthritis with chronic pain in multiple joints as well as cervical spine radiates into her right shoulder.  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  Patient is taking oral diclofenac, using diclofenac gel as needed as well as gabapentin.  She was referred to pain management and they were giving her Norco to be taken as needed, however patient was discharged related to an abnormal drug screen.  Patient does not want referral for alternate pain management clinic.  She states, \"I am good with the medications I am taking.\"    Patient has chronic hypothyroidism and is taking Synthroid as prescribed.  TSH in October was 7.6.  She was to return for follow-up labs but until now, has not done so.    PHQ-9 Depression Screening  Little interest or pleasure in doing things? 1   Feeling down, depressed, or hopeless? 0   Trouble falling or staying asleep, or sleeping too much? 0   Feeling tired or having little energy? 1   Poor appetite or overeating? 1   Feeling bad about yourself - or that you are a failure or have let yourself or your family down? 0   Trouble concentrating on things, such as reading the newspaper or watching television? 1   Moving or speaking so slowly that other people could have noticed? Or the opposite - being so fidgety or restless that you have been moving around a lot more than usual? 0   Thoughts that you would be better off dead, or of hurting yourself in some way? 0   PHQ-9 Total Score 4   If you checked off any problems, how difficult have these problems made it for you to do your work, take care of things at home, or get along with other people? Somewhat difficult     Objective   Vital Signs:   /64 (BP Location: Left arm, Patient " "Position: Sitting, Cuff Size: Adult)   Pulse 78   Temp 97.7 °F (36.5 °C) (Skin)   Ht 162.6 cm (64\")   Wt 45.4 kg (100 lb)   SpO2 98%   BMI 17.16 kg/m²     Physical Exam  Constitutional:       Appearance: Normal appearance.   HENT:      Head: Normocephalic.   Cardiovascular:      Rate and Rhythm: Normal rate and regular rhythm.   Pulmonary:      Effort: Pulmonary effort is normal.      Breath sounds: Normal breath sounds.   Abdominal:      General: Abdomen is flat. Bowel sounds are normal.      Palpations: Abdomen is soft.   Musculoskeletal:         General: Normal range of motion.      Cervical back: Neck supple.      Right lower leg: No edema.      Left lower leg: No edema.   Skin:     General: Skin is warm and dry.   Neurological:      Mental Status: She is alert and oriented to person, place, and time.      Gait: Gait is intact.   Psychiatric:         Attention and Perception: Attention normal.         Mood and Affect: Mood normal.         Speech: Speech normal.        Result Review :     TSH    TSH 7/1/20 8/13/20 10/21/20   TSH 22.600 (A) 7.730 (A) 7.600 (A)   (A) Abnormal value                      Assessment and Plan    Diagnoses and all orders for this visit:    1. Preventative health care (Primary)  Assessment & Plan:  1.  Prevnar 13 ordered  2.  Hep C screening    Orders:  -     pneumococcal conj. 13-valent (PREVNAR-13) vaccine 0.5 mL  -     Hepatitis C antibody    2. Acquired hypothyroidism  Assessment & Plan:  1.  Continue Synthroid as prescribed  2.  Recheck TSH today    Orders:  -     TSH    3. Other chronic pain  Assessment & Plan:  1.  Continue gabapentin as prescribed, refill sent to pharmacy  2.  Continue oral and topical diclofenac  3.  Patient to call if she decides she wants referral to pain management      Other orders  -     gabapentin (NEURONTIN) 600 MG tablet; Take 1 tablet by mouth 3 (Three) Times a Day.  Dispense: 90 tablet; Refill: 2    I spent 30 minutes caring for Michell on this " date of service. This time includes time spent by me in the following activities:preparing for the visit, reviewing tests, obtaining and/or reviewing a separately obtained history, performing a medically appropriate examination and/or evaluation , counseling and educating the patient/family/caregiver, ordering medications, tests, or procedures and documenting information in the medical record  Follow Up   Return in about 4 months (around 7/26/2021).  Patient was given instructions and counseling regarding her condition or for health maintenance advice. Please see specific information pulled into the AVS if appropriate.

## 2021-03-27 LAB
HCV AB SER DONR QL: NORMAL
TSH SERPL DL<=0.05 MIU/L-ACNC: 1.94 UIU/ML (ref 0.27–4.2)

## 2021-03-30 RX ORDER — LEVOTHYROXINE SODIUM 137 UG/1
137 TABLET ORAL DAILY
Qty: 90 TABLET | Refills: 1 | Status: SHIPPED | OUTPATIENT
Start: 2021-03-30 | End: 2021-08-19

## 2021-04-12 DIAGNOSIS — G47.09 OTHER INSOMNIA: ICD-10-CM

## 2021-04-14 RX ORDER — TRAZODONE HYDROCHLORIDE 300 MG/1
300 TABLET ORAL NIGHTLY PRN
Qty: 90 TABLET | Refills: 1 | Status: SHIPPED | OUTPATIENT
Start: 2021-04-14 | End: 2021-10-14 | Stop reason: SDUPTHER

## 2021-05-25 ENCOUNTER — TELEPHONE (OUTPATIENT)
Dept: FAMILY MEDICINE CLINIC | Facility: CLINIC | Age: 61
End: 2021-05-25

## 2021-05-25 RX ORDER — FLUCONAZOLE 150 MG/1
150 TABLET ORAL DAILY
Qty: 5 TABLET | Refills: 0 | Status: SHIPPED | OUTPATIENT
Start: 2021-05-25 | End: 2021-05-30

## 2021-05-25 NOTE — TELEPHONE ENCOUNTER
Caller: Michell Mckeon    Relationship: Self    Best call back number: [phone]    What medication are you requesting: DIFLUCAN    What are your current symptoms: WHITE TONGUE WITH TONGUE SORES. PATIENT STATES IT IS THRUSH AS SHE ALWAYS HAS IT.    How long have you been experiencing symptoms: 4 DAYS    Have you had these symptoms before:    [x] Yes  [] No    Have you been treated for these symptoms before:   [x] Yes  [] No    If a prescription is needed, what is your preferred pharmacy and phone number: [facility]/PHARMACY #[id number] - [place] - [address] - [phone]  - [phone]     Additional notes: PATIENT STATES [facility]STATIN IS NOT HELPING AND SHE THINKS SHE NEEDS TO BE ON DIFLUCAN FOR 5-7 DAYS BECAUSE THE ONE DOSAGE IS NOT GETTING RID OF IT.

## 2021-06-07 RX ORDER — ACYCLOVIR 400 MG/1
TABLET ORAL
Qty: 50 TABLET | Refills: 0 | Status: SHIPPED | OUTPATIENT
Start: 2021-06-07 | End: 2021-06-22 | Stop reason: SDUPTHER

## 2021-06-09 DIAGNOSIS — M47.23 OSTEOARTHRITIS OF SPINE WITH RADICULOPATHY, CERVICOTHORACIC REGION: ICD-10-CM

## 2021-06-09 DIAGNOSIS — M79.641 PAIN IN BOTH HANDS: ICD-10-CM

## 2021-06-09 DIAGNOSIS — M79.642 PAIN IN BOTH HANDS: ICD-10-CM

## 2021-06-10 DIAGNOSIS — M79.641 PAIN IN BOTH HANDS: ICD-10-CM

## 2021-06-10 DIAGNOSIS — M79.642 PAIN IN BOTH HANDS: ICD-10-CM

## 2021-06-10 DIAGNOSIS — M47.23 OSTEOARTHRITIS OF SPINE WITH RADICULOPATHY, CERVICOTHORACIC REGION: ICD-10-CM

## 2021-06-10 NOTE — TELEPHONE ENCOUNTER
Caller: Michell Mckeon    Relationship: Self    Best call back [phone]    Medication needed:   Requested Prescriptions     Pending Prescriptions Disp Refills   • Diclofenac Sodium (VOLTAREN) 1 % gel gel       Sig: Apply  topically to the appropriate area as directed 4 (Four) Times a Day As Needed.       When do you need the refill by: ASAP    What additional details did the patient provide when requesting the medication: PATIENT IS OUT OF CREAM    Does the patient have less than a 3 day supply:  [x] Yes  [] No    What is the patient's preferred pharmacy: [facility]/PHARMACY #[id number] [place], [place] - [address] - [phone]  - [phone] FX

## 2021-06-22 ENCOUNTER — TELEPHONE (OUTPATIENT)
Dept: FAMILY MEDICINE CLINIC | Facility: CLINIC | Age: 61
End: 2021-06-22

## 2021-06-22 RX ORDER — ACYCLOVIR 400 MG/1
TABLET ORAL
Qty: 50 TABLET | Refills: 0 | Status: SHIPPED | OUTPATIENT
Start: 2021-06-22 | End: 2021-07-15 | Stop reason: SDUPTHER

## 2021-06-22 NOTE — TELEPHONE ENCOUNTER
Caller: Michell Mckeon    Relationship: Self    Best call back number: [phone]    Medication needed:   Requested Prescriptions     Pending Prescriptions Disp Refills   • acyclovir (ZOVIRAX) 400 MG tablet 50 tablet 0     Sig: Take 1 by mouth 5 times daily for 5 days       When do you need the refill by: ASAP    What additional details did the patient provide when requesting the medication: PATIENT HAS USED ALL OF THE LAST ROUND AND SHE IS STILL BREAKING OUT AROUND HER NOSE. SHE WOULD LIKE TO HAVE REFILLS AVAILABLE IF POSSIBLE.    Does the patient have less than a 3 day supply:  [x] Yes  [] No    What is the patient's preferred pharmacy: [facility]/PHARMACY #[id number] - [place] - [address] - [phone]  - [phone] FX

## 2021-07-15 RX ORDER — ACYCLOVIR 400 MG/1
TABLET ORAL
Qty: 50 TABLET | Refills: 0 | Status: SHIPPED | OUTPATIENT
Start: 2021-07-15 | End: 2021-08-09 | Stop reason: SDUPTHER

## 2021-07-15 NOTE — TELEPHONE ENCOUNTER
"Pt reports that she believes she's breaking out due to stress, and reports that it is a worse break out than usual.  She reports it it is \"all over her face\".  She asked if there is a daily maintenance pill she can take to help along with the acyclovir. "

## 2021-07-15 NOTE — TELEPHONE ENCOUNTER
Caller: Michell Mckeon    Relationship: Self    Best call back number:     Medication needed:   Requested Prescriptions     Pending Prescriptions Disp Refills   • acyclovir (ZOVIRAX) 400 MG tablet 50 tablet 0     Sig: Take 1 by mouth 5 times daily for 5 days       When do you need the refill by:     What additional details did the patient provide when requesting the medication: PATIENT WANTS TO BE CALLED BY DR VELA OR STAFF TO DISCUSS THIS MEDICATION AND AN ALTERNATIVE TO TAKE ON A DAILY BASIS    Does the patient have less than a 3 day supply:  [x] Yes  [] No    What is the patient's preferred pharmacy:  [facility] [address]     [phone]

## 2021-08-09 ENCOUNTER — TELEPHONE (OUTPATIENT)
Dept: FAMILY MEDICINE CLINIC | Facility: CLINIC | Age: 61
End: 2021-08-09

## 2021-08-09 RX ORDER — NAPROXEN 500 MG/1
500 TABLET ORAL DAILY PRN
Qty: 30 TABLET | Refills: 5 | Status: SHIPPED | OUTPATIENT
Start: 2021-08-09 | End: 2022-02-07

## 2021-08-09 RX ORDER — ACYCLOVIR 400 MG/1
TABLET ORAL
Qty: 50 TABLET | Refills: 2 | Status: SHIPPED | OUTPATIENT
Start: 2021-08-09 | End: 2022-07-29

## 2021-08-09 NOTE — TELEPHONE ENCOUNTER
Pt is not on naproxen, what medication she requesting?  And does she currently have a breakout or just need acyclovir once daily?

## 2021-08-09 NOTE — TELEPHONE ENCOUNTER
Caller: Michell Mckeon    Relationship: Self    Best call back number:[phone] (M)  Medication needed:   Requested Prescriptions     Pending Prescriptions Disp Refills   • acyclovir (ZOVIRAX) 400 MG tablet 50 tablet 0     Sig: Take 1 by mouth 5 times daily for 5 days then 1 p.o. daily   NAPROXEN 500MG (NOT ON MEDICATION REFILL)    When do you need the refill by: AS SOON AS POSSIBLE    What additional details did the patient provide when requesting the medication: PATIENT IS OUT OF THE NAPROXEN, AND HAS 10 OF THE acyclovir (ZOVIRAX) 400 MG tablet    Does the patient have less than a 3 day supply:  [x] Yes  [] No    What is the patient's preferred pharmacy:    [facility]/pharmacy #[id number] - [place], [place] - [address] - [phone] [facility] [phone]   [phone]

## 2021-08-09 NOTE — TELEPHONE ENCOUNTER
Pt stated that she does take Naproxen, but it looks like her old provider was sent the refill and they did refill it. She said that Dr. Farrell refilled it in May and she is not sure why they refilled it because it should have been sent to you for a refill. She said you must not have filled it since she started seeing you, but she takes this for her pain. Also, she is taking the acyclovir daily because she still has an outbreak, but it is clearing up. She thinks she only needs another month of treatment and she should be good.

## 2021-08-18 ENCOUNTER — TELEPHONE (OUTPATIENT)
Dept: FAMILY MEDICINE CLINIC | Facility: CLINIC | Age: 61
End: 2021-08-18

## 2021-08-18 RX ORDER — HYDROXYZINE HYDROCHLORIDE 25 MG/1
25 TABLET, FILM COATED ORAL 2 TIMES DAILY PRN
Qty: 60 TABLET | Refills: 2 | Status: SHIPPED | OUTPATIENT
Start: 2021-08-18 | End: 2021-10-14

## 2021-08-18 NOTE — TELEPHONE ENCOUNTER
"Hub ok to share:    \"please schedule appt for tomorrow.  Elise sent hydroxyzine for anxiety for now.\"  "

## 2021-08-18 NOTE — TELEPHONE ENCOUNTER
Pt called today to report that Jamil passed away and she was very distraught.  She reports she is having panic attacks and asked for something that will help.  She reports the clonazepam did not help her before.

## 2021-08-19 ENCOUNTER — OFFICE VISIT (OUTPATIENT)
Dept: FAMILY MEDICINE CLINIC | Facility: CLINIC | Age: 61
End: 2021-08-19

## 2021-08-19 VITALS
DIASTOLIC BLOOD PRESSURE: 80 MMHG | TEMPERATURE: 98.4 F | BODY MASS INDEX: 16.39 KG/M2 | OXYGEN SATURATION: 98 % | HEART RATE: 105 BPM | WEIGHT: 96 LBS | HEIGHT: 64 IN | SYSTOLIC BLOOD PRESSURE: 120 MMHG

## 2021-08-19 DIAGNOSIS — F43.21 GRIEVING: ICD-10-CM

## 2021-08-19 DIAGNOSIS — F41.9 ANXIETY: ICD-10-CM

## 2021-08-19 DIAGNOSIS — E03.9 ACQUIRED HYPOTHYROIDISM: ICD-10-CM

## 2021-08-19 DIAGNOSIS — F31.4 BIPOLAR DISORDER, CURRENT EPISODE DEPRESSED, SEVERE, WITHOUT PSYCHOTIC FEATURES (HCC): Primary | ICD-10-CM

## 2021-08-19 DIAGNOSIS — G47.09 OTHER INSOMNIA: ICD-10-CM

## 2021-08-19 PROCEDURE — 99214 OFFICE O/P EST MOD 30 MIN: CPT | Performed by: NURSE PRACTITIONER

## 2021-08-19 RX ORDER — LEVOTHYROXINE SODIUM 137 UG/1
137 TABLET ORAL DAILY
Qty: 1 TABLET | Refills: 0
Start: 2021-08-19 | End: 2021-10-14 | Stop reason: SDUPTHER

## 2021-08-19 RX ORDER — CLONAZEPAM 0.5 MG/1
0.5 TABLET ORAL 2 TIMES DAILY PRN
Qty: 45 TABLET | Refills: 0 | Status: SHIPPED | OUTPATIENT
Start: 2021-08-19 | End: 2021-09-08 | Stop reason: SDUPTHER

## 2021-08-19 RX ORDER — CARIPRAZINE 6 MG/1
1 CAPSULE, GELATIN COATED ORAL DAILY
Qty: 1 CAPSULE | Refills: 0
Start: 2021-08-19 | End: 2021-10-14 | Stop reason: SDUPTHER

## 2021-08-19 NOTE — PROGRESS NOTES
"Chief Complaint  Anxiety (Jamil passed away a few days ago and really struggling with anxiety, depression and greif)    Subjective          Michell Mckeon presents to [facility]  History of Present Illness     Anxiety associated with grieving, worry about finances etc. pt stopped taking most meds when caring for her friend d/t \"giving all of her time to him\" hydroxyzine is ineffective. She took Klonopin in the past which she also reports was minimally effective.     Stopped taking most meds, using naproxen, muscle relaxer prn for back pain, no longer following with pain mgmt    Hypothyroidism, patient stopped taking levothyroxine, she reports hair loss, fatigue.  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         Objective   Vital Signs:   /80 (BP Location: Left arm, Patient Position: Sitting, Cuff Size: Adult)   Pulse 105   Temp 98.4 °F (36.9 °C) (Skin)   Ht 162.6 cm (64\")   Wt 43.5 kg (96 lb)   SpO2 98%   BMI 16.48 kg/m²       Physical Exam  Vitals and nursing note reviewed.   Constitutional:       General: She is not in acute distress.     Appearance: She is well-developed. She is not diaphoretic.   Eyes:      Pupils: Pupils are equal, round, and reactive to light.   Neck:      Thyroid: No thyromegaly.      Vascular: No JVD.   Cardiovascular:      Rate and Rhythm: Normal rate and regular rhythm.      Heart sounds: Normal heart sounds. No murmur heard.     Pulmonary:      Effort: Pulmonary effort is normal. No respiratory distress.      Breath sounds: Normal breath sounds.   Abdominal:      General: Bowel sounds are normal. There is no distension.      Palpations: Abdomen is soft.      Tenderness: There is no abdominal " tenderness.   Musculoskeletal:         General: No tenderness. Normal range of motion.      Cervical back: Normal range of motion and neck supple.   Skin:     General: Skin is warm and dry.      Findings: Rash (facial cold sores, herpetic, resolving ) present.   Neurological:      Mental Status: She is alert and oriented to person, place, and time.      Sensory: No sensory deficit.   Psychiatric:         Attention and Perception: Attention normal.         Mood and Affect: Mood is anxious and depressed. Affect is labile and tearful.         Speech: Speech normal.         Behavior: Behavior normal. Behavior is cooperative.         Thought Content: Thought content normal.         Judgment: Judgment normal.          Result Review :                 Assessment and Plan    Diagnoses and all orders for this visit:    1. Bipolar disorder, current episode depressed, severe, without psychotic features (CMS/HCC) (Primary)  Comments:  resume vraylar. pt has supply at home  Orders:  -     clonazePAM (KlonoPIN) 0.5 MG tablet; Take 1 tablet by mouth 2 (Two) Times a Day As Needed for Anxiety for up to 28 days.  Dispense: 45 tablet; Refill: 0    2. Anxiety  Comments:  ok for prn klonopin through this month for grieving and stress, no more than 1-2/day  Orders:  -     clonazePAM (KlonoPIN) 0.5 MG tablet; Take 1 tablet by mouth 2 (Two) Times a Day As Needed for Anxiety for up to 28 days.  Dispense: 45 tablet; Refill: 0    3. Grieving  Comments:  discussed grief and rec support group, notify me if has any problems.  Patient given grief education handout and discussed stages to expect    4. Other insomnia  Comments:  rec cont trazodone for sleep    5. Acquired hypothyroidism  Comments:  Resume levothyroxine, will recheck TSH in 4 weeks at follow-up appointment    Other orders  -     levothyroxine (SYNTHROID, LEVOTHROID) 137 MCG tablet; Take 1 tablet by mouth Daily.  Dispense: 1 tablet; Refill: 0  -     Cariprazine HCl (Vraylar) 6 MG  capsule; Take 1 capsule by mouth Daily.  Dispense: 1 capsule; Refill: 0      I spent 30 minutes caring for Michell on this date of service. This time includes time spent by me in the following activities:preparing for the visit, reviewing tests, counseling and educating the patient/family/caregiver, ordering medications, tests, or procedures and documenting information in the medical record     Follow Up   Return in about 4 weeks (around 9/16/2021) for anxiety/grief, will need TSH at this apt.  Patient was given instructions and counseling regarding her condition or for health maintenance advice. Please see specific information pulled into the AVS if appropriate.

## 2021-08-19 NOTE — PATIENT INSTRUCTIONS
Complicated Grief  Grief is a normal response to the death of someone close to you. Feelings of fear, anger, and guilt can affect almost everyone who loses a loved one. It is also common to have symptoms of depression while you are grieving. These include problems with sleep, loss of appetite, and lack of energy. They may last for weeks or months after a loss.  Complicated grief is different from normal grief or depression. Normal grieving involves sadness and feelings of loss, but those feelings get better and heal over time. Complicated grief is a severe type of grief that lasts for a long time, usually for several months to a year or longer. It interferes with your ability to function normally. Complicated grief may require treatment from a mental health care provider.  What are the causes?  The cause of this condition is not known. It is not clear why some people continue to struggle with grief and others do not.  What increases the risk?  You are more likely to develop this condition if:  · The death of your loved one was sudden or unexpected.  · The death of your loved one was due to a violent event.  · Your loved one  from suicide.  · Your loved one was a child or a young person.  · You were very close to your loved one, or you were dependent on him or her.  · You have a history of depression or anxiety.  What are the signs or symptoms?  Symptoms of this condition include:  · Feeling disbelief or having a lack of emotion (numbness).  · Being unable to enjoy good memories of your loved one.  · Needing to avoid anything or anyone that reminds you of your loved one.  · Being unable to stop thinking about the death.  · Feeling intense anger or guilt.  · Feeling alone and hopeless.  · Feeling that your life is meaningless and empty.  · Losing the desire to move on with your life.  How is this diagnosed?  This condition may be diagnosed based on:  · Your symptoms. Complicated grief will be diagnosed if you have  ongoing symptoms of grief for 6-12 months or longer.  · The effect of symptoms on your life. You may be diagnosed with this condition if your symptoms are interfering with your ability to live your life.  Your health care provider may recommend that you see a mental health care provider. Many symptoms of depression are similar to the symptoms of complicated grief. It is important to be evaluated for complicated grief along with other mental health conditions.  How is this treated?  This condition is most commonly treated with talk therapy. This therapy is offered by a mental health specialist (psychiatrist). During therapy:  · You will learn healthy ways to cope with the loss of your loved one.  · Your mental health care provider may recommend antidepressant medicines.  Follow these instructions at home:  Lifestyle    · Take care of yourself.  ? Eat on a regular basis, and maintain a healthy diet. Eat plenty of fruits, vegetables, lean protein, and whole grains.  ? Try to get some exercise each day. Aim for 30 minutes of exercise on most days of the week.  ? Keep a consistent sleep schedule. Try to get 8 or more hours of sleep each night.  ? Start doing the things that you used to enjoy.  · Do not use drugs or alcohol to ease your symptoms.  · Spend time with friends and loved ones.  General instructions  · Take over-the-counter and prescription medicines only as told by your health care provider.  · Consider joining a grief (bereavement) support group to help you deal with your loss.  · Keep all follow-up visits as told by your health care provider. This is important.  Contact a health care provider if:  · Your symptoms prevent you from functioning normally.  · Your symptoms do not get better with treatment.  Get help right away if:  · You have serious thoughts about hurting yourself or someone else.  · You have suicidal feelings.  If you ever feel like you may hurt yourself or others, or have thoughts about taking  your own life, get help right away. You can go to your nearest emergency department or call:  · Your local emergency services (911 in the [place]  · A suicide crisis helpline, such as the National Suicide Prevention Lifeline at 1-203.138.3382. This is open 24 hours a day.  Summary  · Complicated grief is a severe type of grief that lasts for a long time. This grief is not likely to go away on its own. Get the help you need.  · Some griefs are more difficult than others and can cause this condition. You may need a certain type of treatment to help you recover if the loss of your loved one was sudden, violent, or due to suicide.  · You may feel guilty about moving on with your life. Getting help does not mean that you are forgetting your loved one. It means that you are taking care of yourself.  · Complicated grief is best treated with talk therapy. Medicines may also be prescribed.  · Seek the help you need, and find support that will help you recover.  This information is not intended to replace advice given to you by your health care provider. Make sure you discuss any questions you have with your health care provider.  Document Revised: 11/30/2018 Document Reviewed: 10/03/2018  Elsevier Patient Education © 2021 [employer]

## 2021-09-07 ENCOUNTER — TELEPHONE (OUTPATIENT)
Dept: FAMILY MEDICINE CLINIC | Facility: CLINIC | Age: 61
End: 2021-09-07

## 2021-09-07 NOTE — TELEPHONE ENCOUNTER
Caller: Michell Mckeon    Relationship: Self    Best call back number: [phone] (H)    What is the best time to reach you: ANYTIME    Who are you requesting to speak with (clinical staff, provider,  specific staff member): MARY GRACE    What was the call regarding: PATIENT STATES WOULD LIKE T SPEAK TO MARY GRACE ABOUT MEDICATION    Do you require a callback: YES

## 2021-09-07 NOTE — TELEPHONE ENCOUNTER
Pt reports that she is severely grinding her teeth again due to severe stress.  She reports the clonazepam and hydroxyzine isn't helping.  She is asking just for a temporary rx.

## 2021-09-08 DIAGNOSIS — F41.9 ANXIETY: ICD-10-CM

## 2021-09-08 DIAGNOSIS — F31.4 BIPOLAR DISORDER, CURRENT EPISODE DEPRESSED, SEVERE, WITHOUT PSYCHOTIC FEATURES (HCC): ICD-10-CM

## 2021-09-08 RX ORDER — CLONAZEPAM 1 MG/1
1 TABLET ORAL 2 TIMES DAILY PRN
Qty: 45 TABLET | Refills: 0 | Status: SHIPPED | OUTPATIENT
Start: 2021-09-08 | End: 2021-10-14

## 2021-09-08 NOTE — TELEPHONE ENCOUNTER
I sent the refill request to you.  Pt asked if you would be willing to increase the mg of her clonazepam.

## 2021-10-11 ENCOUNTER — TELEPHONE (OUTPATIENT)
Dept: FAMILY MEDICINE CLINIC | Facility: CLINIC | Age: 61
End: 2021-10-11

## 2021-10-11 RX ORDER — ROPINIROLE 0.5 MG/1
0.5 TABLET, FILM COATED ORAL NIGHTLY
Qty: 30 TABLET | Refills: 0 | Status: SHIPPED | OUTPATIENT
Start: 2021-10-11 | End: 2021-11-02

## 2021-10-11 NOTE — TELEPHONE ENCOUNTER
Pt reports that she is expc restless legs and asked if you could squeeze her in for a visit this week to discuss.  You have 17 pts everyday this week.  Ok to overbook you?  Pt prefers visit tomorrow.

## 2021-10-11 NOTE — TELEPHONE ENCOUNTER
Have pt try requip nightly for 2 weeks, I will send rx. Schedule appt for follow up on RLS and as mentioned in last note, patient was supposed to have a follow-up around 9/16/2021.

## 2021-10-14 ENCOUNTER — OFFICE VISIT (OUTPATIENT)
Dept: FAMILY MEDICINE CLINIC | Facility: CLINIC | Age: 61
End: 2021-10-14

## 2021-10-14 ENCOUNTER — TELEPHONE (OUTPATIENT)
Dept: FAMILY MEDICINE CLINIC | Facility: CLINIC | Age: 61
End: 2021-10-14

## 2021-10-14 VITALS
WEIGHT: 101.2 LBS | DIASTOLIC BLOOD PRESSURE: 76 MMHG | BODY MASS INDEX: 17.28 KG/M2 | SYSTOLIC BLOOD PRESSURE: 126 MMHG | OXYGEN SATURATION: 98 % | HEART RATE: 93 BPM | TEMPERATURE: 99.6 F | HEIGHT: 64 IN

## 2021-10-14 DIAGNOSIS — F31.4 BIPOLAR DISORDER, CURRENT EPISODE DEPRESSED, SEVERE, WITHOUT PSYCHOTIC FEATURES (HCC): ICD-10-CM

## 2021-10-14 DIAGNOSIS — G47.09 OTHER INSOMNIA: ICD-10-CM

## 2021-10-14 DIAGNOSIS — F41.9 ANXIETY: ICD-10-CM

## 2021-10-14 DIAGNOSIS — E03.9 ACQUIRED HYPOTHYROIDISM: ICD-10-CM

## 2021-10-14 DIAGNOSIS — Z23 NEED FOR INFLUENZA VACCINATION: ICD-10-CM

## 2021-10-14 DIAGNOSIS — G25.81 RLS (RESTLESS LEGS SYNDROME): Primary | ICD-10-CM

## 2021-10-14 PROCEDURE — 90471 IMMUNIZATION ADMIN: CPT | Performed by: NURSE PRACTITIONER

## 2021-10-14 PROCEDURE — 99214 OFFICE O/P EST MOD 30 MIN: CPT | Performed by: NURSE PRACTITIONER

## 2021-10-14 PROCEDURE — 90686 IIV4 VACC NO PRSV 0.5 ML IM: CPT | Performed by: NURSE PRACTITIONER

## 2021-10-14 RX ORDER — TRAZODONE HYDROCHLORIDE 300 MG/1
300 TABLET ORAL NIGHTLY PRN
Qty: 90 TABLET | Refills: 1 | Status: SHIPPED | OUTPATIENT
Start: 2021-10-14 | End: 2022-06-10

## 2021-10-14 RX ORDER — DIAZEPAM 5 MG/1
TABLET ORAL
Qty: 40 TABLET | Refills: 0 | Status: SHIPPED | OUTPATIENT
Start: 2021-10-14 | End: 2021-11-15 | Stop reason: SDUPTHER

## 2021-10-14 RX ORDER — CARIPRAZINE 6 MG/1
1 CAPSULE, GELATIN COATED ORAL DAILY
Qty: 90 CAPSULE | Refills: 1
Start: 2021-10-14 | End: 2022-05-09

## 2021-10-14 RX ORDER — LEVOTHYROXINE SODIUM 137 UG/1
137 TABLET ORAL DAILY
Qty: 90 TABLET | Refills: 1
Start: 2021-10-14 | End: 2022-06-20

## 2021-10-14 RX ORDER — ATORVASTATIN CALCIUM 20 MG/1
TABLET, FILM COATED ORAL
COMMUNITY
Start: 2021-09-18 | End: 2021-12-13

## 2021-10-14 RX ORDER — BUSPIRONE HYDROCHLORIDE 15 MG/1
15 TABLET ORAL 2 TIMES DAILY
Qty: 60 TABLET | Refills: 2 | Status: SHIPPED | OUTPATIENT
Start: 2021-10-14 | End: 2022-01-06

## 2021-10-14 NOTE — PROGRESS NOTES
"Chief Complaint  Anxiety and Restless Legs Syndrome (pt reports that she has used valium 10 mg bid before and it worked, so that is what she would like temporarily )    Subjective          Michell Mckeon presents to [facility] for   History of Present Illness     Anxiety, severe, patient states hydroxyzine and clonazepam are ineffective.  She is restless, her legs are jittering 24/7, she \"feels butterflies in her stomach\" all day long.  She is taking Vraylar as directed but is requesting for assistance to help relieve anxiety. She reports taking valium 10mg QID in past which was effective for anxiety.     Pt c/o thick exudate on tongue, has treated with nystatin but not improved.       The following portions of the patient's history were reviewed and updated as appropriate: allergies, current medications, past family history, past medical history, past social history, past surgical history and problem list.    Past Medical History:   Diagnosis Date   • Anemia    • Anxiety    • Arthritis    • Back pain    • Chronic pain    • DDD (degenerative disc disease), cervical    • Depression    • Excessive daytime sleepiness    • Generalized headaches    • Head injury    • High cholesterol    • IBS (irritable bowel syndrome)    • Insomnia    • Neck pain    • Obstructive sleep apnea    • Osteoarthritis    • Osteoarthritis    • Osteoporosis    • Restless leg syndrome    • Rheumatoid arthritis (HCC)    • Snoring    • Thyroid disease      Past Surgical History:   Procedure Laterality Date   • BREAST LUMPECTOMY Left     x3   • KNEE SURGERY     • TONSILLECTOMY       Family History   Problem Relation Age of Onset   • Arthritis Mother    • Heart disease Mother    • Hypertension Mother    • Bipolar disorder Son    • OCD Son      Social History     Tobacco Use   • Smoking status: Current Every Day Smoker     Packs/day: 1.00     Years: 40.00     Pack years: 40.00   • Smokeless tobacco: Never Used " "  Substance Use Topics   • Alcohol use: Yes       Current Outpatient Medications:   •  acyclovir (ZOVIRAX) 400 MG tablet, Take 1 by mouth 5 times daily for 5 days for breakout, otherwise take 1 p.o. daily, Disp: 50 tablet, Rfl: 2  •  atorvastatin (LIPITOR) 20 MG tablet, , Disp: , Rfl:   •  Cariprazine HCl (Vraylar) 6 MG capsule, Take 1 capsule by mouth Daily., Disp: 90 capsule, Rfl: 1  •  Diclofenac Sodium (VOLTAREN) 1 % gel gel, APPLY 4 G TOPICALLY TO THE APPROPRIATE AREA AS DIRECTED 4 (FOUR) TIMES A DAY AS NEEDED (PAIN)., Disp: 200 g, Rfl: 1  •  levothyroxine (SYNTHROID, LEVOTHROID) 137 MCG tablet, Take 1 tablet by mouth Daily., Disp: 90 tablet, Rfl: 1  •  methocarbamol (ROBAXIN) 750 MG tablet, TAKE 1 TABLET BY MOUTH THREE TIMES A DAY AS NEEDED FOR MUSCLE SPASMS, Disp: 60 tablet, Rfl: 2  •  naproxen (Naprosyn) 500 MG tablet, Take 1 tablet by mouth Daily As Needed for Moderate Pain ., Disp: 30 tablet, Rfl: 5  •  rOPINIRole (Requip) 0.5 MG tablet, Take 1 tablet by mouth Every Night. Take 1 hour before bedtime., Disp: 30 tablet, Rfl: 0  •  traZODone (DESYREL) 300 MG tablet, Take 1 tablet by mouth At Night As Needed for Sleep., Disp: 90 tablet, Rfl: 1  •  busPIRone (BUSPAR) 15 MG tablet, Take 1 tablet by mouth 2 (two) times a day., Disp: 60 tablet, Rfl: 2  •  diazePAM (Valium) 5 MG tablet, Take 1/2 to 1 tablet by mouth every 12 hours as needed for breakthrough panic/anxiety (30 day supply), Disp: 40 tablet, Rfl: 0    Objective   Vital Signs:   /76 (BP Location: Left arm, Patient Position: Sitting, Cuff Size: Small Adult)   Pulse 93   Temp 99.6 °F (37.6 °C) (Infrared)   Ht 162.6 cm (64.02\")   Wt 45.9 kg (101 lb 3.2 oz)   SpO2 98%   BMI 17.36 kg/m²       Physical Exam  Vitals and nursing note reviewed.   Constitutional:       General: She is not in acute distress.     Appearance: She is well-developed. She is not diaphoretic.   HENT:      Mouth/Throat:      Pharynx: Oropharyngeal exudate (Yellow/white/brown " exudate of the tongue only) present.   Eyes:      Pupils: Pupils are equal, round, and reactive to light.   Neck:      Thyroid: No thyromegaly.      Vascular: No JVD.   Cardiovascular:      Rate and Rhythm: Normal rate and regular rhythm.      Heart sounds: Normal heart sounds. No murmur heard.      Pulmonary:      Effort: Pulmonary effort is normal. No respiratory distress.      Breath sounds: Normal breath sounds.   Abdominal:      General: Bowel sounds are normal. There is no distension.      Palpations: Abdomen is soft.      Tenderness: There is no abdominal tenderness.   Musculoskeletal:         General: No tenderness. Normal range of motion.      Cervical back: Normal range of motion and neck supple.   Skin:     General: Skin is warm and dry.   Neurological:      Mental Status: She is alert and oriented to person, place, and time.      Sensory: No sensory deficit.   Psychiatric:         Attention and Perception: Attention normal.         Mood and Affect: Mood is anxious. Mood is not depressed.         Behavior: Behavior is hyperactive.         Thought Content: Thought content normal.         Judgment: Judgment normal.          Result Review :     No visits with results within 7 Day(s) from this visit.   Latest known visit with results is:   Office Visit on 03/26/2021   Component Date Value Ref Range Status   • Hepatitis C Ab 03/26/2021 Non-Reactive  Non-Reactive Final   • TSH 03/26/2021 1.940  0.270 - 4.200 uIU/mL Final                       Assessment and Plan    Diagnoses and all orders for this visit:    1. RLS (restless legs syndrome) (Primary)  Comments:  Continue Requip 0.5 mg nightly    2. Other insomnia  Comments:  Improved, continue trazodone 300 mg.  Orders:  -     traZODone (DESYREL) 300 MG tablet; Take 1 tablet by mouth At Night As Needed for Sleep.  Dispense: 90 tablet; Refill: 1    3. Bipolar disorder, current episode depressed, severe, without psychotic features (HCC)  Comments:  Continue  Vraylar 6 mg daily    4. Anxiety  Comments:  Severe, labile.  Discontinue hydroxyzine and clonazepam, start BuSpar twice daily, okay for Valium only for breakthrough anxiety/panic.   Orders:  -     diazePAM (Valium) 5 MG tablet; Take 1/2 to 1 tablet by mouth every 12 hours as needed for breakthrough panic/anxiety (30 day supply)  Dispense: 40 tablet; Refill: 0    5. Acquired hypothyroidism  Comments:  Refill levothyroxine    Other orders  -     busPIRone (BUSPAR) 15 MG tablet; Take 1 tablet by mouth 2 (two) times a day.  Dispense: 60 tablet; Refill: 2  -     Cariprazine HCl (Vraylar) 6 MG capsule; Take 1 capsule by mouth Daily.  Dispense: 90 capsule; Refill: 1  -     levothyroxine (SYNTHROID, LEVOTHROID) 137 MCG tablet; Take 1 tablet by mouth Daily.  Dispense: 90 tablet; Refill: 1    1.  Continue Requip 0.5 mg nightly  2.  Continue trazodone 300 mg  3.  Patient to call if buspirone is ineffective for titration  4.  Recommend short-term benzodiazepine only  5.  Patient is working, praised efforts  6.  Flu shot today  7.  Recommend patient brush her tongue while brushing teeth, exudate is not Candidal    I spent 30 minutes caring for Michell Mckeon on this date of service. This time includes time spent by me in the following activities: preparing for the visit, reviewing tests, performing a medically appropriate examination and/or evaluation , counseling and educating the patient/family/caregiver, ordering medications, tests, or procedures and documenting information in the medical record        Follow Up     Return in about 3 months (around 1/14/2022) for Recheck anxiety.  Patient was given instructions and counseling regarding her condition or for health maintenance advice. Please see specific information pulled into the AVS if appropriate.      EMR Dragon transcription disclaimer:  Some of this encounter note is an electronic transcription translation of spoken language to printed text. The electronic translation  of spoken language may permit erroneous, or at times, nonsensical words or phrases to be inadvertently transcribed; Although I have reviewed the note for such errors some may still exist.

## 2021-10-14 NOTE — TELEPHONE ENCOUNTER
Caller: Michell Mckeon    Relationship: Self      Medication requested (name and dosage): diazePAM (Valium) 5 MG tablet    Pharmacy where request should be sent: [facility]/pharmacy #[id number] - [place] - [address] - [phone]  - [phone] FX        Additional details provided by patient: NEEDS PRIOR AUTHORIZATION SENT TO PHARMACY BEFORE THEY WILL FILL IT.     Best call back number: [phone] (H)    Does the patient have less than a 3 day supply:  [x] Yes  [] No    Veto Louis Rep   10/14/21 15:57 EDT         THANKS

## 2021-10-15 ENCOUNTER — TELEPHONE (OUTPATIENT)
Dept: FAMILY MEDICINE CLINIC | Facility: CLINIC | Age: 61
End: 2021-10-15

## 2021-10-15 NOTE — TELEPHONE ENCOUNTER
Caller: Michell Mckeon    Relationship: Self    Best call back number: [phone]    What is the best time to reach you: ANYTIME     Who are you requesting to speak with (clinical staff, provider,  specific staff member): CLINICAL    Do you know the name of the person who called: ALIX    What was the call regarding: PRIOR AUTHORIZATION    Do you require a callback: YES

## 2021-10-15 NOTE — TELEPHONE ENCOUNTER
Sent a message to Elise because this medication was denied by the patient's insurance. Will call patient back once Elise advises on what to do.

## 2021-10-15 NOTE — TELEPHONE ENCOUNTER
CALLER: SARIAN CURTIS    CALL BACK NUMBER:[phone]        PATIENT NEEDS A PRIOR AUTHORIZATION  FOR diazePAM (Valium) 5 MG tablet.

## 2021-10-15 NOTE — TELEPHONE ENCOUNTER
Received a message from insurance company and they denied the authorization for the diazepam. Please advise we did include the medications that were failed in the PA and they still denied and did not advise of another option. I did look on [facility] and without insurance for the strength and qty at [facility] it is only $8.75. I did leave patient a message to let her know this information as well.

## 2021-11-02 RX ORDER — ROPINIROLE 0.5 MG/1
0.5 TABLET, FILM COATED ORAL NIGHTLY
Qty: 30 TABLET | Refills: 2 | Status: SHIPPED | OUTPATIENT
Start: 2021-11-02 | End: 2022-02-07

## 2021-11-08 ENCOUNTER — TELEPHONE (OUTPATIENT)
Dept: FAMILY MEDICINE CLINIC | Facility: CLINIC | Age: 61
End: 2021-11-08

## 2021-11-08 NOTE — TELEPHONE ENCOUNTER
Caller: Michell Mckeon    Relationship: Self    Best call back number: [phone]    What medication are you requesting: DIFLUCAN (7 DAY SUPPLY)    What are your current symptoms: VAGINAL ITCHING    How long have you been experiencing symptoms: 3 DAYS    Have you had these symptoms before:    [x] Yes  [] No    Have you been treated for these symptoms before:   [x] Yes  [] No    If a prescription is needed, what is your preferred pharmacy and phone number: [facility]/PHARMACY #[id number] - [facility] [address] - [phone]  - [phone]      Additional notes: PATIENT STATES SHE THINKS SHE HAS A YEAST INFECTION. PATIENT STATES IT IS THE SAME SYMPTOMS SHE ALWAYS AND CANNOT TELL IF SHE IS HAVING DISCHARGE OR NOT AS SHE USED MONISTAT. PATIENT STATES SHE IS NEEDING A 7 DAY SUPPLY TO ENSURE IT IS CLEARED UP

## 2021-11-09 NOTE — TELEPHONE ENCOUNTER
Is there a possibility of other STD's? Is she using douche? Should not be having so many yeast infections...

## 2021-11-09 NOTE — TELEPHONE ENCOUNTER
"Hub ok to share:    \"Is there a possibility of other STD's? Is she using douche? Should not be having so many yeast infections...\"  "

## 2021-11-10 RX ORDER — FLUCONAZOLE 150 MG/1
150 TABLET ORAL DAILY
Qty: 5 TABLET | Refills: 0 | Status: SHIPPED | OUTPATIENT
Start: 2021-11-10 | End: 2021-11-15

## 2021-11-10 NOTE — TELEPHONE ENCOUNTER
Caller: Michell Mckeon    Relationship: Self    Best call back number: [phone]     What is the best time to reach you: ANYTIME    Who are you requesting to speak with (clinical staff, provider,  specific staff member): CLINICAL    What was the call regarding: PATIENT STATES SHE HAS NOT HAD A YEAST INFECTION IN YEARS. PATIENT STATES SHE WOULD A;WAYS GET PRESCRIBED THE DIFLUCAN ANYTIME SHE HAS HAD YEAST INFECTIONS.     I READ THE HUB TO READ TO PATIENT AND SHE STATES THERE IS NO CHANCE OF HAVING A STD AND SHE HAS NOT USED A DOUCHE.     PLEASE CALL AND ADVISE.

## 2021-11-15 ENCOUNTER — OFFICE VISIT (OUTPATIENT)
Dept: FAMILY MEDICINE CLINIC | Facility: CLINIC | Age: 61
End: 2021-11-15

## 2021-11-15 VITALS
HEIGHT: 64 IN | WEIGHT: 101.2 LBS | HEART RATE: 88 BPM | BODY MASS INDEX: 17.28 KG/M2 | DIASTOLIC BLOOD PRESSURE: 79 MMHG | SYSTOLIC BLOOD PRESSURE: 130 MMHG | OXYGEN SATURATION: 100 %

## 2021-11-15 DIAGNOSIS — N89.8 VAGINAL DISCHARGE: ICD-10-CM

## 2021-11-15 DIAGNOSIS — R82.998 LEUKOCYTES IN URINE: ICD-10-CM

## 2021-11-15 DIAGNOSIS — F41.9 ANXIETY: ICD-10-CM

## 2021-11-15 DIAGNOSIS — Z72.51 HIGH RISK HETEROSEXUAL BEHAVIOR: Primary | ICD-10-CM

## 2021-11-15 PROBLEM — B00.9 HERPESVIRAL INFECTION: Status: ACTIVE | Noted: 2017-01-24

## 2021-11-15 PROBLEM — E78.5 HYPERLIPIDEMIA: Status: ACTIVE | Noted: 2021-11-15

## 2021-11-15 PROBLEM — M54.2 NECK PAIN: Status: ACTIVE | Noted: 2021-11-15

## 2021-11-15 PROBLEM — M19.90 OSTEOARTHRITIS: Status: ACTIVE | Noted: 2021-11-15

## 2021-11-15 PROBLEM — K58.9 IRRITABLE BOWEL SYNDROME: Status: ACTIVE | Noted: 2021-11-15

## 2021-11-15 PROBLEM — E55.9 VITAMIN D DEFICIENCY: Status: ACTIVE | Noted: 2017-02-27

## 2021-11-15 PROBLEM — M81.0 OSTEOPOROSIS: Status: ACTIVE | Noted: 2021-11-15

## 2021-11-15 PROCEDURE — 87491 CHLMYD TRACH DNA AMP PROBE: CPT | Performed by: NURSE PRACTITIONER

## 2021-11-15 PROCEDURE — 87086 URINE CULTURE/COLONY COUNT: CPT | Performed by: NURSE PRACTITIONER

## 2021-11-15 PROCEDURE — 87661 TRICHOMONAS VAGINALIS AMPLIF: CPT | Performed by: NURSE PRACTITIONER

## 2021-11-15 PROCEDURE — 87591 N.GONORRHOEAE DNA AMP PROB: CPT | Performed by: NURSE PRACTITIONER

## 2021-11-15 PROCEDURE — 96372 THER/PROPH/DIAG INJ SC/IM: CPT | Performed by: NURSE PRACTITIONER

## 2021-11-15 PROCEDURE — 99213 OFFICE O/P EST LOW 20 MIN: CPT | Performed by: NURSE PRACTITIONER

## 2021-11-15 RX ORDER — DIAZEPAM 5 MG/1
TABLET ORAL
Qty: 40 TABLET | Refills: 2 | Status: SHIPPED | OUTPATIENT
Start: 2021-11-15 | End: 2022-03-02 | Stop reason: SDUPTHER

## 2021-11-15 RX ORDER — CEFTRIAXONE 1 G/1
1 INJECTION, POWDER, FOR SOLUTION INTRAMUSCULAR; INTRAVENOUS ONCE
Status: COMPLETED | OUTPATIENT
Start: 2021-11-15 | End: 2021-11-15

## 2021-11-15 RX ORDER — FLUCONAZOLE 150 MG/1
150 TABLET ORAL ONCE
Qty: 2 TABLET | Refills: 0 | Status: SHIPPED | OUTPATIENT
Start: 2021-11-17 | End: 2021-11-17

## 2021-11-15 RX ORDER — AZITHROMYCIN 500 MG/1
2000 TABLET, FILM COATED ORAL DAILY
Qty: 4 TABLET | Refills: 0 | Status: SHIPPED | OUTPATIENT
Start: 2021-11-15 | End: 2021-12-14

## 2021-11-15 RX ADMIN — CEFTRIAXONE 1 G: 1 INJECTION, POWDER, FOR SOLUTION INTRAMUSCULAR; INTRAVENOUS at 16:40

## 2021-11-15 NOTE — PROGRESS NOTES
Chief Complaint  Vaginal Discharge (denies itching (did have itching initially), pain, douching, odor, no changes;  yellow in color, )    Subjective          Michell Mckeon presents to [facility] for   History of Present Illness    Patient here for acute work in visit for further evaluation of vaginal discharge.  She does report recently having unprotected sex and shortly after developed thick yellow nonodorous discharge.  She was treated with Diflucan however symptoms have not improved.       The following portions of the patient's history were reviewed and updated as appropriate: allergies, current medications, past family history, past medical history, past social history, past surgical history and problem list.    Past Medical History:   Diagnosis Date   • Anemia    • Anxiety    • Arthritis    • Back pain    • Chronic pain    • DDD (degenerative disc disease), cervical    • Depression    • Excessive daytime sleepiness    • Generalized headaches    • Head injury    • High cholesterol    • IBS (irritable bowel syndrome)    • Insomnia    • Neck pain    • Obstructive sleep apnea    • Osteoarthritis    • Osteoarthritis    • Osteoporosis    • Restless leg syndrome    • Rheumatoid arthritis (HCC)    • Snoring    • Thyroid disease      Past Surgical History:   Procedure Laterality Date   • BREAST LUMPECTOMY Left     x3   • KNEE SURGERY     • TONSILLECTOMY       Family History   Problem Relation Age of Onset   • Arthritis Mother    • Heart disease Mother    • Hypertension Mother    • Bipolar disorder Son    • OCD Son      Social History     Tobacco Use   • Smoking status: Current Every Day Smoker     Packs/day: 1.00     Years: 40.00     Pack years: 40.00   • Smokeless tobacco: Never Used   Substance Use Topics   • Alcohol use: Yes       Current Outpatient Medications:   •  acyclovir (ZOVIRAX) 400 MG tablet, Take 1 by mouth 5 times daily for 5 days for breakout, otherwise take 1 p.o. daily,  "Disp: 50 tablet, Rfl: 2  •  atorvastatin (LIPITOR) 20 MG tablet, , Disp: , Rfl:   •  busPIRone (BUSPAR) 15 MG tablet, Take 1 tablet by mouth 2 (two) times a day., Disp: 60 tablet, Rfl: 2  •  Cariprazine HCl (Vraylar) 6 MG capsule, Take 1 capsule by mouth Daily., Disp: 90 capsule, Rfl: 1  •  diazePAM (Valium) 5 MG tablet, Take 1/2 to 1 tablet by mouth every 12 hours as needed for breakthrough panic/anxiety (30 day supply), Disp: 40 tablet, Rfl: 2  •  Diclofenac Sodium (VOLTAREN) 1 % gel gel, APPLY 4 G TOPICALLY TO THE APPROPRIATE AREA AS DIRECTED 4 (FOUR) TIMES A DAY AS NEEDED (PAIN)., Disp: 200 g, Rfl: 1  •  levothyroxine (SYNTHROID, LEVOTHROID) 137 MCG tablet, Take 1 tablet by mouth Daily., Disp: 90 tablet, Rfl: 1  •  methocarbamol (ROBAXIN) 750 MG tablet, TAKE 1 TABLET BY MOUTH THREE TIMES A DAY AS NEEDED FOR MUSCLE SPASMS, Disp: 60 tablet, Rfl: 2  •  naproxen (Naprosyn) 500 MG tablet, Take 1 tablet by mouth Daily As Needed for Moderate Pain ., Disp: 30 tablet, Rfl: 5  •  rOPINIRole (REQUIP) 0.5 MG tablet, TAKE 1 TABLET BY MOUTH EVERY NIGHT. TAKE 1 HOUR BEFORE BEDTIME., Disp: 30 tablet, Rfl: 2  •  traZODone (DESYREL) 300 MG tablet, Take 1 tablet by mouth At Night As Needed for Sleep., Disp: 90 tablet, Rfl: 1  •  azithromycin (Zithromax) 500 MG tablet, Take 4 tablets by mouth Daily., Disp: 4 tablet, Rfl: 0  •  [START ON 11/17/2021] fluconazole (Diflucan) 150 MG tablet, Take 1 tablet by mouth 1 (One) Time for 1 dose. On Wednesday, may repeat on Friday if needed., Disp: 2 tablet, Rfl: 0  No current facility-administered medications for this visit.    Objective   Vital Signs:   /79 (BP Location: Left arm, Patient Position: Sitting, Cuff Size: Small Adult)   Pulse 88   Ht 162.6 cm (64.02\")   Wt 45.9 kg (101 lb 3.2 oz)   SpO2 100%   BMI 17.36 kg/m²       Physical Exam  Vitals and nursing note reviewed.   Constitutional:       General: She is not in acute distress.     Appearance: She is well-developed. She is " not diaphoretic.   Eyes:      Pupils: Pupils are equal, round, and reactive to light.   Neck:      Thyroid: No thyromegaly.      Vascular: No JVD.   Cardiovascular:      Rate and Rhythm: Normal rate and regular rhythm.      Heart sounds: Normal heart sounds. No murmur heard.      Pulmonary:      Effort: Pulmonary effort is normal. No respiratory distress.      Breath sounds: Normal breath sounds.   Abdominal:      General: Bowel sounds are normal. There is no distension.      Palpations: Abdomen is soft.      Tenderness: There is no abdominal tenderness.   Musculoskeletal:         General: No tenderness. Normal range of motion.      Cervical back: Normal range of motion and neck supple.   Skin:     General: Skin is warm and dry.   Neurological:      Mental Status: She is alert and oriented to person, place, and time.      Sensory: No sensory deficit.   Psychiatric:         Behavior: Behavior normal.         Thought Content: Thought content normal.         Judgment: Judgment normal.          Result Review :     No visits with results within 7 Day(s) from this visit.   Latest known visit with results is:   Office Visit on 03/26/2021   Component Date Value Ref Range Status   • Hepatitis C Ab 03/26/2021 Non-Reactive  Non-Reactive Final   • TSH 03/26/2021 1.940  0.270 - 4.200 uIU/mL Final                       Assessment and Plan    Diagnoses and all orders for this visit:    1. High risk heterosexual behavior (Primary)  -     Chlamydia trachomatis, Neisseria gonorrhoeae, Trichomonas vaginalis, PCR - Swab, Urine, Clean Catch  -     cefTRIAXone (ROCEPHIN) injection 1 g    2. Vaginal discharge  -     Chlamydia trachomatis, Neisseria gonorrhoeae, Trichomonas vaginalis, PCR - Swab, Urine, Clean Catch  -     cefTRIAXone (ROCEPHIN) injection 1 g    3. Leukocytes in urine  -     Urine Culture - Urine, Urine, Clean Catch    4. Anxiety  Comments:   improved, cont vraylar, BuSpar twice daily, okay to refill Valium for breakthrough  anxiety/panic.   Orders:  -     diazePAM (Valium) 5 MG tablet; Take 1/2 to 1 tablet by mouth every 12 hours as needed for breakthrough panic/anxiety (30 day supply)  Dispense: 40 tablet; Refill: 2    Other orders  -     azithromycin (Zithromax) 500 MG tablet; Take 4 tablets by mouth Daily.  Dispense: 4 tablet; Refill: 0  -     fluconazole (Diflucan) 150 MG tablet; Take 1 tablet by mouth 1 (One) Time for 1 dose. On Wednesday, may repeat on Friday if needed.  Dispense: 2 tablet; Refill: 0      -Azithromycin as ordered above, Rocephin IM today  -Return for Pap smear in 1-2 weeks if no improvement in symptoms  -We will notify patient of urine results    I spent 20 minutes caring for Michell Mckeon on this date of service. This time includes time spent by me in the following activities: preparing for the visit, reviewing tests, performing a medically appropriate examination and/or evaluation , counseling and educating the patient/family/caregiver, ordering medications, tests, or procedures and documenting information in the medical record        Follow Up     Return if symptoms worsen or fail to improve in 2 weeks.  Patient was given instructions and counseling regarding her condition or for health maintenance advice. Please see specific information pulled into the AVS if appropriate.      EMR Dragon transcription disclaimer:  Some of this encounter note is an electronic transcription translation of spoken language to printed text. The electronic translation of spoken language may permit erroneous, or at times, nonsensical words or phrases to be inadvertently transcribed; Although I have reviewed the note for such errors some may still exist.

## 2021-11-16 LAB — BACTERIA SPEC AEROBE CULT: NORMAL

## 2021-11-18 ENCOUNTER — TELEPHONE (OUTPATIENT)
Dept: FAMILY MEDICINE CLINIC | Facility: CLINIC | Age: 61
End: 2021-11-18

## 2021-11-18 LAB
C TRACH RRNA SPEC QL NAA+PROBE: NEGATIVE
N GONORRHOEA RRNA SPEC QL NAA+PROBE: NEGATIVE
T VAGINALIS DNA SPEC QL NAA+PROBE: POSITIVE

## 2021-11-18 RX ORDER — METRONIDAZOLE 500 MG/1
500 TABLET ORAL 3 TIMES DAILY
Qty: 21 TABLET | Refills: 0 | Status: SHIPPED | OUTPATIENT
Start: 2021-11-18 | End: 2021-11-25

## 2021-11-18 RX ORDER — FLUCONAZOLE 150 MG/1
150 TABLET ORAL ONCE
Qty: 1 TABLET | Refills: 0 | Status: SHIPPED | OUTPATIENT
Start: 2021-11-18 | End: 2021-11-18

## 2021-11-18 NOTE — TELEPHONE ENCOUNTER
Pt requested the results of STD testing and it looks like they're in.  Can you send results message please?  Thank you.

## 2021-11-23 ENCOUNTER — TELEPHONE (OUTPATIENT)
Dept: FAMILY MEDICINE CLINIC | Facility: CLINIC | Age: 61
End: 2021-11-23

## 2021-11-23 NOTE — TELEPHONE ENCOUNTER
Caller: Michell Mckeon    Relationship: Self    Best call back number: [phone]    What was the call regarding: PATIENT WAS RETURNING RAMIN'S CALL. SHE BELIEVES IT WAS TEST RESULTS. ATTEMPTED WARM TRANSFER, NO ANSWER.     Do you require a callback: PLEASE CALL PATIENT AT HOME OR ON CELL.

## 2021-11-23 NOTE — TELEPHONE ENCOUNTER
Attempted to contact pt, no answer no vm.  There is a hub to share message entered in patient's chart, but I guess it was ignored or overlooked.

## 2021-12-12 DIAGNOSIS — E78.2 MIXED HYPERLIPIDEMIA: ICD-10-CM

## 2021-12-13 DIAGNOSIS — F41.9 ANXIETY: ICD-10-CM

## 2021-12-13 RX ORDER — DIAZEPAM 5 MG/1
TABLET ORAL
Qty: 40 TABLET | Refills: 2 | OUTPATIENT
Start: 2021-12-13

## 2021-12-13 RX ORDER — ATORVASTATIN CALCIUM 20 MG/1
TABLET, FILM COATED ORAL
Qty: 90 TABLET | Refills: 3 | Status: SHIPPED | OUTPATIENT
Start: 2021-12-13 | End: 2023-01-24 | Stop reason: SDUPTHER

## 2021-12-13 NOTE — TELEPHONE ENCOUNTER
Caller: Michell Mckeon    Relationship: Self    Best call back number: [phone]    Requested Prescriptions:   Requested Prescriptions     Pending Prescriptions Disp Refills   • diazePAM (Valium) 5 MG tablet 40 tablet 2     Sig: Take 1/2 to 1 tablet by mouth every 12 hours as needed for breakthrough panic/anxiety (30 day supply)        Pharmacy where request should be sent: [facility]/PHARMACY #[id number] [place], [place] - [address] - [phone]  - [phone] FX     Additional details provided by patient: PATIENT ONLY HAS ONE LEFT NEEDS MORE     Does the patient have less than a 3 day supply:  [x] Yes  [] No    Veto Valadez Rep   12/13/21 10:52 EST

## 2021-12-13 NOTE — TELEPHONE ENCOUNTER
Rx Refill Note  Requested Prescriptions     Pending Prescriptions Disp Refills   • atorvastatin (LIPITOR) 20 MG tablet [Pharmacy Med Name: ATORVASTATIN 20 MG TABLET] 90 tablet 3     Sig: TAKE 1 TABLET BY MOUTH EVERYDAY AT BEDTIME      Last office visit with prescribing clinician: 11/15/2021      Next office visit with prescribing clinician: 12/13/2021            Casandra Reddy MA  12/13/21, 11:29 EST

## 2021-12-14 ENCOUNTER — OFFICE VISIT (OUTPATIENT)
Dept: FAMILY MEDICINE CLINIC | Facility: CLINIC | Age: 61
End: 2021-12-14

## 2021-12-14 VITALS
WEIGHT: 101 LBS | DIASTOLIC BLOOD PRESSURE: 72 MMHG | HEIGHT: 64 IN | SYSTOLIC BLOOD PRESSURE: 107 MMHG | BODY MASS INDEX: 17.24 KG/M2 | OXYGEN SATURATION: 97 % | HEART RATE: 104 BPM

## 2021-12-14 DIAGNOSIS — M79.602 PAIN IN BOTH UPPER EXTREMITIES: Primary | ICD-10-CM

## 2021-12-14 DIAGNOSIS — M79.601 PAIN IN BOTH UPPER EXTREMITIES: Primary | ICD-10-CM

## 2021-12-14 DIAGNOSIS — M47.22 CERVICAL SPONDYLOSIS WITH RADICULOPATHY: ICD-10-CM

## 2021-12-14 DIAGNOSIS — M25.78 DEGENERATION OF INTERVERTEBRAL DISC OF CERVICAL REGION WITH OSTEOPHYTE OF CERVICAL VERTEBRA: ICD-10-CM

## 2021-12-14 DIAGNOSIS — F41.9 ANXIETY: ICD-10-CM

## 2021-12-14 DIAGNOSIS — M50.30 DEGENERATION OF INTERVERTEBRAL DISC OF CERVICAL REGION WITH OSTEOPHYTE OF CERVICAL VERTEBRA: ICD-10-CM

## 2021-12-14 PROCEDURE — 99213 OFFICE O/P EST LOW 20 MIN: CPT | Performed by: NURSE PRACTITIONER

## 2021-12-14 NOTE — PROGRESS NOTES
ReferredChief Complaint  Arm Pain (when lifting arms, turning head pulls at it, and now hurting in wrist)    Subjective          Michell Mckeon presents to [facility] for   History of Present Illness     Patient with history of cervical spine pain, pain management for cervical spondylosis, referred to physical therapy 2020, presents today intermittent axillary arm pain when lifting arms and when turning her head, there is no radiation of pain from neck to shoulders or into the hands. Denies heavy lifting or injury. She is currently on naproxen as needed    Anxiety, improved with vraylar, buspar and prn use valium. Patient denies significant weight loss/gain, insomnia, hypersomnia, psychomotor agitation, psychomotor retardation, fatigue (loss of energy), feelings of worthlessness (guilt), impaired concentration (indecisiveness), thoughts of death or suicide.        The following portions of the patient's history were reviewed and updated as appropriate: allergies, current medications, past family history, past medical history, past social history, past surgical history and problem list.    Past Medical History:   Diagnosis Date   • Anemia    • Anxiety    • Arthritis    • Back pain    • Chronic pain    • DDD (degenerative disc disease), cervical    • Depression    • Excessive daytime sleepiness    • Generalized headaches    • Head injury    • High cholesterol    • IBS (irritable bowel syndrome)    • Insomnia    • Neck pain    • Obstructive sleep apnea    • Osteoarthritis    • Osteoarthritis    • Osteoporosis    • Restless leg syndrome    • Rheumatoid arthritis (HCC)    • Snoring    • Thyroid disease      Past Surgical History:   Procedure Laterality Date   • BREAST LUMPECTOMY Left     x3   • KNEE SURGERY     • TONSILLECTOMY       Family History   Problem Relation Age of Onset   • Arthritis Mother    • Heart disease Mother    • Hypertension Mother    • Bipolar disorder Son    • OCD Son   "    Social History     Tobacco Use   • Smoking status: Current Every Day Smoker     Packs/day: 1.00     Years: 40.00     Pack years: 40.00   • Smokeless tobacco: Never Used   Substance Use Topics   • Alcohol use: Yes       Current Outpatient Medications:   •  acyclovir (ZOVIRAX) 400 MG tablet, Take 1 by mouth 5 times daily for 5 days for breakout, otherwise take 1 p.o. daily, Disp: 50 tablet, Rfl: 2  •  atorvastatin (LIPITOR) 20 MG tablet, TAKE 1 TABLET BY MOUTH EVERYDAY AT BEDTIME, Disp: 90 tablet, Rfl: 3  •  busPIRone (BUSPAR) 15 MG tablet, Take 1 tablet by mouth 2 (two) times a day., Disp: 60 tablet, Rfl: 2  •  Cariprazine HCl (Vraylar) 6 MG capsule, Take 1 capsule by mouth Daily., Disp: 90 capsule, Rfl: 1  •  diazePAM (Valium) 5 MG tablet, Take 1/2 to 1 tablet by mouth every 12 hours as needed for breakthrough panic/anxiety (30 day supply), Disp: 40 tablet, Rfl: 2  •  Diclofenac Sodium (VOLTAREN) 1 % gel gel, APPLY 4 G TOPICALLY TO THE APPROPRIATE AREA AS DIRECTED 4 (FOUR) TIMES A DAY AS NEEDED (PAIN)., Disp: 200 g, Rfl: 1  •  levothyroxine (SYNTHROID, LEVOTHROID) 137 MCG tablet, Take 1 tablet by mouth Daily., Disp: 90 tablet, Rfl: 1  •  methocarbamol (ROBAXIN) 750 MG tablet, TAKE 1 TABLET BY MOUTH THREE TIMES A DAY AS NEEDED FOR MUSCLE SPASMS, Disp: 60 tablet, Rfl: 2  •  naproxen (Naprosyn) 500 MG tablet, Take 1 tablet by mouth Daily As Needed for Moderate Pain ., Disp: 30 tablet, Rfl: 5  •  rOPINIRole (REQUIP) 0.5 MG tablet, TAKE 1 TABLET BY MOUTH EVERY NIGHT. TAKE 1 HOUR BEFORE BEDTIME., Disp: 30 tablet, Rfl: 2  •  traZODone (DESYREL) 300 MG tablet, Take 1 tablet by mouth At Night As Needed for Sleep., Disp: 90 tablet, Rfl: 1    Objective   Vital Signs:   /72 (BP Location: Left arm, Patient Position: Sitting, Cuff Size: Adult)   Pulse 104   Ht 162.6 cm (64.02\")   Wt 45.8 kg (101 lb)   SpO2 97%   BMI 17.33 kg/m²       Physical Exam  Vitals and nursing note reviewed.   Constitutional:       General: " She is not in acute distress.     Appearance: She is well-developed. She is not diaphoretic.   Eyes:      Pupils: Pupils are equal, round, and reactive to light.   Neck:      Thyroid: No thyromegaly.      Vascular: No JVD.   Cardiovascular:      Rate and Rhythm: Normal rate and regular rhythm.      Pulses: Normal pulses.      Heart sounds: Normal heart sounds. No murmur heard.       Comments: BUE radial pulses wnl, no erythema, swelling  Pulmonary:      Effort: Pulmonary effort is normal. No respiratory distress.      Breath sounds: Normal breath sounds.   Abdominal:      General: Bowel sounds are normal. There is no distension.      Palpations: Abdomen is soft.      Tenderness: There is no abdominal tenderness.   Musculoskeletal:         General: Tenderness (mild axillary tenderness w/ palpation into triceps) present. Normal range of motion.      Cervical back: Normal range of motion and neck supple. No tenderness.   Skin:     General: Skin is warm and dry.   Neurological:      Mental Status: She is alert and oriented to person, place, and time.      Sensory: No sensory deficit.   Psychiatric:         Behavior: Behavior normal.         Thought Content: Thought content normal.         Judgment: Judgment normal.          Result Review :     No visits with results within 7 Day(s) from this visit.   Latest known visit with results is:   Office Visit on 11/15/2021   Component Date Value Ref Range Status   • Chlamydia trachomatis, JENNIFER 11/15/2021 Negative  Negative Final   • Gonococcus by JENNIFER 11/15/2021 Negative  Negative Final   • Trichomonas vaginosis 11/15/2021 Positive* Negative Final   • Urine Culture 11/15/2021 <25,000 CFU/mL Mixed Shirley Isolated   Final                       Assessment and Plan    Diagnoses and all orders for this visit:    1. Pain in both upper extremities (Primary)  Comments:  likely d/t c-spine, rec naproxen BID for 5 days, try robaxin as well for 5 days, then prn    2. Degeneration of  intervertebral disc of cervical region with osteophyte of cervical vertebra    3. Cervical spondylosis with radiculopathy    4. Anxiety      Consider PT, pain may also be stress/anxiety/tension induced      I spent 20 minutes caring for Michell Mckeon on this date of service. This time includes time spent by me in the following activities: preparing for the visit, reviewing tests, performing a medically appropriate examination and/or evaluation , counseling and educating the patient/family/caregiver, ordering medications, tests, or procedures and documenting information in the medical record        Follow Up     Return in about 3 weeks (around 1/4/2022) for Annual physical, chronic conditions, HTN panel, b12 and vitamin D, fasting prior to appt.  Patient was given instructions and counseling regarding her condition or for health maintenance advice. Please see specific information pulled into the AVS if appropriate.      EMR Dragon transcription disclaimer:  Some of this encounter note is an electronic transcription translation of spoken language to printed text. The electronic translation of spoken language may permit erroneous, or at times, nonsensical words or phrases to be inadvertently transcribed; Although I have reviewed the note for such errors some may still exist.

## 2021-12-29 ENCOUNTER — TELEPHONE (OUTPATIENT)
Dept: FAMILY MEDICINE CLINIC | Facility: CLINIC | Age: 61
End: 2021-12-29

## 2021-12-29 NOTE — TELEPHONE ENCOUNTER
Caller: Michell Mckeon    Relationship: Self    Best call back number:     What medication are you requesting:     What are your current symptoms: PAIN FROM CARRYING HER DOG    How long have you been experiencing symptoms:     Have you had these symptoms before:    [] Yes  [] No    Have you been treated for these symptoms before:   [] Yes  [] No    If a prescription is needed, what is your preferred pharmacy and phone number:  [facility]  [address]  [phone]    Additional notes: PATIENT IS CALLING IN TO REQUEST A MEDICATION FOR HER PAIN IN HER BACK AND HER RIGHT ARM. SHE SAID SHE ONLY WANTS SOMETHING FOR A COUPLE OF WEEKS(NOTHING LONG TERM)

## 2021-12-30 NOTE — TELEPHONE ENCOUNTER
PATIENT CALLED BACK IN REGARDING PAIN IN ARM. PLEASE ADVISE. THANK YOU. STATES HER PAIN HAS GOTTEN WORSE

## 2021-12-30 NOTE — TELEPHONE ENCOUNTER
Can you please let pt know that Elise is out of the office until Monday and may not respond until then.  She may also wait until her appt because it is a new medication.  Thank you!  If pt cannot wait that long, please refer to .  Thanks!

## 2022-01-04 ENCOUNTER — TELEMEDICINE (OUTPATIENT)
Dept: FAMILY MEDICINE CLINIC | Facility: CLINIC | Age: 62
End: 2022-01-04

## 2022-01-04 ENCOUNTER — TELEPHONE (OUTPATIENT)
Dept: FAMILY MEDICINE CLINIC | Facility: CLINIC | Age: 62
End: 2022-01-04

## 2022-01-04 DIAGNOSIS — S46.911A MUSCLE STRAIN OF RIGHT UPPER ARM, INITIAL ENCOUNTER: Primary | ICD-10-CM

## 2022-01-04 DIAGNOSIS — E03.9 ACQUIRED HYPOTHYROIDISM: ICD-10-CM

## 2022-01-04 DIAGNOSIS — S49.91XA INJURY OF RIGHT SHOULDER, INITIAL ENCOUNTER: ICD-10-CM

## 2022-01-04 DIAGNOSIS — M50.30 DEGENERATION OF INTERVERTEBRAL DISC OF CERVICAL REGION WITH OSTEOPHYTE OF CERVICAL VERTEBRA: ICD-10-CM

## 2022-01-04 DIAGNOSIS — M25.78 DEGENERATION OF INTERVERTEBRAL DISC OF CERVICAL REGION WITH OSTEOPHYTE OF CERVICAL VERTEBRA: ICD-10-CM

## 2022-01-04 DIAGNOSIS — M54.6 THORACIC SPINE PAIN: ICD-10-CM

## 2022-01-04 PROCEDURE — 99214 OFFICE O/P EST MOD 30 MIN: CPT | Performed by: NURSE PRACTITIONER

## 2022-01-04 RX ORDER — HYDROCODONE BITARTRATE AND ACETAMINOPHEN 5; 325 MG/1; MG/1
1 TABLET ORAL 2 TIMES DAILY PRN
Qty: 30 TABLET | Refills: 0 | Status: SHIPPED | OUTPATIENT
Start: 2022-01-04 | End: 2022-01-19

## 2022-01-04 RX ORDER — METHYLPREDNISOLONE 4 MG/1
TABLET ORAL
Qty: 21 TABLET | Refills: 0 | Status: SHIPPED | OUTPATIENT
Start: 2022-01-04 | End: 2022-01-21

## 2022-01-04 RX ORDER — BACLOFEN 10 MG/1
10 TABLET ORAL 3 TIMES DAILY PRN
Qty: 60 TABLET | Refills: 0 | Status: SHIPPED | OUTPATIENT
Start: 2022-01-04 | End: 2022-01-24

## 2022-01-04 NOTE — PROGRESS NOTES
Chief Complaint   Patient presents with   • Pain     right arm, neck and mid back following injury       You have chosen to receive care through a telemedicine visit. Do you consent to use a telemedicine visit for your medical care today? Yes      HPI     Pt with chronic neck pain, reports her dog pulled her right arm on the leash, now with a deep stabbing pain under the arm, radiating to the neck, mid back, scapula region. Denies numbness/tingling of the arm or hand, has good range of motion of the shoulder, pain 8 out of 10, naproxen and Robaxin are ineffective.     Anxiety, lost her job, she reports high stress, she is using Valium once daily which have been effective and also continue Vraylar.       The following portions of the patient's history were reviewed and updated as appropriate: allergies, current medications, past family history, past medical history, past social history, past surgical history and problem list.      Past Medical History:   Diagnosis Date   • Anemia    • Anxiety    • Arthritis    • Back pain    • Chronic pain    • DDD (degenerative disc disease), cervical    • Depression    • Excessive daytime sleepiness    • Generalized headaches    • Head injury    • High cholesterol    • IBS (irritable bowel syndrome)    • Insomnia    • Neck pain    • Obstructive sleep apnea    • Osteoarthritis    • Osteoarthritis    • Osteoporosis    • Restless leg syndrome    • Rheumatoid arthritis (HCC)    • Snoring    • Thyroid disease      Past Surgical History:   Procedure Laterality Date   • BREAST LUMPECTOMY Left     x3   • KNEE SURGERY     • TONSILLECTOMY       Family History   Problem Relation Age of Onset   • Arthritis Mother    • Heart disease Mother    • Hypertension Mother    • Bipolar disorder Son    • OCD Son      Social History     Tobacco Use   • Smoking status: Current Every Day Smoker     Packs/day: 1.00     Years: 40.00     Pack years: 40.00   • Smokeless tobacco: Never Used   Substance Use  Topics   • Alcohol use: Yes         Current Outpatient Medications:   •  acyclovir (ZOVIRAX) 400 MG tablet, Take 1 by mouth 5 times daily for 5 days for breakout, otherwise take 1 p.o. daily, Disp: 50 tablet, Rfl: 2  •  atorvastatin (LIPITOR) 20 MG tablet, TAKE 1 TABLET BY MOUTH EVERYDAY AT BEDTIME, Disp: 90 tablet, Rfl: 3  •  baclofen (LIORESAL) 10 MG tablet, Take 1 tablet by mouth 3 (Three) Times a Day As Needed for Muscle Spasms., Disp: 60 tablet, Rfl: 0  •  busPIRone (BUSPAR) 15 MG tablet, Take 1 tablet by mouth 2 (two) times a day., Disp: 60 tablet, Rfl: 2  •  Cariprazine HCl (Vraylar) 6 MG capsule, Take 1 capsule by mouth Daily., Disp: 90 capsule, Rfl: 1  •  diazePAM (Valium) 5 MG tablet, Take 1/2 to 1 tablet by mouth every 12 hours as needed for breakthrough panic/anxiety (30 day supply), Disp: 40 tablet, Rfl: 2  •  Diclofenac Sodium (VOLTAREN) 1 % gel gel, APPLY 4 G TOPICALLY TO THE APPROPRIATE AREA AS DIRECTED 4 (FOUR) TIMES A DAY AS NEEDED (PAIN)., Disp: 200 g, Rfl: 1  •  HYDROcodone-acetaminophen (Norco) 5-325 MG per tablet, Take 1 tablet by mouth 2 (Two) Times a Day As Needed for Severe Pain  (right shoulder/arm strain) for up to 15 days., Disp: 30 tablet, Rfl: 0  •  levothyroxine (SYNTHROID, LEVOTHROID) 137 MCG tablet, Take 1 tablet by mouth Daily., Disp: 90 tablet, Rfl: 1  •  methylPREDNISolone (MEDROL) 4 MG dose pack, Take as directed on package instructions., Disp: 21 tablet, Rfl: 0  •  naproxen (Naprosyn) 500 MG tablet, Take 1 tablet by mouth Daily As Needed for Moderate Pain ., Disp: 30 tablet, Rfl: 5  •  rOPINIRole (REQUIP) 0.5 MG tablet, TAKE 1 TABLET BY MOUTH EVERY NIGHT. TAKE 1 HOUR BEFORE BEDTIME., Disp: 30 tablet, Rfl: 2  •  traZODone (DESYREL) 300 MG tablet, Take 1 tablet by mouth At Night As Needed for Sleep., Disp: 90 tablet, Rfl: 1        Review of Systems      There were no vitals filed for this visit.  There is no height or weight on file to calculate BMI.    Physical  Exam  Constitutional:       General: She is not in acute distress.     Comments: Exam otherwise deferred through video/audio visit   Pulmonary:      Effort: Pulmonary effort is normal.   Musculoskeletal:      Cervical back: Tenderness (R upper arm, tricep region and points to areas of pain in mid T/C spine and scapula. mod limited rom) present.   Neurological:      Mental Status: She is alert and oriented to person, place, and time.   Psychiatric:         Mood and Affect: Mood is anxious. Affect is tearful.         Behavior: Behavior normal.         Thought Content: Thought content normal.         Judgment: Judgment normal.         No visits with results within 7 Day(s) from this visit.   Latest known visit with results is:   Office Visit on 11/15/2021   Component Date Value Ref Range Status   • Chlamydia trachomatis, JENNIFER 11/15/2021 Negative  Negative Final   • Gonococcus by JENNIFER 11/15/2021 Negative  Negative Final   • Trichomonas vaginosis 11/15/2021 Positive* Negative Final   • Urine Culture 11/15/2021 <25,000 CFU/mL Mixed Shirley Isolated   Final       Diagnoses and all orders for this visit:    1. Muscle strain of right upper arm, initial encounter (Primary)  -     XR Shoulder 2+ View Right; Future  -     Ambulatory Referral to Physical Therapy Evaluate and treat, Ortho; Heat, Electrotherapy; Stretching, ROM, Strengthening  -     HYDROcodone-acetaminophen (Norco) 5-325 MG per tablet; Take 1 tablet by mouth 2 (Two) Times a Day As Needed for Severe Pain  (right shoulder/arm strain) for up to 15 days.  Dispense: 30 tablet; Refill: 0    2. Degeneration of intervertebral disc of cervical region with osteophyte of cervical vertebra  -     XR Spine Cervical Complete 4 or 5 View; Future  -     Ambulatory Referral to Physical Therapy Evaluate and treat, Ortho; Heat, Electrotherapy; Stretching, ROM, Strengthening    3. Thoracic spine pain  -     XR Spine Thoracic 2 View; Future  -     Ambulatory Referral to Physical Therapy  Evaluate and treat, Ortho; Heat, Electrotherapy; Stretching, ROM, Strengthening    4. Injury of right shoulder, initial encounter  -     XR Shoulder 2+ View Right; Future  -     Ambulatory Referral to Physical Therapy Evaluate and treat, Ortho; Heat, Electrotherapy; Stretching, ROM, Strengthening  -     HYDROcodone-acetaminophen (Norco) 5-325 MG per tablet; Take 1 tablet by mouth 2 (Two) Times a Day As Needed for Severe Pain  (right shoulder/arm strain) for up to 15 days.  Dispense: 30 tablet; Refill: 0    5. Acquired hypothyroidism    Other orders  -     baclofen (LIORESAL) 10 MG tablet; Take 1 tablet by mouth 3 (Three) Times a Day As Needed for Muscle Spasms.  Dispense: 60 tablet; Refill: 0  -     methylPREDNISolone (MEDROL) 4 MG dose pack; Take as directed on package instructions.  Dispense: 21 tablet; Refill: 0        1.  Ok for 2 weeks of norco only for acute pain.   2.  Repeat c-spine xray, check thoracic and R shoulder xrays, start medrol dose pack, naproxen daily for 1 week, d/c robaxin and try baclofen.  Patient had side effects Flexeril in the past  3.  Referral to PT, rest, try heat   4.  Anxiety labile, but improved with valium once daily. She understands to not mix valium with norco, rf when needed. Continue vraylar  5.  Patient stopped taking levothyroxine, recommend she resume and will check labs in 6 weeks with appointment following for annual exam and to discuss      This was an audio and video enabled telemedicine encounter.  Total time of discussion was 25 minutes       Return in about 7 weeks (around 2/22/2022) for Annual physical with HTN panel in 6 weeks and appt to follow.     EMR Dragon transcription disclaimer:  Some of this encounter note is an electronic transcription translation of spoken language to printed text. The electronic translation of spoken language may permit erroneous, or at times, nonsensical words or phrases to be inadvertently transcribed; Although I have reviewed the note  for such errors some may still exist.

## 2022-01-06 RX ORDER — BUSPIRONE HYDROCHLORIDE 15 MG/1
TABLET ORAL
Qty: 60 TABLET | Refills: 2 | Status: SHIPPED | OUTPATIENT
Start: 2022-01-06 | End: 2022-04-08

## 2022-01-21 ENCOUNTER — OFFICE VISIT (OUTPATIENT)
Dept: FAMILY MEDICINE CLINIC | Facility: CLINIC | Age: 62
End: 2022-01-21

## 2022-01-21 VITALS
OXYGEN SATURATION: 97 % | BODY MASS INDEX: 16.49 KG/M2 | DIASTOLIC BLOOD PRESSURE: 78 MMHG | HEART RATE: 94 BPM | SYSTOLIC BLOOD PRESSURE: 124 MMHG | HEIGHT: 64 IN | WEIGHT: 96.6 LBS

## 2022-01-21 DIAGNOSIS — R42 LIGHTHEADEDNESS: ICD-10-CM

## 2022-01-21 DIAGNOSIS — F41.9 ANXIETY: ICD-10-CM

## 2022-01-21 DIAGNOSIS — M51.36 DEGENERATION OF INTERVERTEBRAL DISC OF LUMBAR REGION: ICD-10-CM

## 2022-01-21 DIAGNOSIS — M25.78 DEGENERATION OF INTERVERTEBRAL DISC OF CERVICAL REGION WITH OSTEOPHYTE OF CERVICAL VERTEBRA: Primary | ICD-10-CM

## 2022-01-21 DIAGNOSIS — M50.30 DEGENERATION OF INTERVERTEBRAL DISC OF CERVICAL REGION WITH OSTEOPHYTE OF CERVICAL VERTEBRA: Primary | ICD-10-CM

## 2022-01-21 PROCEDURE — 99213 OFFICE O/P EST LOW 20 MIN: CPT | Performed by: NURSE PRACTITIONER

## 2022-01-21 RX ORDER — HYDROCODONE BITARTRATE AND ACETAMINOPHEN 5; 325 MG/1; MG/1
1 TABLET ORAL EVERY 6 HOURS PRN
Qty: 20 TABLET | Refills: 0 | Status: SHIPPED | OUTPATIENT
Start: 2022-01-21 | End: 2022-03-02

## 2022-01-21 NOTE — PROGRESS NOTES
"Chief Complaint  Stress, Anxiety, Hand Pain (dog pulled her down the steps and she is having severe pain ), Arm Pain, and Back Pain    Subjective          Michell Mckeon presents to [facility]  History of Present Illness    Patient presents with bilateral hand, back and hip pain s/p fall. She reports her [employer] Dia drug her down the steps while chasing a cat. Since, she has had moderate to severe pain that is generalized.  Patient seen for symptoms on 1/4. She was given Norco x 2 weeks as well as Naproxen, Medrol and Baclofen. X-rays ordered, have not been completed. Referral to PT made but patient reports she does not have time for physical therapy.  Patient reports that back pain is 7/10.  She has no accompanying numbness or tingling to bilateral upper or lower extremities.    She also c/o feeling lightheaded and loss of balance that occurred yesterday while at work. She reports she had to go home. Symptoms improved today. Patient reports that she feels increasingly \"stressed.\" She has a history of anxiety and Bipolar Disorder, prescribed Buspar and Vraylar.  Patient is taking as prescribed and also takes Valium as needed.  Patient has been taking Valium twice daily but reports that she feels that the \"Valium is not strong enough anymore.\"    Objective   Vital Signs:   /78 (BP Location: Left arm, Patient Position: Sitting, Cuff Size: Adult)   Pulse 94   Ht 162.6 cm (64\")   Wt 43.8 kg (96 lb 9.6 oz)   SpO2 97%   BMI 16.58 kg/m²       Physical Exam  Constitutional:       Appearance: Normal appearance.   HENT:      Head: Normocephalic.   Cardiovascular:      Rate and Rhythm: Normal rate and regular rhythm.   Pulmonary:      Effort: Pulmonary effort is normal.      Breath sounds: Normal breath sounds.   Abdominal:      General: Abdomen is flat. Bowel sounds are normal.      Palpations: Abdomen is soft.   Musculoskeletal:         General: Normal range of motion.      " Cervical back: Neck supple.      Lumbar back: Tenderness present. No swelling.      Right lower leg: No edema.      Left lower leg: No edema.   Skin:     General: Skin is warm and dry.   Neurological:      Mental Status: She is alert and oriented to person, place, and time.      Gait: Gait is intact.   Psychiatric:         Attention and Perception: Attention normal.         Mood and Affect: Mood normal.         Speech: Speech normal.        Result Review :                 Assessment and Plan    Diagnoses and all orders for this visit:    1. Degeneration of intervertebral disc of cervical region with osteophyte of cervical vertebra (Primary)  -     HYDROcodone-acetaminophen (Norco) 5-325 MG per tablet; Take 1 tablet by mouth Every 6 (Six) Hours As Needed for Severe Pain .  Dispense: 20 tablet; Refill: 0    2. Anxiety  Assessment & Plan:  1.  Continue medications as prescribed  2.  Follow-up with Elise at scheduled visit in February      3. Degeneration of intervertebral disc of lumbar region  Assessment & Plan:  1.  We will provide 1 refill of #20 Norco for her to use as needed for acute severe pain  2.  Advised that patient needs to complete the x-rays as previously ordered  3.  Encouraged she follow through with physical therapy  4.  Advised patient to take naproxen 500 mg twice daily as needed for pain or swelling  5.  She may also use the baclofen as needed  6.  We will call with x-ray results once available      4. Lightheadedness  Assessment & Plan:  1.  Symptoms are resolved at time of visit  2.  Encouraged patient to ensure that she is eating regularly while at work  3.  Take anxiety medication as prescribed  4.  Call if symptoms recur      I spent 25 minutes caring for Michell on this date of service. This time includes time spent by me in the following activities:preparing for the visit, obtaining and/or reviewing a separately obtained history, performing a medically appropriate examination and/or evaluation  , counseling and educating the patient/family/caregiver, ordering medications, tests, or procedures, documenting information in the medical record and care coordination  Follow Up   Return if symptoms worsen or fail to improve.  Patient was given instructions and counseling regarding her condition or for health maintenance advice. Please see specific information pulled into the AVS if appropriate.

## 2022-01-21 NOTE — ASSESSMENT & PLAN NOTE
1.  Symptoms are resolved at time of visit  2.  Encouraged patient to ensure that she is eating regularly while at work  3.  Take anxiety medication as prescribed  4.  Call if symptoms recur

## 2022-01-21 NOTE — ASSESSMENT & PLAN NOTE
1.  We will provide 1 refill of #20 Norco for her to use as needed for acute severe pain  2.  Advised that patient needs to complete the x-rays as previously ordered  3.  Encouraged she follow through with physical therapy  4.  Advised patient to take naproxen 500 mg twice daily as needed for pain or swelling  5.  She may also use the baclofen as needed  6.  We will call with x-ray results once available

## 2022-01-24 RX ORDER — BACLOFEN 10 MG/1
TABLET ORAL
Qty: 60 TABLET | Refills: 0 | Status: SHIPPED | OUTPATIENT
Start: 2022-01-24 | End: 2022-02-21

## 2022-02-07 RX ORDER — NAPROXEN 500 MG/1
500 TABLET ORAL DAILY PRN
Qty: 30 TABLET | Refills: 0 | Status: SHIPPED | OUTPATIENT
Start: 2022-02-07 | End: 2022-03-16 | Stop reason: SDUPTHER

## 2022-02-07 RX ORDER — ROPINIROLE 0.5 MG/1
0.5 TABLET, FILM COATED ORAL NIGHTLY
Qty: 30 TABLET | Refills: 0 | Status: SHIPPED | OUTPATIENT
Start: 2022-02-07 | End: 2022-03-02

## 2022-02-07 NOTE — TELEPHONE ENCOUNTER
Rx Refill Note  Requested Prescriptions     Pending Prescriptions Disp Refills   • naproxen (NAPROSYN) 500 MG tablet [Pharmacy Med Name: NAPROXEN 500 MG TABLET] 30 tablet 5     Sig: TAKE 1 TABLET BY MOUTH DAILY AS NEEDED FOR MODERATE PAIN   • rOPINIRole (REQUIP) 0.5 MG tablet [Pharmacy Med Name: ROPINIROLE HCL 0.5 MG TABLET] 30 tablet 2     Sig: TAKE 1 TABLET BY MOUTH EVERY NIGHT. TAKE 1 HOUR BEFORE BEDTIME.      Last office visit with prescribing clinician: 12/14/2021      Next office visit with prescribing clinician: 2/22/2022            Casandra Reddy MA  02/07/22, 10:40 EST

## 2022-02-08 ENCOUNTER — TELEPHONE (OUTPATIENT)
Dept: FAMILY MEDICINE CLINIC | Facility: CLINIC | Age: 62
End: 2022-02-08

## 2022-02-21 RX ORDER — BACLOFEN 10 MG/1
TABLET ORAL
Qty: 60 TABLET | Refills: 0 | Status: SHIPPED | OUTPATIENT
Start: 2022-02-21 | End: 2022-03-02 | Stop reason: SDUPTHER

## 2022-03-02 ENCOUNTER — TELEPHONE (OUTPATIENT)
Dept: FAMILY MEDICINE CLINIC | Facility: CLINIC | Age: 62
End: 2022-03-02

## 2022-03-02 ENCOUNTER — OFFICE VISIT (OUTPATIENT)
Dept: FAMILY MEDICINE CLINIC | Facility: CLINIC | Age: 62
End: 2022-03-02

## 2022-03-02 VITALS
HEART RATE: 91 BPM | HEIGHT: 64 IN | SYSTOLIC BLOOD PRESSURE: 125 MMHG | WEIGHT: 95 LBS | OXYGEN SATURATION: 99 % | DIASTOLIC BLOOD PRESSURE: 82 MMHG | BODY MASS INDEX: 16.22 KG/M2

## 2022-03-02 DIAGNOSIS — F41.9 ANXIETY: ICD-10-CM

## 2022-03-02 DIAGNOSIS — M47.22 CERVICAL SPONDYLOSIS WITH RADICULOPATHY: ICD-10-CM

## 2022-03-02 DIAGNOSIS — M50.30 DEGENERATION OF INTERVERTEBRAL DISC OF CERVICAL REGION WITH OSTEOPHYTE OF CERVICAL VERTEBRA: Primary | ICD-10-CM

## 2022-03-02 DIAGNOSIS — K21.9 GASTROESOPHAGEAL REFLUX DISEASE WITHOUT ESOPHAGITIS: ICD-10-CM

## 2022-03-02 DIAGNOSIS — E78.2 MIXED HYPERLIPIDEMIA: ICD-10-CM

## 2022-03-02 DIAGNOSIS — M25.78 DEGENERATION OF INTERVERTEBRAL DISC OF CERVICAL REGION WITH OSTEOPHYTE OF CERVICAL VERTEBRA: Primary | ICD-10-CM

## 2022-03-02 DIAGNOSIS — M47.812 FACET ARTHROPATHY, CERVICAL: ICD-10-CM

## 2022-03-02 DIAGNOSIS — G25.81 RLS (RESTLESS LEGS SYNDROME): ICD-10-CM

## 2022-03-02 DIAGNOSIS — E03.9 ACQUIRED HYPOTHYROIDISM: ICD-10-CM

## 2022-03-02 DIAGNOSIS — F31.4 BIPOLAR DISORDER, CURRENT EPISODE DEPRESSED, SEVERE, WITHOUT PSYCHOTIC FEATURES: ICD-10-CM

## 2022-03-02 PROCEDURE — 80050 GENERAL HEALTH PANEL: CPT | Performed by: NURSE PRACTITIONER

## 2022-03-02 PROCEDURE — 80061 LIPID PANEL: CPT | Performed by: NURSE PRACTITIONER

## 2022-03-02 PROCEDURE — 99214 OFFICE O/P EST MOD 30 MIN: CPT | Performed by: NURSE PRACTITIONER

## 2022-03-02 PROCEDURE — 83690 ASSAY OF LIPASE: CPT | Performed by: NURSE PRACTITIONER

## 2022-03-02 RX ORDER — DIAZEPAM 5 MG/1
TABLET ORAL
Qty: 40 TABLET | Refills: 2 | Status: SHIPPED | OUTPATIENT
Start: 2022-03-02 | End: 2022-05-09 | Stop reason: SDUPTHER

## 2022-03-02 RX ORDER — DICLOFENAC SODIUM 75 MG/1
75 TABLET, DELAYED RELEASE ORAL 2 TIMES DAILY
COMMUNITY
End: 2022-03-16

## 2022-03-02 RX ORDER — OMEPRAZOLE 40 MG/1
40 CAPSULE, DELAYED RELEASE ORAL DAILY
Qty: 90 CAPSULE | Refills: 1 | Status: SHIPPED | OUTPATIENT
Start: 2022-03-02 | End: 2022-08-26

## 2022-03-02 RX ORDER — BACLOFEN 10 MG/1
10 TABLET ORAL 3 TIMES DAILY PRN
Qty: 60 TABLET | Refills: 2 | Status: SHIPPED | OUTPATIENT
Start: 2022-03-02 | End: 2022-06-27

## 2022-03-02 NOTE — TELEPHONE ENCOUNTER
PATIENT CALLED TO MAKE AN APPT FOR LABS  I SPOKE TO ZENAIDA AT THE OFFICE AND SHE ADVISED ME THAT THERE IS NO LAB ORDERS, [facility] HAS NOT PUT IN ANY LAB ORDERS YET.    ADVISED PATIENT WILL TALK TO THE PROVIDER AND CALL HER BACK    # [phone]

## 2022-03-02 NOTE — PROGRESS NOTES
"Chief Complaint  Neck Pain (pain in neck that is shooting down both arms), Abdominal Pain (severe pain in middle stomach when eating ), and Diarrhea    Subjective          Michell Mckeon presents to [facility] for   History of Present Illness     Patient was last seen for chronic neck pain with acute injury on 1/4/2022.  She was ordered for x-rays of the cervical, thoracic and shoulder of which have not been completed, she was also referred to physical therapy and that appointment has not been made. She reports pain of BUE with movement and rotation of the neck.  She is requesting hydrocodone  XR spine cervical ap and lat w flex and ext (07/24/2020)     Mid epigastric abdominal pain, diarrhea with each meal. Eats corn meal, eggs, soups. No spicy, fried foods.  She denies blood and mucus in her stool    She initially states \"is taking all meds\", however states she started feeling better and stopped Vraylar, is no longer taking trazodone as she is sleeping okay and Requip was ineffective.    Hypothyroidism, stable on medication, denies symptoms of constipation, weight gain/loss, hot or cold intolerance, hair loss, abnormal heart rate and fatigue.     Bipolar depression, anxiety.  Patient stopped Vraylar as she started feeling better, she is requesting a refill of diazepam which she is using 1-2 times per day and continues buspirone.         The following portions of the patient's history were reviewed and updated as appropriate: allergies, current medications, past family history, past medical history, past social history, past surgical history and problem list.    Past Medical History:   Diagnosis Date   • Anemia    • Anxiety    • Arthritis    • Back pain    • Chronic pain    • DDD (degenerative disc disease), cervical    • Depression    • Excessive daytime sleepiness    • Generalized headaches    • Head injury    • High cholesterol    • IBS (irritable bowel syndrome)    • Insomnia    • " Neck pain    • Obstructive sleep apnea    • Osteoarthritis    • Osteoarthritis    • Osteoporosis    • Restless leg syndrome    • Rheumatoid arthritis (HCC)    • Snoring    • Thyroid disease      Past Surgical History:   Procedure Laterality Date   • BREAST LUMPECTOMY Left     x3   • KNEE SURGERY     • TONSILLECTOMY       Family History   Problem Relation Age of Onset   • Arthritis Mother    • Heart disease Mother    • Hypertension Mother    • Bipolar disorder Son    • OCD Son      Social History     Tobacco Use   • Smoking status: Current Every Day Smoker     Packs/day: 1.00     Years: 40.00     Pack years: 40.00   • Smokeless tobacco: Never Used   Substance Use Topics   • Alcohol use: Yes       Current Outpatient Medications:   •  acyclovir (ZOVIRAX) 400 MG tablet, Take 1 by mouth 5 times daily for 5 days for breakout, otherwise take 1 p.o. daily, Disp: 50 tablet, Rfl: 2  •  atorvastatin (LIPITOR) 20 MG tablet, TAKE 1 TABLET BY MOUTH EVERYDAY AT BEDTIME, Disp: 90 tablet, Rfl: 3  •  baclofen (LIORESAL) 10 MG tablet, Take 1 tablet by mouth 3 (Three) Times a Day As Needed for Muscle Spasms., Disp: 60 tablet, Rfl: 2  •  busPIRone (BUSPAR) 15 MG tablet, TAKE 1 TABLET BY MOUTH TWICE A DAY, Disp: 60 tablet, Rfl: 2  •  Cariprazine HCl (Vraylar) 6 MG capsule, Take 1 capsule by mouth Daily., Disp: 90 capsule, Rfl: 1  •  diazePAM (Valium) 5 MG tablet, Take 1/2 to 1 tablet by mouth every 12 hours as needed for breakthrough panic/anxiety (30 day supply), Disp: 40 tablet, Rfl: 2  •  diclofenac (VOLTAREN) 75 MG EC tablet, Take 75 mg by mouth 2 (Two) Times a Day., Disp: , Rfl:   •  Diclofenac Sodium (VOLTAREN) 1 % gel gel, APPLY 4 G TOPICALLY TO THE APPROPRIATE AREA AS DIRECTED 4 (FOUR) TIMES A DAY AS NEEDED (PAIN)., Disp: 200 g, Rfl: 1  •  levothyroxine (SYNTHROID, LEVOTHROID) 137 MCG tablet, Take 1 tablet by mouth Daily., Disp: 90 tablet, Rfl: 1  •  naproxen (NAPROSYN) 500 MG tablet, TAKE 1 TABLET BY MOUTH DAILY AS NEEDED FOR  "MODERATE PAIN, Disp: 30 tablet, Rfl: 0  •  traZODone (DESYREL) 300 MG tablet, Take 1 tablet by mouth At Night As Needed for Sleep., Disp: 90 tablet, Rfl: 1  •  omeprazole (priLOSEC) 40 MG capsule, Take 1 capsule by mouth Daily., Disp: 90 capsule, Rfl: 1    Objective   Vital Signs:   /82 (BP Location: Left arm, Patient Position: Sitting, Cuff Size: Adult)   Pulse 91   Ht 162.6 cm (64\")   Wt 43.1 kg (95 lb)   SpO2 99%   BMI 16.31 kg/m²       Physical Exam  Vitals and nursing note reviewed.   Constitutional:       General: She is not in acute distress.     Appearance: She is well-developed. She is not diaphoretic.   Eyes:      Pupils: Pupils are equal, round, and reactive to light.   Neck:      Thyroid: No thyromegaly.      Vascular: No JVD.   Cardiovascular:      Rate and Rhythm: Normal rate and regular rhythm.      Heart sounds: Normal heart sounds. No murmur heard.      Pulmonary:      Effort: Pulmonary effort is normal. No respiratory distress.      Breath sounds: Normal breath sounds.   Abdominal:      General: Bowel sounds are normal. There is no distension.      Palpations: Abdomen is soft.      Tenderness: There is abdominal tenderness (Mid epigastric/LUQ).   Musculoskeletal:         General: No tenderness. Normal range of motion.      Cervical back: Normal range of motion and neck supple.   Skin:     General: Skin is warm and dry.   Neurological:      Mental Status: She is alert and oriented to person, place, and time.      Sensory: No sensory deficit.   Psychiatric:         Behavior: Behavior normal.         Thought Content: Thought content normal.         Judgment: Judgment normal.          Result Review :     No visits with results within 7 Day(s) from this visit.   Latest known visit with results is:   Office Visit on 11/15/2021   Component Date Value Ref Range Status   • Chlamydia trachomatis, JENNIFER 11/15/2021 Negative  Negative Final   • Gonococcus by JENNIFER 11/15/2021 Negative  Negative Final   • " Trichomonas vaginosis 11/15/2021 Positive* Negative Final   • Urine Culture 11/15/2021 <25,000 CFU/mL Mixed Shirley Isolated   Final                       Assessment and Plan    Diagnoses and all orders for this visit:    1. Degeneration of intervertebral disc of cervical region with osteophyte of cervical vertebra (Primary)  Comments:  complete xrays, must schedule PT. no pain meds at this time. cont naproxen and baclofen prn.  Will likely refer to pain management after xray viewed.   Orders:  -     baclofen (LIORESAL) 10 MG tablet; Take 1 tablet by mouth 3 (Three) Times a Day As Needed for Muscle Spasms.  Dispense: 60 tablet; Refill: 2    2. Cervical spondylosis with radiculopathy    3. Acquired hypothyroidism  Comments:  TSH today.  Refill/adjust levothyroxine as indicated  Orders:  -     TSH    4. Mixed hyperlipidemia  -     Lipid Panel  -     Comprehensive Metabolic Panel  -     CBC (No Diff)    5. Anxiety  Comments:  Must continue Vraylar, will refill valium for severe anxiety only PRN if she is consistent with taking Vraylar for bipolar depression and anxiety maintenence  Orders:  -     diazePAM (Valium) 5 MG tablet; Take 1/2 to 1 tablet by mouth every 12 hours as needed for breakthrough panic/anxiety (30 day supply)  Dispense: 40 tablet; Refill: 2    6. RLS (restless legs syndrome)  Comments:  stopped requip, will d/c.     7. Facet arthropathy, cervical  Comments:  complete xrays, patient also needs to call physical therapy and schedule.  Likely Will refer to pain mgmt once x-rays reviewed. no norco.     8. Gastroesophageal reflux disease without esophagitis  Comments:  start omeprazole.  Diarrhea may be secondary to stopping Vraylar, increased anxiety and reflux.  Continue Imodium as needed  Orders:  -     Lipase  -     omeprazole (priLOSEC) 40 MG capsule; Take 1 capsule by mouth Daily.  Dispense: 90 capsule; Refill: 1    9. Bipolar disorder, current episode depressed, severe, without psychotic features  (formerly [facility])  Comments:  Patient must continue maintenance medication, recommend resume Vraylar and take daily even when symptoms seem to be stable.        I spent 30 minutes caring for Michell Mckeon on this date of service. This time includes time spent by me in the following activities: preparing for the visit, reviewing tests, performing a medically appropriate examination and/or evaluation , counseling and educating the patient/family/caregiver, ordering medications, tests, or procedures and documenting information in the medical record        Follow Up     Return in about 3 months (around 6/2/2022) for Recheck, valium refill. .  Patient was given instructions and counseling regarding her condition or for health maintenance advice. Please see specific information pulled into the AVS if appropriate.        Part of this note may be an electronic transcription/translation of spoken language to printed text using the Dragon Dictation System

## 2022-03-02 NOTE — TELEPHONE ENCOUNTER
PATIENT STATES: DICLOFENAC SOD EC 75MG TAB VOLTAREN TAKE 1 BY MOUTH TWICE A DAY      PATIENT CAN BE REACHED ON: [phone]

## 2022-03-03 LAB
ALBUMIN SERPL-MCNC: 4.3 G/DL (ref 3.5–5.2)
ALBUMIN/GLOB SERPL: 1.5 G/DL
ALP SERPL-CCNC: 68 U/L (ref 39–117)
ALT SERPL W P-5'-P-CCNC: 30 U/L (ref 1–33)
ANION GAP SERPL CALCULATED.3IONS-SCNC: 12.2 MMOL/L (ref 5–15)
AST SERPL-CCNC: 34 U/L (ref 1–32)
BILIRUB SERPL-MCNC: 0.4 MG/DL (ref 0–1.2)
BUN SERPL-MCNC: 17 MG/DL (ref 8–23)
BUN/CREAT SERPL: 22.7 (ref 7–25)
CALCIUM SPEC-SCNC: 9.5 MG/DL (ref 8.6–10.5)
CHLORIDE SERPL-SCNC: 106 MMOL/L (ref 98–107)
CHOLEST SERPL-MCNC: 197 MG/DL (ref 0–200)
CO2 SERPL-SCNC: 22.8 MMOL/L (ref 22–29)
CREAT SERPL-MCNC: 0.75 MG/DL (ref 0.57–1)
DEPRECATED RDW RBC AUTO: 43 FL (ref 37–54)
EGFRCR SERPLBLD CKD-EPI 2021: 90.7 ML/MIN/1.73
ERYTHROCYTE [DISTWIDTH] IN BLOOD BY AUTOMATED COUNT: 12.8 % (ref 12.3–15.4)
GLOBULIN UR ELPH-MCNC: 2.8 GM/DL
GLUCOSE SERPL-MCNC: 112 MG/DL (ref 65–99)
HCT VFR BLD AUTO: 40.4 % (ref 34–46.6)
HDLC SERPL-MCNC: 107 MG/DL (ref 40–60)
HGB BLD-MCNC: 13.6 G/DL (ref 12–15.9)
LDLC SERPL CALC-MCNC: 65 MG/DL (ref 0–100)
LDLC/HDLC SERPL: 0.55 {RATIO}
LIPASE SERPL-CCNC: 26 U/L (ref 13–60)
MCH RBC QN AUTO: 31 PG (ref 26.6–33)
MCHC RBC AUTO-ENTMCNC: 33.7 G/DL (ref 31.5–35.7)
MCV RBC AUTO: 92 FL (ref 79–97)
PLATELET # BLD AUTO: 142 10*3/MM3 (ref 140–450)
PMV BLD AUTO: 11.5 FL (ref 6–12)
POTASSIUM SERPL-SCNC: 4.4 MMOL/L (ref 3.5–5.2)
PROT SERPL-MCNC: 7.1 G/DL (ref 6–8.5)
RBC # BLD AUTO: 4.39 10*6/MM3 (ref 3.77–5.28)
SODIUM SERPL-SCNC: 141 MMOL/L (ref 136–145)
TRIGL SERPL-MCNC: 158 MG/DL (ref 0–150)
TSH SERPL DL<=0.05 MIU/L-ACNC: 0.98 UIU/ML (ref 0.27–4.2)
VLDLC SERPL-MCNC: 25 MG/DL (ref 5–40)
WBC NRBC COR # BLD: 5.8 10*3/MM3 (ref 3.4–10.8)

## 2022-03-09 ENCOUNTER — TELEPHONE (OUTPATIENT)
Dept: FAMILY MEDICINE CLINIC | Facility: CLINIC | Age: 62
End: 2022-03-09

## 2022-03-09 NOTE — TELEPHONE ENCOUNTER
Caller: Michell Mckeon    Relationship: Self    Best call back number: [phone]    What medication are you requesting: ANTIDIARRHEA      What are your current symptoms: ONGOING DIARRHEA     Have you had these symptoms before:    [x] Yes  [] No    Have you been treated for these symptoms before:   [x] Yes  [] No    If a prescription is needed, what is your preferred pharmacy and phone number: [facility] #[id number] - [place], [place] - [address] - [phone]  - [phone] FX     Additional notes: PATIENT REQUESTING PILL FORM FOR ANTIDIARRHEA MEDICATION.

## 2022-03-09 NOTE — TELEPHONE ENCOUNTER
"Hub ok to share:    \"Is pt experiencing any other symptoms (fever, nausea, vomiting, black stool, blood in stool, etc)?  Has she tried anything OTC (imodium, pepto bismol, etc)?\"  "

## 2022-03-10 DIAGNOSIS — R19.7 DIARRHEA, UNSPECIFIED TYPE: Primary | ICD-10-CM

## 2022-03-10 RX ORDER — DIPHENOXYLATE HYDROCHLORIDE AND ATROPINE SULFATE 2.5; .025 MG/1; MG/1
1 TABLET ORAL 4 TIMES DAILY PRN
Qty: 30 TABLET | Refills: 0 | Status: SHIPPED | OUTPATIENT
Start: 2022-03-10 | End: 2022-07-29

## 2022-03-10 NOTE — TELEPHONE ENCOUNTER
I sent Lomotil to her pharmacy for diarrhea to use up to 4 times a day as needed following a loose/diarrhea stool.  Continue Prilosec.  Please with her that she restarted Vraylar, s/s may be r/t anxiety and labile mood. If no improvement in diarrhea in the next three 1 week please have her reschedule appointment.

## 2022-03-10 NOTE — TELEPHONE ENCOUNTER
Denies any other symptoms other than stomach pains, but reports that the Prilosec helped that.  Pt asked if she could get a prescription strength anti-diarrheal that her insurance will pay for.

## 2022-03-10 NOTE — TELEPHONE ENCOUNTER
THE PATIENT CALLED BACK - SHE STATES THAT SHE HAS TRIED THE IMODIUM AND HAS TAKEN ANYWHERE FROM 2-6 PILLS PER DAY, BUT IT DOES NOT HELP. SHE IS SPENDING TOO MUCH MONEY ON THE IMODIUM, WHICH IS WHY SHE WANTS SOMETHING STRONGER. THE PATIENT REQUESTED TO SPEAK WITH MARY GRACE, BUT THE HUB COULD NOT SUCCESSFULLY WARM TRANSFER THE CALL. THE PATIENT WOULD LIKE FOR MARY GRACE TO CALL HER BEFORE 9 A.M., IF POSSIBLE, AT THE NUMBER [phone].

## 2022-03-15 ENCOUNTER — TELEPHONE (OUTPATIENT)
Dept: FAMILY MEDICINE CLINIC | Facility: CLINIC | Age: 62
End: 2022-03-15

## 2022-03-15 DIAGNOSIS — S42.001A CLOSED NONDISPLACED FRACTURE OF RIGHT CLAVICLE, UNSPECIFIED PART OF CLAVICLE, INITIAL ENCOUNTER: Primary | ICD-10-CM

## 2022-03-15 NOTE — TELEPHONE ENCOUNTER
Will see her tomorrow and order rx alternative compounded pain cream, will then decide on duty restrictions as well.

## 2022-03-15 NOTE — TELEPHONE ENCOUNTER
Caller: Michell Mckeon    Relationship: Self    Best call back number: [phone] (H)    What medication are you requesting: HYDROCODONE     What are your current symptoms: LEFT ARM PAIN, BICEP MUSCLES FROM SHOULDER TO ELBOW     How long have you been experiencing symptoms: A FEW WEEKS     Have you had these symptoms before:  [x] Yes  [] No    Have you been treated for these symptoms before:  [x] Yes  [] No    If a prescription is needed, what is your preferred pharmacy and phone number:      [facility]/pharmacy #[id number] - [place], [place] - [address] - [phone]  - [phone]   [phone]    Additional notes:    PATIENT AND TAI HAVE DISCUSSED THIS AND WANTED TO SEE IF IT WOULD WORK ITSELF OUT BUT IT IS NOT.  PATIENT WANTING MARY GRACE TO PLEASE CALL HER    PLEASE ADVISE

## 2022-03-15 NOTE — TELEPHONE ENCOUNTER
There was no complaint of the left arm during the exam.  I have reviewed x-rays and will be sending a message regarding those today

## 2022-03-15 NOTE — TELEPHONE ENCOUNTER
I have referred to ortho for eval of R shoulder. No hydrocodone.  Continue naproxen or diclofenac but not both, recommend Voltaren gel topical and baclofen as needed. I will refer to another pain mgmt if she desires. But no pain meds here. She is on valium.

## 2022-03-15 NOTE — TELEPHONE ENCOUNTER
"Hub ok to share:    \"the right shoulder x-ray shows a possible recent appearing fracture of the clavicle, I will refer to Ortho for evaluation of this  Thoracic xray normal.  There is severe degenerative disc disease of the cervical spine at levels C5-6 with stable appearance. Also facet degenerative changes. Pt was referred to Dr. Del Castillo for this and saw him once, no showed for procedure and was dismissed due to inappropriate UDS. I can try to refer elsewhere for pain mgmt if she wishes.  I have referred to ortho for eval of Rt shoulder. No hydrocodone.  Continue naproxen or diclofenac but not both, recommend Voltaren gel topical and baclofen as needed. I will refer to another pain mgmt if she desires. But no pain meds here. She is on valium.\"  "

## 2022-03-15 NOTE — TELEPHONE ENCOUNTER
Caller: Michell Mckeon    Relationship: Self    Best call back number: [phone] (H)    What orders are you requesting (i.e. lab or imaging): X-RAY LEFT ARM     In what timeframe would the patient need to come in: ASAP     Where will you receive your lab/imaging services: [facility]     Additional notes:      PATIENT JUST HAD A RIGHT ARM X-RAY, BACK AND NECK THIS PAST Friday BUT HER LEFT ARM IS REALLY BOTHERING HER. SHE MENTIONED THIS DURING THE OTHER X-RAYS BUT THEY DID NOT HAVE ORDERS FOR THE LEFT ARM SO THEY COULDN'T TAKE IMAGES OF IT DURING THAT TIME.    SHE COMPLAINS SHE CAN'T LIFT HER ARM WITHOUT BEING IN PAIN, ANY FLEX TO THE BICEP MUSCLES FROM SHOULDER TO ELBOW, SHE'S IN PAIN.     PLEASE ADVISE

## 2022-03-15 NOTE — TELEPHONE ENCOUNTER
I spoke to pt, she asked if you could send a pain cream to her pharmacy that isn't OTC so her insurance will cover it?  I told her that the only other cream I know that you use is through [facility] and it's $30 regardless of insurance.  She also requested a letter for work recommending light duty.  I scheduled an appt for her tomorrow and told her you typically won't do anything like that without an appt.

## 2022-03-16 ENCOUNTER — OFFICE VISIT (OUTPATIENT)
Dept: FAMILY MEDICINE CLINIC | Facility: CLINIC | Age: 62
End: 2022-03-16

## 2022-03-16 VITALS
HEIGHT: 64 IN | WEIGHT: 99 LBS | SYSTOLIC BLOOD PRESSURE: 153 MMHG | DIASTOLIC BLOOD PRESSURE: 89 MMHG | HEART RATE: 95 BPM | BODY MASS INDEX: 16.9 KG/M2 | OXYGEN SATURATION: 98 %

## 2022-03-16 DIAGNOSIS — F41.9 ANXIETY: ICD-10-CM

## 2022-03-16 DIAGNOSIS — M47.812 FACET ARTHRITIS, DEGENERATIVE, CERVICAL SPINE: ICD-10-CM

## 2022-03-16 DIAGNOSIS — M50.30 DEGENERATIVE DISC DISEASE, CERVICAL: ICD-10-CM

## 2022-03-16 DIAGNOSIS — M25.512 ACUTE PAIN OF LEFT SHOULDER: Primary | ICD-10-CM

## 2022-03-16 PROCEDURE — 99214 OFFICE O/P EST MOD 30 MIN: CPT | Performed by: NURSE PRACTITIONER

## 2022-03-16 RX ORDER — NAPROXEN 500 MG/1
500 TABLET ORAL 2 TIMES DAILY WITH MEALS
Qty: 60 TABLET | Refills: 5 | Status: SHIPPED | OUTPATIENT
Start: 2022-03-16

## 2022-03-16 RX ORDER — METHYLPREDNISOLONE 4 MG/1
TABLET ORAL
Qty: 21 TABLET | Refills: 0 | Status: SHIPPED | OUTPATIENT
Start: 2022-03-16 | End: 2022-05-09

## 2022-03-16 RX ORDER — AZELASTINE HYDROCHLORIDE 0.5 MG/ML
SOLUTION/ DROPS OPHTHALMIC
COMMUNITY
Start: 2022-02-20

## 2022-03-16 NOTE — PROGRESS NOTES
Chief Complaint  Arm Pain (Lt, would like to discuss work restrictions.  Pt reports that her Rt shoulder is feeling better. From shoulder to hand, cannot lift her Lt arm.  Pt reports she's having trouble lifting things too.)    Subjective          Michell Mckeon presents to [facility] for   History of Present Illness     Dog pulled the right arm in the past, that particular injury has resolved. There is no known injury to the left arm, she is working now at [email or web address], was lifting, pushing, pulling heavy items at old [employer]. She today reports severe limited mobility of the left shoulder, inability to fully raise it, she is unable to perform her current job duties and is asking to be off work for a few days until symptoms improve some.     She completed x-rays C-spine, T-spine and right shoulder, would like to discuss results:     Cervical spine shows severe degenerative disc disease at levels C5-6 with stable appearance.  Facet degenerative changes.  She has been referred to pain management in the past for this however she no showed an injection appointment and UDS positive for Valium when she was on Klonopin.  She was discharged    Thoracic spine x-ray was normal    Right shoulder x-ray showed posttraumatic degenerative joint disease and a remote clavicle fracture.         The following portions of the patient's history were reviewed and updated as appropriate: allergies, current medications, past family history, past medical history, past social history, past surgical history and problem list.    Past Medical History:   Diagnosis Date   • Anemia    • Anxiety    • Arthritis    • Back pain    • Chronic pain    • DDD (degenerative disc disease), cervical    • Depression    • Excessive daytime sleepiness    • Generalized headaches    • Head injury    • High cholesterol    • IBS (irritable bowel syndrome)    • Insomnia    • Neck pain    • Obstructive sleep apnea    • Osteoarthritis    •  Osteoarthritis    • Osteoporosis    • Restless leg syndrome    • Rheumatoid arthritis (HCC)    • Snoring    • Thyroid disease      Past Surgical History:   Procedure Laterality Date   • BREAST LUMPECTOMY Left     x3   • KNEE SURGERY     • TONSILLECTOMY       Family History   Problem Relation Age of Onset   • Arthritis Mother    • Heart disease Mother    • Hypertension Mother    • Bipolar disorder Son    • OCD Son      Social History     Tobacco Use   • Smoking status: Current Every Day Smoker     Packs/day: 1.00     Years: 40.00     Pack years: 40.00   • Smokeless tobacco: Never Used   Substance Use Topics   • Alcohol use: Yes       Current Outpatient Medications:   •  acyclovir (ZOVIRAX) 400 MG tablet, Take 1 by mouth 5 times daily for 5 days for breakout, otherwise take 1 p.o. daily, Disp: 50 tablet, Rfl: 2  •  atorvastatin (LIPITOR) 20 MG tablet, TAKE 1 TABLET BY MOUTH EVERYDAY AT BEDTIME, Disp: 90 tablet, Rfl: 3  •  azelastine (OPTIVAR) 0.05 % ophthalmic solution, INSTILL 1 DROP INTO BOTH EYES TWICE A DAY, Disp: , Rfl:   •  baclofen (LIORESAL) 10 MG tablet, Take 1 tablet by mouth 3 (Three) Times a Day As Needed for Muscle Spasms., Disp: 60 tablet, Rfl: 2  •  busPIRone (BUSPAR) 15 MG tablet, TAKE 1 TABLET BY MOUTH TWICE A DAY, Disp: 60 tablet, Rfl: 2  •  Cariprazine HCl (Vraylar) 6 MG capsule, Take 1 capsule by mouth Daily., Disp: 90 capsule, Rfl: 1  •  diazePAM (Valium) 5 MG tablet, Take 1/2 to 1 tablet by mouth every 12 hours as needed for breakthrough panic/anxiety (30 day supply), Disp: 40 tablet, Rfl: 2  •  Diclofenac Sodium (VOLTAREN) 1 % gel gel, APPLY 4 G TOPICALLY TO THE APPROPRIATE AREA AS DIRECTED 4 (FOUR) TIMES A DAY AS NEEDED (PAIN)., Disp: 200 g, Rfl: 1  •  diphenoxylate-atropine (Lomotil) 2.5-0.025 MG per tablet, Take 1 tablet by mouth 4 (Four) Times a Day As Needed for Diarrhea., Disp: 30 tablet, Rfl: 0  •  levothyroxine (SYNTHROID, LEVOTHROID) 137 MCG tablet, Take 1 tablet by mouth Daily., Disp:  "90 tablet, Rfl: 1  •  naproxen (NAPROSYN) 500 MG tablet, Take 1 tablet by mouth 2 (Two) Times a Day With Meals., Disp: 60 tablet, Rfl: 5  •  omeprazole (priLOSEC) 40 MG capsule, Take 1 capsule by mouth Daily., Disp: 90 capsule, Rfl: 1  •  traZODone (DESYREL) 300 MG tablet, Take 1 tablet by mouth At Night As Needed for Sleep., Disp: 90 tablet, Rfl: 1  •  methylPREDNISolone (MEDROL) 4 MG dose pack, Take as directed on package instructions., Disp: 21 tablet, Rfl: 0    Objective   Vital Signs:   /89 (BP Location: Right arm, Patient Position: Sitting, Cuff Size: Small Adult)   Pulse 95   Ht 162.6 cm (64.02\")   Wt 44.9 kg (99 lb)   SpO2 98%   BMI 16.98 kg/m²       Physical Exam  Vitals and nursing note reviewed.   Constitutional:       General: She is not in acute distress.     Appearance: She is well-developed. She is not diaphoretic.   Eyes:      Pupils: Pupils are equal, round, and reactive to light.   Neck:      Thyroid: No thyromegaly.      Vascular: No JVD.   Musculoskeletal:         General: Tenderness (severe melchor rom of L shoulder, unable to lift >90 degrees, has mod ttp and mod limited extension, flexion and rotation. C-spine ttp into the left scapula. ) present. Normal range of motion.      Cervical back: Normal range of motion and neck supple.   Skin:     General: Skin is warm and dry.   Neurological:      Mental Status: She is alert and oriented to person, place, and time.      Sensory: No sensory deficit.   Psychiatric:         Behavior: Behavior normal.         Thought Content: Thought content normal.         Judgment: Judgment normal.          Result Review :     No visits with results within 7 Day(s) from this visit.   Latest known visit with results is:   Office Visit on 03/02/2022   Component Date Value Ref Range Status   • Total Cholesterol 03/02/2022 197  0 - 200 mg/dL Final   • Triglycerides 03/02/2022 158 (A) 0 - 150 mg/dL Final   • HDL Cholesterol 03/02/2022 107 (A) 40 - 60 mg/dL Final "   • LDL Cholesterol  03/02/2022 65  0 - 100 mg/dL Final   • VLDL Cholesterol 03/02/2022 25  5 - 40 mg/dL Final   • LDL/HDL Ratio 03/02/2022 0.55   Final   • Glucose 03/02/2022 112 (A) 65 - 99 mg/dL Final   • BUN 03/02/2022 17  8 - 23 mg/dL Final   • Creatinine 03/02/2022 0.75  0.57 - 1.00 mg/dL Final   • Sodium 03/02/2022 141  136 - 145 mmol/L Final   • Potassium 03/02/2022 4.4  3.5 - 5.2 mmol/L Final   • Chloride 03/02/2022 106  98 - 107 mmol/L Final   • CO2 03/02/2022 22.8  22.0 - 29.0 mmol/L Final   • Calcium 03/02/2022 9.5  8.6 - 10.5 mg/dL Final   • Total Protein 03/02/2022 7.1  6.0 - 8.5 g/dL Final   • Albumin 03/02/2022 4.30  3.50 - 5.20 g/dL Final   • ALT (SGPT) 03/02/2022 30  1 - 33 U/L Final   • AST (SGOT) 03/02/2022 34 (A) 1 - 32 U/L Final   • Alkaline Phosphatase 03/02/2022 68  39 - 117 U/L Final   • Total Bilirubin 03/02/2022 0.4  0.0 - 1.2 mg/dL Final   • Globulin 03/02/2022 2.8  gm/dL Final   • A/G Ratio 03/02/2022 1.5  g/dL Final   • BUN/Creatinine Ratio 03/02/2022 22.7  7.0 - 25.0 Final   • Anion Gap 03/02/2022 12.2  5.0 - 15.0 mmol/L Final   • eGFR 03/02/2022 90.7  >60.0 mL/min/1.73 Final    National Kidney Foundation and American Society of Nephrology (ASN) Task Force recommended calculation based on the Chronic Kidney Disease Epidemiology Collaboration (CKD-EPI) equation refit without adjustment for race.   • WBC 03/02/2022 5.80  3.40 - 10.80 10*3/mm3 Final   • RBC 03/02/2022 4.39  3.77 - 5.28 10*6/mm3 Final   • Hemoglobin 03/02/2022 13.6  12.0 - 15.9 g/dL Final   • Hematocrit 03/02/2022 40.4  34.0 - 46.6 % Final   • MCV 03/02/2022 92.0  79.0 - 97.0 fL Final   • MCH 03/02/2022 31.0  26.6 - 33.0 pg Final   • MCHC 03/02/2022 33.7  31.5 - 35.7 g/dL Final   • RDW 03/02/2022 12.8  12.3 - 15.4 % Final   • RDW-SD 03/02/2022 43.0  37.0 - 54.0 fl Final   • MPV 03/02/2022 11.5  6.0 - 12.0 fL Final   • Platelets 03/02/2022 142  140 - 450 10*3/mm3 Final   • TSH 03/02/2022 0.981  0.270 - 4.200 uIU/mL Final    • Lipase 03/02/2022 26  13 - 60 U/L Final                       Assessment and Plan    Diagnoses and all orders for this visit:    1. Acute pain of left shoulder (Primary)  -     XR Shoulder 2+ View Left; Future    2. Facet arthritis, degenerative, cervical spine    3. Degenerative disc disease, cervical    4. Anxiety    Other orders  -     naproxen (NAPROSYN) 500 MG tablet; Take 1 tablet by mouth 2 (Two) Times a Day With Meals.  Dispense: 60 tablet; Refill: 5  -     methylPREDNISolone (MEDROL) 4 MG dose pack; Take as directed on package instructions.  Dispense: 21 tablet; Refill: 0      -Start with xray, consider referral to Dr. Adrian for this as well if needed.   -Consider referral to another pain mgmt for possible facet injections and/or EM.   -Ordered rx alt compounded pain cream.   -No narcotics, pt on valium.  Increase naproxen to twice daily, Medrol Dosepak, continue baclofen as needed.  Stop diclofenac po, cont Voltaren gel until Rx compound cream arrives  -Rest, ice, okay to return to work on Monday      I spent 30 minutes caring for Michell Mckeon on this date of service. This time includes time spent by me in the following activities: preparing for the visit, reviewing tests, performing a medically appropriate examination and/or evaluation , counseling and educating the patient/family/caregiver, ordering medications, tests, or procedures and documenting information in the medical record        Follow Up     Return if symptoms worsen or fail to improve.  Patient was given instructions and counseling regarding her condition or for health maintenance advice. Please see specific information pulled into the AVS if appropriate.        Part of this note may be an electronic transcription/translation of spoken language to printed text using the Dragon Dictation System

## 2022-03-17 ENCOUNTER — TELEPHONE (OUTPATIENT)
Dept: FAMILY MEDICINE CLINIC | Facility: CLINIC | Age: 62
End: 2022-03-17

## 2022-03-17 NOTE — TELEPHONE ENCOUNTER
Caller: Michell Mckeon    Relationship: Self    Best call back number: [phone]     What is the best time to reach you: ANYTIME    Who are you requesting to speak with (clinical staff, provider,  specific staff member): CLINICAL    What was the call regarding: PATIENT CALLING WANTING TO KNOW WHAT DAY [employer]ENNIL[employer] VELA STATED SHE COULD GO BACK TO WORK SHE STATED THE NOTE SAYS 03/21/22 SHE STATED THAT IS THE DAY SHE HAS HER APPOINTMENT WITH ORTHO SHE WOULD LIKE TO HAVE THIS EXTENDED    Do you require a callback: YES

## 2022-03-17 NOTE — TELEPHONE ENCOUNTER
Pt presented to office today to ask if her work note can be extended to include Monday for time off.  I explained to her several times before she presented to office that you'll likely not do this because this is the day she is scheduled with ortho and she should get a work excuse from them, also that she needed to complete xray order before any other letters were written.  In person at the office today, she reports that [facility] will consider her a no-call, no-show if she doesn't give them an excuse before Monday. I asked if that's the case even if she calls in and she said yes.  I told her I would ask you and I would let you know she had xray today so I will look out for results.  Will you be on your computer tomorrow at all if they are received tomorrow?

## 2022-03-17 NOTE — TELEPHONE ENCOUNTER
I have provided a note for this week. If she has a note from ortho on Monday at the appt, her work should be able to accept that. I can not write a note for someone and a date that I am not seeing her for. She should go to work, then go to apt and get the note for employer.
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
## 2022-03-18 ENCOUNTER — TELEPHONE (OUTPATIENT)
Dept: FAMILY MEDICINE CLINIC | Facility: CLINIC | Age: 62
End: 2022-03-18

## 2022-03-18 DIAGNOSIS — M25.512 ARTHRALGIA OF LEFT ACROMIOCLAVICULAR JOINT: Primary | ICD-10-CM

## 2022-03-21 ENCOUNTER — OFFICE VISIT (OUTPATIENT)
Dept: ORTHOPEDIC SURGERY | Facility: CLINIC | Age: 62
End: 2022-03-21

## 2022-03-21 VITALS
HEART RATE: 90 BPM | BODY MASS INDEX: 16.9 KG/M2 | SYSTOLIC BLOOD PRESSURE: 122 MMHG | HEIGHT: 64 IN | DIASTOLIC BLOOD PRESSURE: 86 MMHG | WEIGHT: 99 LBS

## 2022-03-21 DIAGNOSIS — M25.512 LEFT SHOULDER PAIN, UNSPECIFIED CHRONICITY: Primary | ICD-10-CM

## 2022-03-21 PROCEDURE — 99214 OFFICE O/P EST MOD 30 MIN: CPT | Performed by: ORTHOPAEDIC SURGERY

## 2022-03-21 NOTE — PATIENT INSTRUCTIONS
MRI follow-up instructions    Today at your office visit, Dr. Adrian recommended an MRI (magnetic resonance imaging) to evaluate your joint pain.  This requires a precertification process, which our office will do, and then we will contact you when it is approved and go over scheduling options.  We typically recommend these to be performed at [facility] or [facility].  If for some reason it is performed elsewhere please arrange to have that facility give you a disc with your images on it so Dr. Adrian can review it at your follow-up visit.    When checking out today we recommend making an appointment to go over your results in approximately two weeks.  If your MRI is done sooner than that we would be happy to schedule you sooner to go over your results, just contact us at [phone] or through the [facility] portal to let us know your MRI is completed.  Seeing you in person for the results gives us the best opportunity to look at your images together and explain the diagnosis and treatment options to best help you.

## 2022-03-21 NOTE — PROGRESS NOTES
Patient ID: Michell Mckeon is a 61 y.o. female.    Chief Complaint:    Chief Complaint   Patient presents with   • Right Clavicle - Initial Evaluation, Pain     Pain 7        HPI:  This is a 61-year-old female here with bilateral shoulder pain.  She was doing some heavy lifting and pushing and developed bilateral shoulder pain.  The right side has improved but she still has pain and weakness deep in the left shoulder.  It hurts over the impingement area she cannot lift her arm forward to her job at work.  She developed the symptoms about 2 months ago and is try to do physical therapy Dickel medication without relief since that time  Past Medical History:   Diagnosis Date   • Anemia    • Anxiety    • Arthritis    • Back pain    • Chronic pain    • DDD (degenerative disc disease), cervical    • Depression    • Excessive daytime sleepiness    • Generalized headaches    • Head injury    • High cholesterol    • IBS (irritable bowel syndrome)    • Insomnia    • Neck pain    • Obstructive sleep apnea    • Osteoarthritis    • Osteoarthritis    • Osteoporosis    • Restless leg syndrome    • Rheumatoid arthritis (HCC)    • Snoring    • Thyroid disease        Past Surgical History:   Procedure Laterality Date   • BREAST LUMPECTOMY Left     x3   • KNEE SURGERY     • TONSILLECTOMY         Family History   Problem Relation Age of Onset   • Arthritis Mother    • Heart disease Mother    • Hypertension Mother    • Bipolar disorder Son    • OCD Son           Social History     Occupational History   • Not on file   Tobacco Use   • Smoking status: Current Every Day Smoker     Packs/day: 1.00     Years: 40.00     Pack years: 40.00   • Smokeless tobacco: Never Used   Vaping Use   • Vaping Use: Never used   Substance and Sexual Activity   • Alcohol use: Yes   • Drug use: Not Currently     Types: Marijuana     Comment: in the past for pain    • Sexual activity: Defer      Review of Systems   Cardiovascular: Negative for chest pain.  "  Musculoskeletal: Positive for arthralgias.       Objective:    /86   Pulse 90   Ht 162.6 cm (64.02\")   Wt 44.9 kg (99 lb)   BMI 16.98 kg/m²     Physical Examination:  Both shoulders are examined there are no areas of tenderness passive elevation on the right is 170 abduction 140 external rotation 50 internal rotation S1 with negative Speed, Parmer, supraspinatus test.  Left shoulder passive elevation 150 abduction 120 external rotation 30 internal rotation S1 with pain and weakness on Speed, Parmer, supraspinatus testing.  Sensory and motor exam are intact all distributions. Radial pulse is palpable and capillary refill is less than two seconds to all digits.    Imaging:  Previous x-rays are reviewed there is hypertrophy of the distal clavicle on the right side with some fragmentation at the AC joint no fracture well-maintained joint spaces of the glenohumeral joint, left shoulder demonstrates well-maintained joint spaces    Assessment:  Right shoulder pain improving  Left shoulder pain possible cuff tear    Plan:  I recommend MRI      Procedures         Disclaimer: Part of this note may be an electronic transcription/translation of spoken language to printed text using the Dragon Dictation System  "

## 2022-03-29 ENCOUNTER — TELEPHONE (OUTPATIENT)
Dept: FAMILY MEDICINE CLINIC | Facility: CLINIC | Age: 62
End: 2022-03-29

## 2022-03-29 NOTE — TELEPHONE ENCOUNTER
I spoke to pt today who said she may be evicted and has been more stressed than usual, so she has taken all of her Valium.  She asked if you would authorize an early refill or increase the dose or frequency?  I told her you're out of the office until next week, so may not hear something until then, and I didn't forward this to Yennifer because of the nature of the request.  Thank you.   bilateral corneal implants

## 2022-04-08 RX ORDER — BUSPIRONE HYDROCHLORIDE 15 MG/1
TABLET ORAL
Qty: 60 TABLET | Refills: 2 | Status: SHIPPED | OUTPATIENT
Start: 2022-04-08 | End: 2022-11-09

## 2022-04-11 ENCOUNTER — TELEPHONE (OUTPATIENT)
Dept: FAMILY MEDICINE CLINIC | Facility: CLINIC | Age: 62
End: 2022-04-11

## 2022-04-11 NOTE — TELEPHONE ENCOUNTER
Caller: Michell Mckeon    Relationship: Self    Best call back number: [phone]    What is the best time to reach you: ANYTIME    Who are you requesting to speak with (clinical staff, provider,  specific staff member): CLINICAL    Do you know the name of the person who called: MARY GRACE    What was the call regarding: PATIENT NEEDS TO BE RELEASED TO FULL DUTY    Do you require a callback: YES  ASAP

## 2022-04-13 ENCOUNTER — TELEPHONE (OUTPATIENT)
Dept: ORTHOPEDIC SURGERY | Facility: CLINIC | Age: 62
End: 2022-04-13

## 2022-04-13 NOTE — TELEPHONE ENCOUNTER
Caller: SARINA     Relationship: SELF     Best call back number: [phone]    What form or medical record are you requesting: STATES SHE NEEDS PAPERWORK STATING ALL RESTRICTIONS ARE LIFTED SO SHE CAN GO BACK TO WORK     Who is requesting this form or medical record from you: PATIENTS EMPLOYMENT     How would you like to receive the form or medical records (pick-up, mail, fax): MAIL   [address] [place] [id number]

## 2022-04-13 NOTE — TELEPHONE ENCOUNTER
She needs to discuss with him also. If pain resolved I am ok with her returning to full duty if okay with Dr. Adrian.

## 2022-04-15 ENCOUNTER — TELEPHONE (OUTPATIENT)
Dept: FAMILY MEDICINE CLINIC | Facility: CLINIC | Age: 62
End: 2022-04-15

## 2022-04-15 NOTE — TELEPHONE ENCOUNTER
Patient called back to the office stating she is not having her MRI and needs a note to return to work.  Can I give her a letter to return to work?

## 2022-04-15 NOTE — TELEPHONE ENCOUNTER
Patient called in this morning approx 9:00. She says that she can't sit still, feels like she is taking baby step, can't elax, and feels her meds aren't working. She named off her buspar, valium, and vraylar. Her tone was panicked. I told her that I would send provider a message, as provider is not in the office on Friday. I also suggested ED if she felt the need. She reused ED and said would wait to hear back from provider. Please advice.

## 2022-04-15 NOTE — TELEPHONE ENCOUNTER
She needs to continue the meds as directed, she should call and try to get in to psychiatry also. If she is in danger or harm and not getting relief with meds, I agree she should go to the ED. She should not take any more than prescribed.

## 2022-04-22 ENCOUNTER — APPOINTMENT (OUTPATIENT)
Dept: MRI IMAGING | Facility: HOSPITAL | Age: 62
End: 2022-04-22

## 2022-05-09 ENCOUNTER — OFFICE VISIT (OUTPATIENT)
Dept: FAMILY MEDICINE CLINIC | Facility: CLINIC | Age: 62
End: 2022-05-09

## 2022-05-09 VITALS
WEIGHT: 101.6 LBS | HEIGHT: 64 IN | BODY MASS INDEX: 17.34 KG/M2 | DIASTOLIC BLOOD PRESSURE: 76 MMHG | SYSTOLIC BLOOD PRESSURE: 138 MMHG | OXYGEN SATURATION: 97 % | HEART RATE: 82 BPM

## 2022-05-09 DIAGNOSIS — F31.4 BIPOLAR DISORDER, CURRENT EPISODE DEPRESSED, SEVERE, WITHOUT PSYCHOTIC FEATURES: Primary | ICD-10-CM

## 2022-05-09 DIAGNOSIS — F41.9 ANXIETY: ICD-10-CM

## 2022-05-09 PROCEDURE — 99213 OFFICE O/P EST LOW 20 MIN: CPT | Performed by: NURSE PRACTITIONER

## 2022-05-09 RX ORDER — DIAZEPAM 5 MG/1
TABLET ORAL
Qty: 40 TABLET | Refills: 2 | Status: SHIPPED | OUTPATIENT
Start: 2022-05-09 | End: 2022-08-16 | Stop reason: SDUPTHER

## 2022-05-09 RX ORDER — DESVENLAFAXINE SUCCINATE 50 MG/1
50 TABLET, EXTENDED RELEASE ORAL DAILY
Qty: 30 TABLET | Refills: 1 | Status: SHIPPED | OUTPATIENT
Start: 2022-05-09 | End: 2022-06-20 | Stop reason: SDUPTHER

## 2022-05-09 NOTE — PROGRESS NOTES
Chief Complaint  Chief Complaint   Patient presents with   • Anxiety     A nervous tick, pt reports that she's constantly rocking back and forth, jaw is constantly moving, and she's always got to be moving.  She is worried it might be parkinson's?     • Results     She also wants to go over the back xrays again.            Subjective          Michell Mckeon presents to [facility] for   History of Present Illness     Bipolar depression, patient has been on and off of Vraylar over the past 1 to 2 years, over the last 4 to 6 months she has been taking 6 mg regularly and now experiencing side effect of EPS, shaking, rocking, jaw tremor, can't stand still.  She stopped Vraylar 4 days ago. She is being evicted, tearful and manic, anxiety is not controlled, using buspar prn.  She reports Valium not effective anymore and rarely using        The following portions of the patient's history were reviewed and updated as appropriate: allergies, current medications, past family history, past medical history, past social history, past surgical history and problem list.    Past Medical History:   Diagnosis Date   • Anemia    • Anxiety    • Arthritis    • Back pain    • Chronic pain    • DDD (degenerative disc disease), cervical    • Depression    • Excessive daytime sleepiness    • Generalized headaches    • Head injury    • High cholesterol    • IBS (irritable bowel syndrome)    • Insomnia    • Neck pain    • Obstructive sleep apnea    • Osteoarthritis    • Osteoarthritis    • Osteoporosis    • Restless leg syndrome    • Rheumatoid arthritis (HCC)    • Snoring    • Thyroid disease      Past Surgical History:   Procedure Laterality Date   • BREAST LUMPECTOMY Left     x3   • KNEE SURGERY     • TONSILLECTOMY       Family History   Problem Relation Age of Onset   • Arthritis Mother    • Heart disease Mother    • Hypertension Mother    • Bipolar disorder Son    • OCD Son      Social History     Tobacco  Use   • Smoking status: Current Every Day Smoker     Packs/day: 1.00     Years: 40.00     Pack years: 40.00   • Smokeless tobacco: Never Used   Substance Use Topics   • Alcohol use: Yes       Current Outpatient Medications:   •  acyclovir (ZOVIRAX) 400 MG tablet, Take 1 by mouth 5 times daily for 5 days for breakout, otherwise take 1 p.o. daily, Disp: 50 tablet, Rfl: 2  •  atorvastatin (LIPITOR) 20 MG tablet, TAKE 1 TABLET BY MOUTH EVERYDAY AT BEDTIME, Disp: 90 tablet, Rfl: 3  •  azelastine (OPTIVAR) 0.05 % ophthalmic solution, INSTILL 1 DROP INTO BOTH EYES TWICE A DAY, Disp: , Rfl:   •  baclofen (LIORESAL) 10 MG tablet, Take 1 tablet by mouth 3 (Three) Times a Day As Needed for Muscle Spasms., Disp: 60 tablet, Rfl: 2  •  busPIRone (BUSPAR) 15 MG tablet, TAKE 1 TABLET BY MOUTH TWICE A DAY, Disp: 60 tablet, Rfl: 2  •  diazePAM (Valium) 5 MG tablet, Take 1/2 to 1 tablet by mouth every 12 hours as needed for breakthrough panic/anxiety (30 day supply), Disp: 40 tablet, Rfl: 2  •  Diclofenac Sodium (VOLTAREN) 1 % gel gel, APPLY 4 G TOPICALLY TO THE APPROPRIATE AREA AS DIRECTED 4 (FOUR) TIMES A DAY AS NEEDED (PAIN)., Disp: 200 g, Rfl: 1  •  diphenoxylate-atropine (Lomotil) 2.5-0.025 MG per tablet, Take 1 tablet by mouth 4 (Four) Times a Day As Needed for Diarrhea., Disp: 30 tablet, Rfl: 0  •  levothyroxine (SYNTHROID, LEVOTHROID) 137 MCG tablet, Take 1 tablet by mouth Daily., Disp: 90 tablet, Rfl: 1  •  naproxen (NAPROSYN) 500 MG tablet, Take 1 tablet by mouth 2 (Two) Times a Day With Meals., Disp: 60 tablet, Rfl: 5  •  omeprazole (priLOSEC) 40 MG capsule, Take 1 capsule by mouth Daily., Disp: 90 capsule, Rfl: 1  •  traZODone (DESYREL) 300 MG tablet, Take 1 tablet by mouth At Night As Needed for Sleep., Disp: 90 tablet, Rfl: 1  •  desvenlafaxine (Pristiq) 50 MG 24 hr tablet, Take 1 tablet by mouth Daily., Disp: 30 tablet, Rfl: 1    Objective   Vital Signs:   /76 (BP Location: Left arm, Patient Position: Sitting,  "Cuff Size: Adult)   Pulse 82   Ht 162.6 cm (64.02\")   Wt 46.1 kg (101 lb 9.6 oz)   SpO2 97%   BMI 17.43 kg/m²           Physical Exam  Vitals and nursing note reviewed.   Constitutional:       General: She is not in acute distress.     Appearance: She is well-developed. She is not diaphoretic.   Eyes:      Pupils: Pupils are equal, round, and reactive to light.   Neck:      Thyroid: No thyromegaly.      Vascular: No JVD.   Cardiovascular:      Rate and Rhythm: Normal rate and regular rhythm.      Heart sounds: Normal heart sounds. No murmur heard.  Pulmonary:      Effort: Pulmonary effort is normal. No respiratory distress.      Breath sounds: Normal breath sounds.   Abdominal:      General: Bowel sounds are normal. There is no distension.      Palpations: Abdomen is soft.      Tenderness: There is no abdominal tenderness.   Musculoskeletal:         General: No tenderness. Normal range of motion.      Cervical back: Normal range of motion and neck supple.   Skin:     General: Skin is warm and dry.   Neurological:      Mental Status: She is alert and oriented to person, place, and time.      Sensory: No sensory deficit.      Comments: Abnormal EPS symptoms, rocking in exam chair, sit to stand and shuffling and marching steps.  There is no extremity tremor   Psychiatric:         Attention and Perception: Attention normal.         Mood and Affect: Mood is anxious and depressed. Affect is labile and tearful.         Speech: Speech normal.         Behavior: Behavior normal. Behavior is cooperative.         Thought Content: Thought content is paranoid.         Cognition and Memory: Cognition normal.         Judgment: Judgment normal.          Result Review :     No visits with results within 7 Day(s) from this visit.   Latest known visit with results is:   Office Visit on 03/02/2022   Component Date Value Ref Range Status   • Total Cholesterol 03/02/2022 197  0 - 200 mg/dL Final   • Triglycerides 03/02/2022 158 (A) 0 " - 150 mg/dL Final   • HDL Cholesterol 03/02/2022 107 (A) 40 - 60 mg/dL Final   • LDL Cholesterol  03/02/2022 65  0 - 100 mg/dL Final   • VLDL Cholesterol 03/02/2022 25  5 - 40 mg/dL Final   • LDL/HDL Ratio 03/02/2022 0.55   Final   • Glucose 03/02/2022 112 (A) 65 - 99 mg/dL Final   • BUN 03/02/2022 17  8 - 23 mg/dL Final   • Creatinine 03/02/2022 0.75  0.57 - 1.00 mg/dL Final   • Sodium 03/02/2022 141  136 - 145 mmol/L Final   • Potassium 03/02/2022 4.4  3.5 - 5.2 mmol/L Final   • Chloride 03/02/2022 106  98 - 107 mmol/L Final   • CO2 03/02/2022 22.8  22.0 - 29.0 mmol/L Final   • Calcium 03/02/2022 9.5  8.6 - 10.5 mg/dL Final   • Total Protein 03/02/2022 7.1  6.0 - 8.5 g/dL Final   • Albumin 03/02/2022 4.30  3.50 - 5.20 g/dL Final   • ALT (SGPT) 03/02/2022 30  1 - 33 U/L Final   • AST (SGOT) 03/02/2022 34 (A) 1 - 32 U/L Final   • Alkaline Phosphatase 03/02/2022 68  39 - 117 U/L Final   • Total Bilirubin 03/02/2022 0.4  0.0 - 1.2 mg/dL Final   • Globulin 03/02/2022 2.8  gm/dL Final   • A/G Ratio 03/02/2022 1.5  g/dL Final   • BUN/Creatinine Ratio 03/02/2022 22.7  7.0 - 25.0 Final   • Anion Gap 03/02/2022 12.2  5.0 - 15.0 mmol/L Final   • eGFR 03/02/2022 90.7  >60.0 mL/min/1.73 Final    National Kidney Foundation and American Society of Nephrology (ASN) Task Force recommended calculation based on the Chronic Kidney Disease Epidemiology Collaboration (CKD-EPI) equation refit without adjustment for race.   • WBC 03/02/2022 5.80  3.40 - 10.80 10*3/mm3 Final   • RBC 03/02/2022 4.39  3.77 - 5.28 10*6/mm3 Final   • Hemoglobin 03/02/2022 13.6  12.0 - 15.9 g/dL Final   • Hematocrit 03/02/2022 40.4  34.0 - 46.6 % Final   • MCV 03/02/2022 92.0  79.0 - 97.0 fL Final   • MCH 03/02/2022 31.0  26.6 - 33.0 pg Final   • MCHC 03/02/2022 33.7  31.5 - 35.7 g/dL Final   • RDW 03/02/2022 12.8  12.3 - 15.4 % Final   • RDW-SD 03/02/2022 43.0  37.0 - 54.0 fl Final   • MPV 03/02/2022 11.5  6.0 - 12.0 fL Final   • Platelets 03/02/2022 142  140  - 450 10*3/mm3 Final   • TSH 03/02/2022 0.981  0.270 - 4.200 uIU/mL Final   • Lipase 03/02/2022 26  13 - 60 U/L Final                             Assessment and Plan    Diagnoses and all orders for this visit:    1. Bipolar disorder, current episode depressed, severe, without psychotic features (HCC) (Primary)  Comments:  stop vraylar d/t EPS symptoms. start pristiq, use buspirone and valium prn. to ED if develops suicidal ideation.     2. Anxiety  Comments:  stop vraylar d/t s/e, cont valium and buspirone prn.   Orders:  -     diazePAM (Valium) 5 MG tablet; Take 1/2 to 1 tablet by mouth every 12 hours as needed for breakthrough panic/anxiety (30 day supply)  Dispense: 40 tablet; Refill: 2    Other orders  -     desvenlafaxine (Pristiq) 50 MG 24 hr tablet; Take 1 tablet by mouth Daily.  Dispense: 30 tablet; Refill: 1        I spent 20 minutes caring for Michell Mckeon on this date of service. This time includes time spent by me in the following activities: preparing for the visit, reviewing tests, performing a medically appropriate examination and/or evaluation , counseling and educating the patient/family/caregiver, ordering medications, tests, or procedures and documenting information in the medical record        Follow Up     Return in about 6 weeks (around 6/20/2022) for Recheck.  Patient was given instructions and counseling regarding her condition or for health maintenance advice. Please see specific information pulled into the AVS if appropriate.        Part of this note may be an electronic transcription/translation of spoken language to printed text using the Dragon Dictation System

## 2022-06-10 DIAGNOSIS — G47.09 OTHER INSOMNIA: ICD-10-CM

## 2022-06-10 RX ORDER — TRAZODONE HYDROCHLORIDE 300 MG/1
300 TABLET ORAL NIGHTLY PRN
Qty: 90 TABLET | Refills: 1 | Status: SHIPPED | OUTPATIENT
Start: 2022-06-10

## 2022-06-20 ENCOUNTER — OFFICE VISIT (OUTPATIENT)
Dept: FAMILY MEDICINE CLINIC | Facility: CLINIC | Age: 62
End: 2022-06-20

## 2022-06-20 VITALS
HEART RATE: 88 BPM | HEIGHT: 64 IN | OXYGEN SATURATION: 100 % | DIASTOLIC BLOOD PRESSURE: 76 MMHG | WEIGHT: 95.6 LBS | BODY MASS INDEX: 16.32 KG/M2 | SYSTOLIC BLOOD PRESSURE: 134 MMHG

## 2022-06-20 DIAGNOSIS — M79.672 LEFT FOOT PAIN: ICD-10-CM

## 2022-06-20 DIAGNOSIS — F41.9 ANXIETY: ICD-10-CM

## 2022-06-20 DIAGNOSIS — S99.922A TOE INJURY, LEFT, INITIAL ENCOUNTER: Primary | ICD-10-CM

## 2022-06-20 DIAGNOSIS — F31.4 BIPOLAR DISORDER, CURRENT EPISODE DEPRESSED, SEVERE, WITHOUT PSYCHOTIC FEATURES: ICD-10-CM

## 2022-06-20 DIAGNOSIS — E03.9 ACQUIRED HYPOTHYROIDISM: ICD-10-CM

## 2022-06-20 DIAGNOSIS — E78.2 MIXED HYPERLIPIDEMIA: ICD-10-CM

## 2022-06-20 PROCEDURE — 99213 OFFICE O/P EST LOW 20 MIN: CPT | Performed by: NURSE PRACTITIONER

## 2022-06-20 RX ORDER — LEVOTHYROXINE SODIUM 137 UG/1
TABLET ORAL
Qty: 90 TABLET | Refills: 1 | Status: SHIPPED | OUTPATIENT
Start: 2022-06-20 | End: 2022-11-08

## 2022-06-20 RX ORDER — DESVENLAFAXINE SUCCINATE 50 MG/1
50 TABLET, EXTENDED RELEASE ORAL DAILY
Qty: 90 TABLET | Refills: 1 | Status: SHIPPED | OUTPATIENT
Start: 2022-06-20 | End: 2022-12-15

## 2022-06-20 NOTE — PROGRESS NOTES
"Chief Complaint  Chief Complaint   Patient presents with   • Anxiety     Pt reports that she is having \"nervous attacks\" where she is expc flight or fight, reports butterflies, and that she's out of her comfort zone.   • Follow-up     6 wk   • Foot Pain     Lt foot, toes           Subjective          Michell Mckeon presents to [facility] for   History of Present Illness       Bipolar disorder/anxiety, now on pristiq daily and EPS symptoms have resolved since discontinuation of Vraylar.  She stopped using Valium and buspirone due to both being ineffective.  She reports now only having \"nervous attacks\" 2-3 times/week and typically subsides over 30 to 60 minutes.    Left foot and 2-4 toes injury, bruised and swollen, patient reports she was vacuuming on Saturday, she does not recall an injury or fall. She can only think that maybe she bumped the toes on her iron coffee table.  She is working but having difficulty walking due to toe/foot pain          The following portions of the patient's history were reviewed and updated as appropriate: allergies, current medications, past family history, past medical history, past social history, past surgical history and problem list.    Past Medical History:   Diagnosis Date   • Anemia    • Anxiety    • Arthritis    • Back pain    • Chronic pain    • DDD (degenerative disc disease), cervical    • Depression    • Excessive daytime sleepiness    • Generalized headaches    • Head injury    • High cholesterol    • IBS (irritable bowel syndrome)    • Insomnia    • Neck pain    • Obstructive sleep apnea    • Osteoarthritis    • Osteoarthritis    • Osteoporosis    • Restless leg syndrome    • Rheumatoid arthritis (HCC)    • Snoring    • Thyroid disease      Past Surgical History:   Procedure Laterality Date   • BREAST LUMPECTOMY Left     x3   • KNEE SURGERY     • TONSILLECTOMY       Family History   Problem Relation Age of Onset   • Arthritis Mother    • " Heart disease Mother    • Hypertension Mother    • Bipolar disorder Son    • OCD Son      Social History     Tobacco Use   • Smoking status: Current Every Day Smoker     Packs/day: 1.00     Years: 40.00     Pack years: 40.00   • Smokeless tobacco: Never Used   Substance Use Topics   • Alcohol use: Yes       Current Outpatient Medications:   •  acyclovir (ZOVIRAX) 400 MG tablet, Take 1 by mouth 5 times daily for 5 days for breakout, otherwise take 1 p.o. daily, Disp: 50 tablet, Rfl: 2  •  atorvastatin (LIPITOR) 20 MG tablet, TAKE 1 TABLET BY MOUTH EVERYDAY AT BEDTIME, Disp: 90 tablet, Rfl: 3  •  azelastine (OPTIVAR) 0.05 % ophthalmic solution, INSTILL 1 DROP INTO BOTH EYES TWICE A DAY, Disp: , Rfl:   •  baclofen (LIORESAL) 10 MG tablet, Take 1 tablet by mouth 3 (Three) Times a Day As Needed for Muscle Spasms., Disp: 60 tablet, Rfl: 2  •  busPIRone (BUSPAR) 15 MG tablet, TAKE 1 TABLET BY MOUTH TWICE A DAY, Disp: 60 tablet, Rfl: 2  •  desvenlafaxine (Pristiq) 50 MG 24 hr tablet, Take 1 tablet by mouth Daily., Disp: 90 tablet, Rfl: 1  •  diazePAM (Valium) 5 MG tablet, Take 1/2 to 1 tablet by mouth every 12 hours as needed for breakthrough panic/anxiety (30 day supply), Disp: 40 tablet, Rfl: 2  •  Diclofenac Sodium (VOLTAREN) 1 % gel gel, APPLY 4 G TOPICALLY TO THE APPROPRIATE AREA AS DIRECTED 4 (FOUR) TIMES A DAY AS NEEDED (PAIN)., Disp: 200 g, Rfl: 1  •  diphenoxylate-atropine (Lomotil) 2.5-0.025 MG per tablet, Take 1 tablet by mouth 4 (Four) Times a Day As Needed for Diarrhea., Disp: 30 tablet, Rfl: 0  •  levothyroxine (SYNTHROID, LEVOTHROID) 137 MCG tablet, TAKE 1 TABLET BY MOUTH EVERY DAY, Disp: 90 tablet, Rfl: 1  •  naproxen (NAPROSYN) 500 MG tablet, Take 1 tablet by mouth 2 (Two) Times a Day With Meals., Disp: 60 tablet, Rfl: 5  •  omeprazole (priLOSEC) 40 MG capsule, Take 1 capsule by mouth Daily., Disp: 90 capsule, Rfl: 1  •  traZODone (DESYREL) 300 MG tablet, TAKE 1 TABLET BY MOUTH AT NIGHT AS NEEDED FOR  "SLEEP., Disp: 90 tablet, Rfl: 1    Objective   Vital Signs:   /76 (BP Location: Left arm, Patient Position: Sitting, Cuff Size: Adult)   Pulse 88   Ht 162.6 cm (64.02\")   Wt 43.4 kg (95 lb 9.6 oz)   SpO2 100%   BMI 16.40 kg/m²           Physical Exam  Vitals and nursing note reviewed.   Constitutional:       General: She is not in acute distress.     Appearance: She is well-developed. She is not diaphoretic.   Neck:      Thyroid: No thyromegaly.      Vascular: No JVD.   Musculoskeletal:         General: Signs of injury (L 2-4 toes ecchymotic, swollen and tender, poor rom. ) present. No tenderness. Normal range of motion.   Skin:     General: Skin is warm and dry.   Neurological:      Mental Status: She is alert and oriented to person, place, and time.      Sensory: No sensory deficit.   Psychiatric:         Mood and Affect: Mood normal.         Behavior: Behavior normal.         Thought Content: Thought content normal.         Judgment: Judgment normal.      Comments: Calm, pleasant and no EPS symptoms present          Result Review :     No visits with results within 7 Day(s) from this visit.   Latest known visit with results is:   Office Visit on 03/02/2022   Component Date Value Ref Range Status   • Total Cholesterol 03/02/2022 197  0 - 200 mg/dL Final   • Triglycerides 03/02/2022 158 (A) 0 - 150 mg/dL Final   • HDL Cholesterol 03/02/2022 107 (A) 40 - 60 mg/dL Final   • LDL Cholesterol  03/02/2022 65  0 - 100 mg/dL Final   • VLDL Cholesterol 03/02/2022 25  5 - 40 mg/dL Final   • LDL/HDL Ratio 03/02/2022 0.55   Final   • Glucose 03/02/2022 112 (A) 65 - 99 mg/dL Final   • BUN 03/02/2022 17  8 - 23 mg/dL Final   • Creatinine 03/02/2022 0.75  0.57 - 1.00 mg/dL Final   • Sodium 03/02/2022 141  136 - 145 mmol/L Final   • Potassium 03/02/2022 4.4  3.5 - 5.2 mmol/L Final   • Chloride 03/02/2022 106  98 - 107 mmol/L Final   • CO2 03/02/2022 22.8  22.0 - 29.0 mmol/L Final   • Calcium 03/02/2022 9.5  8.6 - 10.5 " mg/dL Final   • Total Protein 03/02/2022 7.1  6.0 - 8.5 g/dL Final   • Albumin 03/02/2022 4.30  3.50 - 5.20 g/dL Final   • ALT (SGPT) 03/02/2022 30  1 - 33 U/L Final   • AST (SGOT) 03/02/2022 34 (A) 1 - 32 U/L Final   • Alkaline Phosphatase 03/02/2022 68  39 - 117 U/L Final   • Total Bilirubin 03/02/2022 0.4  0.0 - 1.2 mg/dL Final   • Globulin 03/02/2022 2.8  gm/dL Final   • A/G Ratio 03/02/2022 1.5  g/dL Final   • BUN/Creatinine Ratio 03/02/2022 22.7  7.0 - 25.0 Final   • Anion Gap 03/02/2022 12.2  5.0 - 15.0 mmol/L Final   • eGFR 03/02/2022 90.7  >60.0 mL/min/1.73 Final    National Kidney Foundation and American Society of Nephrology (ASN) Task Force recommended calculation based on the Chronic Kidney Disease Epidemiology Collaboration (CKD-EPI) equation refit without adjustment for race.   • WBC 03/02/2022 5.80  3.40 - 10.80 10*3/mm3 Final   • RBC 03/02/2022 4.39  3.77 - 5.28 10*6/mm3 Final   • Hemoglobin 03/02/2022 13.6  12.0 - 15.9 g/dL Final   • Hematocrit 03/02/2022 40.4  34.0 - 46.6 % Final   • MCV 03/02/2022 92.0  79.0 - 97.0 fL Final   • MCH 03/02/2022 31.0  26.6 - 33.0 pg Final   • MCHC 03/02/2022 33.7  31.5 - 35.7 g/dL Final   • RDW 03/02/2022 12.8  12.3 - 15.4 % Final   • RDW-SD 03/02/2022 43.0  37.0 - 54.0 fl Final   • MPV 03/02/2022 11.5  6.0 - 12.0 fL Final   • Platelets 03/02/2022 142  140 - 450 10*3/mm3 Final   • TSH 03/02/2022 0.981  0.270 - 4.200 uIU/mL Final   • Lipase 03/02/2022 26  13 - 60 U/L Final                             Assessment and Plan    Diagnoses and all orders for this visit:    1. Toe injury, left, initial encounter (Primary)  -      Walking Boot, Pneumatic & / or Vacuum    2. Left foot pain  -      Walking Boot, Pneumatic & / or Vacuum    3. Anxiety    4. Bipolar disorder, current episode depressed, severe, without psychotic features (HCC)    5. Acquired hypothyroidism    6. Mixed hyperlipidemia    Other orders  -     desvenlafaxine (Pristiq) 50 MG 24 hr tablet; Take  1 tablet by mouth Daily.  Dispense: 90 tablet; Refill: 1      Declines x-ray of the left foot  Recommend rest, ice and use naproxen twice daily  Ordered pneumatic walking boot to [place] to offload pressure from the toes  For occasional nervous attacks/anxiety, recommend start buspirone once daily for prevention, continue Pristiq.     I spent 25 minutes caring for Michell Mckeon on this date of service. This time includes time spent by me in the following activities: preparing for the visit, reviewing tests, performing a medically appropriate examination and/or evaluation , counseling and educating the patient/family/caregiver, ordering medications, tests, or procedures and documenting information in the medical record        Follow Up     Return in about 3 months (around 9/20/2022) for Recheck.  Patient was given instructions and counseling regarding her condition or for health maintenance advice. Please see specific information pulled into the AVS if appropriate.        Part of this note may be an electronic transcription/translation of spoken language to printed text using the Dragon Dictation System

## 2022-06-25 DIAGNOSIS — M50.30 DEGENERATION OF INTERVERTEBRAL DISC OF CERVICAL REGION WITH OSTEOPHYTE OF CERVICAL VERTEBRA: ICD-10-CM

## 2022-06-25 DIAGNOSIS — M25.78 DEGENERATION OF INTERVERTEBRAL DISC OF CERVICAL REGION WITH OSTEOPHYTE OF CERVICAL VERTEBRA: ICD-10-CM

## 2022-06-27 RX ORDER — BACLOFEN 10 MG/1
TABLET ORAL
Qty: 60 TABLET | Refills: 2 | Status: SHIPPED | OUTPATIENT
Start: 2022-06-27

## 2022-07-29 DIAGNOSIS — R19.7 DIARRHEA, UNSPECIFIED TYPE: ICD-10-CM

## 2022-07-29 RX ORDER — DIPHENOXYLATE HYDROCHLORIDE AND ATROPINE SULFATE 2.5; .025 MG/1; MG/1
TABLET ORAL
Qty: 30 TABLET | Refills: 0 | Status: SHIPPED | OUTPATIENT
Start: 2022-07-29 | End: 2022-08-22

## 2022-07-29 RX ORDER — ACYCLOVIR 400 MG/1
TABLET ORAL
Qty: 50 TABLET | Refills: 2 | Status: SHIPPED | OUTPATIENT
Start: 2022-07-29 | End: 2023-01-16

## 2022-08-12 ENCOUNTER — TELEPHONE (OUTPATIENT)
Dept: FAMILY MEDICINE CLINIC | Facility: CLINIC | Age: 62
End: 2022-08-12

## 2022-08-12 NOTE — TELEPHONE ENCOUNTER
Pt called in with complaints of pain under left side arm pit by boob, and real intense pain in head.  They are not consistent and does not occur all the time.  Pt wanted to know if a CT could be ordered or if she needed to make an appt to be seen.  Pt stated she wont go to ED.  Please advise

## 2022-08-16 DIAGNOSIS — F41.9 ANXIETY: ICD-10-CM

## 2022-08-16 RX ORDER — DIAZEPAM 5 MG/1
TABLET ORAL
Qty: 40 TABLET | Refills: 2 | Status: SHIPPED | OUTPATIENT
Start: 2022-08-16 | End: 2022-12-05

## 2022-08-21 DIAGNOSIS — R19.7 DIARRHEA, UNSPECIFIED TYPE: ICD-10-CM

## 2022-08-22 RX ORDER — DIPHENOXYLATE HYDROCHLORIDE AND ATROPINE SULFATE 2.5; .025 MG/1; MG/1
TABLET ORAL
Qty: 30 TABLET | Refills: 0 | Status: SHIPPED | OUTPATIENT
Start: 2022-08-22 | End: 2022-09-09

## 2022-08-23 ENCOUNTER — TELEPHONE (OUTPATIENT)
Dept: FAMILY MEDICINE CLINIC | Facility: CLINIC | Age: 62
End: 2022-08-23

## 2022-08-23 NOTE — TELEPHONE ENCOUNTER
PATIENT IS CALLING IN TO [facility], THAT THE PHARMACY IS REQUESTING A PRIOR AUTHORIZATION,  THE MEDICATION IS diazePAM (Valium) 5 MG tablet.  [facility] HAS SENT A REQUEST OVER AS WELL.    PLEASE [facility]     [phone]

## 2022-08-23 NOTE — TELEPHONE ENCOUNTER
I spoke to pt and let her know that PA was denied and she'll just need to pay out of pocket.  I left a vm for pharmacy to refill when they can, that pt will just pay out of pocket.

## 2022-08-26 DIAGNOSIS — K21.9 GASTROESOPHAGEAL REFLUX DISEASE WITHOUT ESOPHAGITIS: ICD-10-CM

## 2022-08-26 RX ORDER — OMEPRAZOLE 40 MG/1
CAPSULE, DELAYED RELEASE ORAL
Qty: 90 CAPSULE | Refills: 1 | Status: SHIPPED | OUTPATIENT
Start: 2022-08-26 | End: 2023-01-24 | Stop reason: SDUPTHER

## 2022-09-08 DIAGNOSIS — R19.7 DIARRHEA, UNSPECIFIED TYPE: ICD-10-CM

## 2022-09-09 RX ORDER — DIPHENOXYLATE HYDROCHLORIDE AND ATROPINE SULFATE 2.5; .025 MG/1; MG/1
TABLET ORAL
Qty: 30 TABLET | Refills: 0 | Status: SHIPPED | OUTPATIENT
Start: 2022-09-09 | End: 2022-09-30

## 2022-09-29 DIAGNOSIS — R19.7 DIARRHEA, UNSPECIFIED TYPE: ICD-10-CM

## 2022-09-30 RX ORDER — DIPHENOXYLATE HYDROCHLORIDE AND ATROPINE SULFATE 2.5; .025 MG/1; MG/1
TABLET ORAL
Qty: 30 TABLET | Refills: 2 | Status: SHIPPED | OUTPATIENT
Start: 2022-09-30 | End: 2022-11-14

## 2022-10-11 ENCOUNTER — TELEPHONE (OUTPATIENT)
Dept: FAMILY MEDICINE CLINIC | Facility: CLINIC | Age: 62
End: 2022-10-11

## 2022-10-11 NOTE — TELEPHONE ENCOUNTER
Pt called in with concern of RA that she says is now in her right hand on same knuckle as left.  Pt states the specialist that provider referred pt to are to far away, one in [facility] and other in [place] and will not go to [place].  Knows there aren't very many specialist take her insurance.  Pt wanted to know if provider would consider prescribing something for arthritis?  Please advise

## 2022-10-12 NOTE — TELEPHONE ENCOUNTER
Attempted to call pt with providers response no answer:  Per verbal only left msg for pt to call office

## 2022-10-12 NOTE — TELEPHONE ENCOUNTER
Pt called in, read response from provider.  Pt stated using the compound cream helps.  Pt stated we call the insurance and send the referral and she goes to that Dr.  I advised pt that we send the referral, however if it is somewhere to far she should call her insurance to find a provider in the area she is wanting, pt again said office usually calls insurance for her.  Pt scheduled appt 11/7.

## 2022-11-03 ENCOUNTER — TELEPHONE (OUTPATIENT)
Dept: FAMILY MEDICINE CLINIC | Facility: CLINIC | Age: 62
End: 2022-11-03

## 2022-11-03 NOTE — TELEPHONE ENCOUNTER
Pt is calling and asking for her compounded cream she is almost out. She said she gets this through [facility]. Is this okay to complete for the patient?

## 2022-11-07 ENCOUNTER — OFFICE VISIT (OUTPATIENT)
Dept: FAMILY MEDICINE CLINIC | Facility: CLINIC | Age: 62
End: 2022-11-07

## 2022-11-07 VITALS
WEIGHT: 95.4 LBS | SYSTOLIC BLOOD PRESSURE: 138 MMHG | BODY MASS INDEX: 16.29 KG/M2 | HEART RATE: 80 BPM | DIASTOLIC BLOOD PRESSURE: 83 MMHG | TEMPERATURE: 98.9 F | OXYGEN SATURATION: 97 % | HEIGHT: 64 IN

## 2022-11-07 DIAGNOSIS — M79.641 BILATERAL HAND PAIN: Primary | ICD-10-CM

## 2022-11-07 DIAGNOSIS — F41.9 ANXIETY: ICD-10-CM

## 2022-11-07 DIAGNOSIS — M79.642 BILATERAL HAND PAIN: Primary | ICD-10-CM

## 2022-11-07 DIAGNOSIS — G47.09 OTHER INSOMNIA: ICD-10-CM

## 2022-11-07 DIAGNOSIS — K21.9 GASTROESOPHAGEAL REFLUX DISEASE WITHOUT ESOPHAGITIS: ICD-10-CM

## 2022-11-07 DIAGNOSIS — M50.30 DEGENERATIVE DISC DISEASE, CERVICAL: ICD-10-CM

## 2022-11-07 DIAGNOSIS — E78.2 MIXED HYPERLIPIDEMIA: ICD-10-CM

## 2022-11-07 DIAGNOSIS — Z78.0 POST-MENOPAUSAL: ICD-10-CM

## 2022-11-07 DIAGNOSIS — Z12.31 BREAST CANCER SCREENING BY MAMMOGRAM: ICD-10-CM

## 2022-11-07 DIAGNOSIS — F31.4 BIPOLAR DISORDER, CURRENT EPISODE DEPRESSED, SEVERE, WITHOUT PSYCHOTIC FEATURES: ICD-10-CM

## 2022-11-07 DIAGNOSIS — E03.9 ACQUIRED HYPOTHYROIDISM: ICD-10-CM

## 2022-11-07 PROCEDURE — 80061 LIPID PANEL: CPT | Performed by: NURSE PRACTITIONER

## 2022-11-07 PROCEDURE — 86431 RHEUMATOID FACTOR QUANT: CPT | Performed by: NURSE PRACTITIONER

## 2022-11-07 PROCEDURE — 90471 IMMUNIZATION ADMIN: CPT | Performed by: NURSE PRACTITIONER

## 2022-11-07 PROCEDURE — 99214 OFFICE O/P EST MOD 30 MIN: CPT | Performed by: NURSE PRACTITIONER

## 2022-11-07 PROCEDURE — 90686 IIV4 VACC NO PRSV 0.5 ML IM: CPT | Performed by: NURSE PRACTITIONER

## 2022-11-07 PROCEDURE — 80050 GENERAL HEALTH PANEL: CPT | Performed by: NURSE PRACTITIONER

## 2022-11-07 NOTE — PROGRESS NOTES
Chief Complaint  Chief Complaint   Patient presents with   • Arthritis     Pt states joint swelling/pain has been ongoing for a few months but has progressively gotten worse the last several weeks.  Today she rates it an 8/10 mostly the joints in her hands.            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  She has been using topical Rx alternative cream and an old diclofenac rx as needed that provides some relief.  For chronic back pain naproxen as effective, she alternates with diclofenac and naproxen    Bipolar d/o, stable on Pristiq daily and buspirone as needed.  She reports she is happy has a boyfriend now, treats her well, she denies excessive anxiety and is sleeping well.     Hyperlipidemia, The patient denies muscle aches, constipation, diarrhea, GI upset, fatigue, chest pain/pressure, exercise intolerance, dyspnea, palpitations, syncope and pedal edema.      Hypothyroidism, stable on medication, denies symptoms of constipation, weight gain/loss, hot or cold intolerance, hair loss, abnormal heart rate and fatigue.         The following portions of the patient's history were reviewed and updated as appropriate: allergies, current medications, past family history, past medical history, past social history, past surgical history and problem list.    Past Medical History:   Diagnosis Date   • Anemia    • Anxiety    • Arthritis    • Back  pain    • Chronic pain    • DDD (degenerative disc disease), cervical    • Depression    • Excessive daytime sleepiness    • Generalized headaches    • Head injury    • High cholesterol    • IBS (irritable bowel syndrome)    • Insomnia    • Neck pain    • Obstructive sleep apnea    • Osteoarthritis    • Osteoarthritis    • Osteoporosis    • Restless leg syndrome    • Rheumatoid arthritis (HCC)    • Snoring    • Thyroid disease      Past Surgical History:   Procedure Laterality Date   • BREAST LUMPECTOMY Left     x3   • KNEE SURGERY     • TONSILLECTOMY       Family History   Problem Relation Age of Onset   • Arthritis Mother    • Heart disease Mother    • Hypertension Mother    • Bipolar disorder Son    • OCD Son      Social History     Tobacco Use   • Smoking status: Every Day     Packs/day: 1.00     Years: 40.00     Pack years: 40.00     Types: Cigarettes   • Smokeless tobacco: Never   Substance Use Topics   • Alcohol use: Yes       Current Outpatient Medications:   •  acyclovir (ZOVIRAX) 400 MG tablet, TAKE 1 TABLET BY MOUTH 5 TIMES DAILY FOR 5 DAYS FOR BREAKOUT, OTHERWISE 1 TABLET DAILY., Disp: 50 tablet, Rfl: 2  •  atorvastatin (LIPITOR) 20 MG tablet, TAKE 1 TABLET BY MOUTH EVERYDAY AT BEDTIME, Disp: 90 tablet, Rfl: 3  •  baclofen (LIORESAL) 10 MG tablet, TAKE 1 TABLET BY MOUTH 3 TIMES A DAY AS NEEDED FOR MUSCLE SPASMS., Disp: 60 tablet, Rfl: 2  •  busPIRone (BUSPAR) 15 MG tablet, TAKE 1 TABLET BY MOUTH TWICE A DAY, Disp: 60 tablet, Rfl: 2  •  desvenlafaxine (Pristiq) 50 MG 24 hr tablet, Take 1 tablet by mouth Daily., Disp: 90 tablet, Rfl: 1  •  diazePAM (Valium) 5 MG tablet, Take 1/2 to 1 tablet by mouth every 12 hours as needed for breakthrough panic/anxiety (30 day supply), Disp: 40 tablet, Rfl: 2  •  Diclofenac Sodium (VOLTAREN) 1 % gel gel, APPLY 4 G TOPICALLY TO THE APPROPRIATE AREA AS DIRECTED 4 (FOUR) TIMES A DAY AS NEEDED (PAIN)., Disp: 200 g, Rfl: 1  •  diphenoxylate-atropine (LOMOTIL) 2.5-0.025 MG per  "tablet, TAKE 1 TABLET BY MOUTH 4 TIMES A DAY AS NEEDED FOR DIARRHEA (Patient taking differently: Pt states she will take 2x a day or up to 4x.), Disp: 30 tablet, Rfl: 2  •  levothyroxine (SYNTHROID, LEVOTHROID) 137 MCG tablet, TAKE 1 TABLET BY MOUTH EVERY DAY, Disp: 90 tablet, Rfl: 1  •  naproxen (NAPROSYN) 500 MG tablet, Take 1 tablet by mouth 2 (Two) Times a Day With Meals., Disp: 60 tablet, Rfl: 5  •  omeprazole (priLOSEC) 40 MG capsule, TAKE 1 CAPSULE BY MOUTH EVERY DAY, Disp: 90 capsule, Rfl: 1  •  traZODone (DESYREL) 300 MG tablet, TAKE 1 TABLET BY MOUTH AT NIGHT AS NEEDED FOR SLEEP., Disp: 90 tablet, Rfl: 1  •  azelastine (OPTIVAR) 0.05 % ophthalmic solution, INSTILL 1 DROP INTO BOTH EYES TWICE A DAY, Disp: , Rfl:     Objective   Vital Signs:   /83 (BP Location: Left arm, Patient Position: Sitting, Cuff Size: Adult)   Pulse 80   Temp 98.9 °F (37.2 °C) (Temporal)   Ht 162.6 cm (64\")   Wt 43.3 kg (95 lb 6.4 oz)   SpO2 97%   BMI 16.38 kg/m²           Physical Exam  Vitals and nursing note reviewed.   Constitutional:       General: She is not in acute distress.     Appearance: Normal appearance. She is well-developed and underweight. She is not diaphoretic.   Eyes:      Pupils: Pupils are equal, round, and reactive to light.   Neck:      Thyroid: No thyromegaly.      Vascular: No JVD.   Cardiovascular:      Rate and Rhythm: Normal rate and regular rhythm.      Heart sounds: Normal heart sounds. No murmur heard.  Pulmonary:      Effort: Pulmonary effort is normal. No respiratory distress.      Breath sounds: Normal breath sounds. No wheezing or rhonchi.   Abdominal:      General: Bowel sounds are normal. There is no distension.      Palpations: Abdomen is soft.      Tenderness: There is no abdominal tenderness.   Musculoskeletal:         General: Swelling, tenderness and deformity (B distal mcp joints and mid mcp joints, mod/severe melchor rom or distal joints) present. Normal range of motion.      " Cervical back: Normal range of motion and neck supple.   Skin:     General: Skin is warm and dry.   Neurological:      General: No focal deficit present.      Mental Status: She is alert and oriented to person, place, and time. Mental status is at baseline.      Sensory: No sensory deficit.   Psychiatric:         Mood and Affect: Mood normal.         Behavior: Behavior normal.         Thought Content: Thought content normal.         Judgment: Judgment normal.          Result Review :     Office Visit on 11/07/2022   Component Date Value Ref Range Status   • Total Cholesterol 11/07/2022 176  0 - 200 mg/dL Final   • Triglycerides 11/07/2022 105  0 - 150 mg/dL Final   • HDL Cholesterol 11/07/2022 124 (H)  40 - 60 mg/dL Final   • LDL Cholesterol  11/07/2022 34  0 - 100 mg/dL Final   • VLDL Cholesterol 11/07/2022 18  5 - 40 mg/dL Final   • LDL/HDL Ratio 11/07/2022 0.25   Final   • Glucose 11/07/2022 99  65 - 99 mg/dL Final   • BUN 11/07/2022 19  8 - 23 mg/dL Final   • Creatinine 11/07/2022 0.60  0.57 - 1.00 mg/dL Final   • Sodium 11/07/2022 138  136 - 145 mmol/L Final   • Potassium 11/07/2022 3.9  3.5 - 5.2 mmol/L Final   • Chloride 11/07/2022 103  98 - 107 mmol/L Final   • CO2 11/07/2022 23.6  22.0 - 29.0 mmol/L Final   • Calcium 11/07/2022 9.1  8.6 - 10.5 mg/dL Final   • Total Protein 11/07/2022 6.9  6.0 - 8.5 g/dL Final   • Albumin 11/07/2022 4.00  3.50 - 5.20 g/dL Final   • ALT (SGPT) 11/07/2022 29  1 - 33 U/L Final   • AST (SGOT) 11/07/2022 40 (H)  1 - 32 U/L Final   • Alkaline Phosphatase 11/07/2022 88  39 - 117 U/L Final   • Total Bilirubin 11/07/2022 0.5  0.0 - 1.2 mg/dL Final   • Globulin 11/07/2022 2.9  gm/dL Final   • A/G Ratio 11/07/2022 1.4  g/dL Final   • BUN/Creatinine Ratio 11/07/2022 31.7 (H)  7.0 - 25.0 Final   • Anion Gap 11/07/2022 11.4  5.0 - 15.0 mmol/L Final   • eGFR 11/07/2022 101.6  >60.0 mL/min/1.73 Final    National Kidney Foundation and American Society of Nephrology (ASN) Task Force  recommended calculation based on the Chronic Kidney Disease Epidemiology Collaboration (CKD-EPI) equation refit without adjustment for race.   • WBC 11/07/2022 6.06  3.40 - 10.80 10*3/mm3 Final   • RBC 11/07/2022 4.39  3.77 - 5.28 10*6/mm3 Final   • Hemoglobin 11/07/2022 13.7  12.0 - 15.9 g/dL Final   • Hematocrit 11/07/2022 40.0  34.0 - 46.6 % Final   • MCV 11/07/2022 91.1  79.0 - 97.0 fL Final   • MCH 11/07/2022 31.2  26.6 - 33.0 pg Final   • MCHC 11/07/2022 34.3  31.5 - 35.7 g/dL Final   • RDW 11/07/2022 11.5 (L)  12.3 - 15.4 % Final   • RDW-SD 11/07/2022 38.7  37.0 - 54.0 fl Final   • MPV 11/07/2022 12.4 (H)  6.0 - 12.0 fL Final   • Platelets 11/07/2022 124 (L)  140 - 450 10*3/mm3 Final   • TSH 11/07/2022 <0.005 (L)  0.270 - 4.200 uIU/mL Final   • Rheumatoid Factor Quantitative 11/07/2022 57.6 (H)  0.0 - 14.0 IU/mL Final                  BMI is below normal parameters (malnutrition). Recommendations: Increase protein in the diet           Assessment and Plan    Diagnoses and all orders for this visit:    1. Bilateral hand pain (Primary)  Comments:  Previous + rheumatoid factor, recheck today  Consider methotrexate d/t inability to travel to rheumatologist  Cont diclof and rx alt cream  Orders:  -     Rheumatoid Factor    2. Bipolar disorder, current episode depressed, severe, without psychotic features (HCC)    3. Mixed hyperlipidemia  -     Lipid Panel  -     Comprehensive Metabolic Panel  -     CBC (No Diff)    4. Other insomnia    5. Acquired hypothyroidism  -     TSH    6. Gastroesophageal reflux disease without esophagitis    7. Anxiety    8. Breast cancer screening by mammogram  -     Mammo Screening Digital Tomosynthesis Bilateral With CAD; Future    9. Post-menopausal  -     DEXA Bone Density Axial; Future    10. Degenerative disc disease, cervical  -     Rheumatoid Factor    Other orders  -     FluLaval/Fluarix/Fluzone >6 Months    All other conditions stable  Labs today as ordered, Will notify of lab  results, patient is not fasting  To new current medication regimen, no medication refills needed at this time  Mammogram and DEXA scan ordered      I spent 30 minutes caring for Michell Mckeon on this date of service. This time includes time spent by me in the following activities: preparing for the visit, reviewing tests, performing a medically appropriate examination and/or evaluation , counseling and educating the patient/family/caregiver, ordering medications, tests, or procedures and documenting information in the medical record        Follow Up     Return in about 6 months (around 5/7/2023) for Recheck.  Patient was given instructions and counseling regarding her condition or for health maintenance advice. Please see specific information pulled into the AVS if appropriate.        Part of this note may be an electronic transcription/translation of spoken language to printed text using the Dragon Dictation System

## 2022-11-07 NOTE — PROGRESS NOTES
Venipuncture Blood Specimen Collection  Venipuncture performed in the right arm by Shanika Torres MA with good hemostasis. Patient tolerated the procedure well without complications.   11/07/22   Shanika Torres MA

## 2022-11-08 DIAGNOSIS — M05.742 RHEUMATOID ARTHRITIS INVOLVING BOTH HANDS WITH POSITIVE RHEUMATOID FACTOR: Primary | ICD-10-CM

## 2022-11-08 DIAGNOSIS — M05.741 RHEUMATOID ARTHRITIS INVOLVING BOTH HANDS WITH POSITIVE RHEUMATOID FACTOR: Primary | ICD-10-CM

## 2022-11-08 LAB
ALBUMIN SERPL-MCNC: 4 G/DL (ref 3.5–5.2)
ALBUMIN/GLOB SERPL: 1.4 G/DL
ALP SERPL-CCNC: 88 U/L (ref 39–117)
ALT SERPL W P-5'-P-CCNC: 29 U/L (ref 1–33)
ANION GAP SERPL CALCULATED.3IONS-SCNC: 11.4 MMOL/L (ref 5–15)
AST SERPL-CCNC: 40 U/L (ref 1–32)
BILIRUB SERPL-MCNC: 0.5 MG/DL (ref 0–1.2)
BUN SERPL-MCNC: 19 MG/DL (ref 8–23)
BUN/CREAT SERPL: 31.7 (ref 7–25)
CALCIUM SPEC-SCNC: 9.1 MG/DL (ref 8.6–10.5)
CHLORIDE SERPL-SCNC: 103 MMOL/L (ref 98–107)
CHOLEST SERPL-MCNC: 176 MG/DL (ref 0–200)
CHROMATIN AB SERPL-ACNC: 57.6 IU/ML (ref 0–14)
CO2 SERPL-SCNC: 23.6 MMOL/L (ref 22–29)
CREAT SERPL-MCNC: 0.6 MG/DL (ref 0.57–1)
DEPRECATED RDW RBC AUTO: 38.7 FL (ref 37–54)
EGFRCR SERPLBLD CKD-EPI 2021: 101.6 ML/MIN/1.73
ERYTHROCYTE [DISTWIDTH] IN BLOOD BY AUTOMATED COUNT: 11.5 % (ref 12.3–15.4)
GLOBULIN UR ELPH-MCNC: 2.9 GM/DL
GLUCOSE SERPL-MCNC: 99 MG/DL (ref 65–99)
HCT VFR BLD AUTO: 40 % (ref 34–46.6)
HDLC SERPL-MCNC: 124 MG/DL (ref 40–60)
HGB BLD-MCNC: 13.7 G/DL (ref 12–15.9)
LDLC SERPL CALC-MCNC: 34 MG/DL (ref 0–100)
LDLC/HDLC SERPL: 0.25 {RATIO}
MCH RBC QN AUTO: 31.2 PG (ref 26.6–33)
MCHC RBC AUTO-ENTMCNC: 34.3 G/DL (ref 31.5–35.7)
MCV RBC AUTO: 91.1 FL (ref 79–97)
PLATELET # BLD AUTO: 124 10*3/MM3 (ref 140–450)
PMV BLD AUTO: 12.4 FL (ref 6–12)
POTASSIUM SERPL-SCNC: 3.9 MMOL/L (ref 3.5–5.2)
PROT SERPL-MCNC: 6.9 G/DL (ref 6–8.5)
RBC # BLD AUTO: 4.39 10*6/MM3 (ref 3.77–5.28)
SODIUM SERPL-SCNC: 138 MMOL/L (ref 136–145)
TRIGL SERPL-MCNC: 105 MG/DL (ref 0–150)
TSH SERPL DL<=0.05 MIU/L-ACNC: <0.005 UIU/ML (ref 0.27–4.2)
VLDLC SERPL-MCNC: 18 MG/DL (ref 5–40)
WBC NRBC COR # BLD: 6.06 10*3/MM3 (ref 3.4–10.8)

## 2022-11-08 RX ORDER — LEVOTHYROXINE SODIUM 0.12 MG/1
125 TABLET ORAL DAILY
Qty: 90 TABLET | Refills: 1 | Status: SHIPPED | OUTPATIENT
Start: 2022-11-08 | End: 2022-12-20 | Stop reason: SDUPTHER

## 2022-11-09 RX ORDER — BUSPIRONE HYDROCHLORIDE 15 MG/1
TABLET ORAL
Qty: 60 TABLET | Refills: 2 | Status: SHIPPED | OUTPATIENT
Start: 2022-11-09 | End: 2023-01-24 | Stop reason: SDUPTHER

## 2022-11-10 ENCOUNTER — TELEPHONE (OUTPATIENT)
Dept: FAMILY MEDICINE CLINIC | Facility: CLINIC | Age: 62
End: 2022-11-10

## 2022-11-10 NOTE — TELEPHONE ENCOUNTER
----- Message from LIBRA Rudd sent at 11/8/2022 12:43 PM EST -----  Please notify patient labs show:  1.  Thyroid level is hyperactive, need to cut back on her levothyroxine to 125 mcg daily, new prescription has been sent to pharmacy.  2.  Rheumatoid factor is positive still and more elevated than 2 years ago, I will start methotrexate 7.5 mg weekly, she will take three 2.5 mg tablets.   3.  Need to recheck TSH, CMP and CBC in 6 weeks  4.  Otherwise labs are stable, there is a slight decline in platelets and slight elevation of liver function which may also be related to uncontrolled rheumatoid arthritis.  We will recheck platelets and liver function as well in 6 weeks.

## 2022-11-12 DIAGNOSIS — R19.7 DIARRHEA, UNSPECIFIED TYPE: ICD-10-CM

## 2022-11-14 RX ORDER — DIPHENOXYLATE HYDROCHLORIDE AND ATROPINE SULFATE 2.5; .025 MG/1; MG/1
TABLET ORAL
Qty: 30 TABLET | Refills: 2 | Status: SHIPPED | OUTPATIENT
Start: 2022-11-14 | End: 2023-01-24

## 2022-12-03 DIAGNOSIS — F41.9 ANXIETY: ICD-10-CM

## 2022-12-05 ENCOUNTER — TELEPHONE (OUTPATIENT)
Dept: FAMILY MEDICINE CLINIC | Facility: CLINIC | Age: 62
End: 2022-12-05

## 2022-12-05 DIAGNOSIS — F41.9 ANXIETY: ICD-10-CM

## 2022-12-05 RX ORDER — DIAZEPAM 5 MG/1
TABLET ORAL
Qty: 40 TABLET | Refills: 2 | Status: SHIPPED | OUTPATIENT
Start: 2022-12-05 | End: 2023-03-24

## 2022-12-05 NOTE — TELEPHONE ENCOUNTER
PATIENT CALLED FOR MEDICATION REFILL OF  diazePAM (VALIUM) 5 MG tablet    SHE HAS ABOUT 5 TABLETS LEFT    [facility]/pharmacy #[id number] - [place], [place] - [address] - [phone]  - [phone]   [phone]    CALL BACK NUMBER [phone] CELL [phone]     SHE WOULD LIKE TO HAVE A CALL WHEN SENT TO PHARMACY

## 2022-12-15 RX ORDER — DESVENLAFAXINE SUCCINATE 50 MG/1
TABLET, EXTENDED RELEASE ORAL
Qty: 90 TABLET | Refills: 1 | Status: SHIPPED | OUTPATIENT
Start: 2022-12-15

## 2022-12-19 ENCOUNTER — CLINICAL SUPPORT (OUTPATIENT)
Dept: FAMILY MEDICINE CLINIC | Facility: CLINIC | Age: 62
End: 2022-12-19

## 2022-12-19 DIAGNOSIS — E61.2 MAGNESIUM DEFICIENCY: ICD-10-CM

## 2022-12-19 DIAGNOSIS — E03.9 ACQUIRED HYPOTHYROIDISM: Primary | ICD-10-CM

## 2022-12-19 PROCEDURE — 80050 GENERAL HEALTH PANEL: CPT | Performed by: NURSE PRACTITIONER

## 2022-12-19 PROCEDURE — 36415 COLL VENOUS BLD VENIPUNCTURE: CPT | Performed by: NURSE PRACTITIONER

## 2022-12-19 NOTE — PROGRESS NOTES
Venipuncture Blood Specimen Collection  Venipuncture performed in the right arm by Shanika Torres MA with good hemostasis. Patient tolerated the procedure well without complications.   12/19/22   Shanika Torres MA

## 2022-12-20 LAB
ALBUMIN SERPL-MCNC: 4.3 G/DL (ref 3.5–5.2)
ALBUMIN/GLOB SERPL: 1.4 G/DL
ALP SERPL-CCNC: 112 U/L (ref 39–117)
ALT SERPL W P-5'-P-CCNC: 33 U/L (ref 1–33)
ANION GAP SERPL CALCULATED.3IONS-SCNC: 9.3 MMOL/L (ref 5–15)
AST SERPL-CCNC: 44 U/L (ref 1–32)
BILIRUB SERPL-MCNC: 0.3 MG/DL (ref 0–1.2)
BUN SERPL-MCNC: 19 MG/DL (ref 8–23)
BUN/CREAT SERPL: 27.5 (ref 7–25)
CALCIUM SPEC-SCNC: 9.3 MG/DL (ref 8.6–10.5)
CHLORIDE SERPL-SCNC: 102 MMOL/L (ref 98–107)
CO2 SERPL-SCNC: 26.7 MMOL/L (ref 22–29)
CREAT SERPL-MCNC: 0.69 MG/DL (ref 0.57–1)
DEPRECATED RDW RBC AUTO: 41.9 FL (ref 37–54)
EGFRCR SERPLBLD CKD-EPI 2021: 98.3 ML/MIN/1.73
ERYTHROCYTE [DISTWIDTH] IN BLOOD BY AUTOMATED COUNT: 12.6 % (ref 12.3–15.4)
GLOBULIN UR ELPH-MCNC: 3.1 GM/DL
GLUCOSE SERPL-MCNC: 120 MG/DL (ref 65–99)
HCT VFR BLD AUTO: 42.7 % (ref 34–46.6)
HGB BLD-MCNC: 14.5 G/DL (ref 12–15.9)
MCH RBC QN AUTO: 31.1 PG (ref 26.6–33)
MCHC RBC AUTO-ENTMCNC: 34 G/DL (ref 31.5–35.7)
MCV RBC AUTO: 91.6 FL (ref 79–97)
PLATELET # BLD AUTO: 142 10*3/MM3 (ref 140–450)
PMV BLD AUTO: 12.4 FL (ref 6–12)
POTASSIUM SERPL-SCNC: 3.7 MMOL/L (ref 3.5–5.2)
PROT SERPL-MCNC: 7.4 G/DL (ref 6–8.5)
RBC # BLD AUTO: 4.66 10*6/MM3 (ref 3.77–5.28)
SODIUM SERPL-SCNC: 138 MMOL/L (ref 136–145)
TSH SERPL DL<=0.05 MIU/L-ACNC: 0.01 UIU/ML (ref 0.27–4.2)
WBC NRBC COR # BLD: 7.39 10*3/MM3 (ref 3.4–10.8)

## 2022-12-20 RX ORDER — LEVOTHYROXINE SODIUM 112 UG/1
112 TABLET ORAL DAILY
Qty: 30 TABLET | Refills: 1 | Status: SHIPPED | OUTPATIENT
Start: 2022-12-20 | End: 2023-02-13 | Stop reason: SDUPTHER

## 2023-01-05 ENCOUNTER — TELEPHONE (OUTPATIENT)
Dept: FAMILY MEDICINE CLINIC | Facility: CLINIC | Age: 63
End: 2023-01-05

## 2023-01-05 DIAGNOSIS — M05.742 RHEUMATOID ARTHRITIS INVOLVING BOTH HANDS WITH POSITIVE RHEUMATOID FACTOR: Primary | ICD-10-CM

## 2023-01-05 DIAGNOSIS — M05.741 RHEUMATOID ARTHRITIS INVOLVING BOTH HANDS WITH POSITIVE RHEUMATOID FACTOR: Primary | ICD-10-CM

## 2023-01-05 NOTE — TELEPHONE ENCOUNTER
Caller: Michell Mckeon    Relationship: Self    Best call back number: [phone]    What orders are you requesting (i.e. lab or imaging): XRAY OF HANDS TO SEE HOW BAD ARTHRITIS HAS GOTTEN    Additional notes:PATIENT IS ALREADY SCHEDULED FOR MAMMOGRAM AND DEXA ON 1/17/2023. WOULD LIKE ORDER TO BE ADDED

## 2023-01-06 NOTE — TELEPHONE ENCOUNTER
Attempted to notify pt but she did not answer. I left a detailed message letting her know the x-ray has been ordered. I encouraged the pt to call if she has any questions. Verbal consent in chart

## 2023-01-16 RX ORDER — ACYCLOVIR 400 MG/1
TABLET ORAL
Qty: 50 TABLET | Refills: 2 | Status: SHIPPED | OUTPATIENT
Start: 2023-01-16

## 2023-01-20 RX ORDER — ALENDRONATE SODIUM 70 MG/1
TABLET ORAL
Qty: 12 TABLET | Refills: 3 | Status: SHIPPED | OUTPATIENT
Start: 2023-01-20

## 2023-01-21 DIAGNOSIS — R19.7 DIARRHEA, UNSPECIFIED TYPE: ICD-10-CM

## 2023-01-23 RX ORDER — DICLOFENAC SODIUM 75 MG/1
TABLET, DELAYED RELEASE ORAL
Qty: 60 TABLET | Refills: 5 | OUTPATIENT
Start: 2023-01-23

## 2023-01-24 ENCOUNTER — TELEPHONE (OUTPATIENT)
Dept: FAMILY MEDICINE CLINIC | Facility: CLINIC | Age: 63
End: 2023-01-24

## 2023-01-24 ENCOUNTER — OFFICE VISIT (OUTPATIENT)
Dept: FAMILY MEDICINE CLINIC | Facility: CLINIC | Age: 63
End: 2023-01-24
Payer: MEDICAID

## 2023-01-24 VITALS
DIASTOLIC BLOOD PRESSURE: 78 MMHG | HEIGHT: 65 IN | BODY MASS INDEX: 16.1 KG/M2 | OXYGEN SATURATION: 97 % | HEART RATE: 79 BPM | WEIGHT: 96.6 LBS | SYSTOLIC BLOOD PRESSURE: 116 MMHG

## 2023-01-24 DIAGNOSIS — M05.742 RHEUMATOID ARTHRITIS INVOLVING BOTH HANDS WITH POSITIVE RHEUMATOID FACTOR: Primary | ICD-10-CM

## 2023-01-24 DIAGNOSIS — F31.4 BIPOLAR DISORDER, CURRENT EPISODE DEPRESSED, SEVERE, WITHOUT PSYCHOTIC FEATURES: ICD-10-CM

## 2023-01-24 DIAGNOSIS — K21.9 GASTROESOPHAGEAL REFLUX DISEASE WITHOUT ESOPHAGITIS: ICD-10-CM

## 2023-01-24 DIAGNOSIS — E03.9 ACQUIRED HYPOTHYROIDISM: ICD-10-CM

## 2023-01-24 DIAGNOSIS — E78.2 MIXED HYPERLIPIDEMIA: ICD-10-CM

## 2023-01-24 DIAGNOSIS — G47.09 OTHER INSOMNIA: ICD-10-CM

## 2023-01-24 DIAGNOSIS — M81.0 AGE-RELATED OSTEOPOROSIS WITHOUT CURRENT PATHOLOGICAL FRACTURE: ICD-10-CM

## 2023-01-24 DIAGNOSIS — M05.741 RHEUMATOID ARTHRITIS INVOLVING BOTH HANDS WITH POSITIVE RHEUMATOID FACTOR: Primary | ICD-10-CM

## 2023-01-24 DIAGNOSIS — R19.7 DIARRHEA, UNSPECIFIED TYPE: ICD-10-CM

## 2023-01-24 PROCEDURE — 99214 OFFICE O/P EST MOD 30 MIN: CPT | Performed by: NURSE PRACTITIONER

## 2023-01-24 RX ORDER — OMEPRAZOLE 40 MG/1
40 CAPSULE, DELAYED RELEASE ORAL DAILY
Qty: 90 CAPSULE | Refills: 1 | Status: SHIPPED | OUTPATIENT
Start: 2023-01-24

## 2023-01-24 RX ORDER — BUSPIRONE HYDROCHLORIDE 15 MG/1
15 TABLET ORAL 2 TIMES DAILY
Qty: 180 TABLET | Refills: 3 | Status: SHIPPED | OUTPATIENT
Start: 2023-01-24

## 2023-01-24 RX ORDER — ATORVASTATIN CALCIUM 20 MG/1
20 TABLET, FILM COATED ORAL
Qty: 90 TABLET | Refills: 3 | Status: SHIPPED | OUTPATIENT
Start: 2023-01-24

## 2023-01-24 RX ORDER — PREDNISONE 10 MG/1
10 TABLET ORAL DAILY PRN
Qty: 30 TABLET | Refills: 2 | Status: SHIPPED | OUTPATIENT
Start: 2023-01-24

## 2023-01-24 RX ORDER — DIPHENOXYLATE HYDROCHLORIDE AND ATROPINE SULFATE 2.5; .025 MG/1; MG/1
TABLET ORAL
Qty: 30 TABLET | Refills: 2 | Status: SHIPPED | OUTPATIENT
Start: 2023-01-24 | End: 2023-01-24 | Stop reason: SDUPTHER

## 2023-01-24 RX ORDER — DIPHENOXYLATE HYDROCHLORIDE AND ATROPINE SULFATE 2.5; .025 MG/1; MG/1
2 TABLET ORAL DAILY
Qty: 60 TABLET | Refills: 2 | Status: SHIPPED | OUTPATIENT
Start: 2023-01-24

## 2023-01-24 NOTE — TELEPHONE ENCOUNTER
Spoke with pt while checking out from appt, pt indicated will complete xray informed they are walk in.  Faxed order to [facility]

## 2023-01-24 NOTE — TELEPHONE ENCOUNTER
Called pt regarding hand xray, person who answered phone stated pt was at work.  Asked person who answered if msg to call office could be given to pt.

## 2023-01-24 NOTE — PROGRESS NOTES
Chief Complaint  Chief Complaint   Patient presents with   • Arthritis     Pt says her RA is spreading throughout her body and she is in constant severe pain in her hands, neck, and shoulders           Subjective          Michell Mckeon presents to [facility] for   History of Present Illness       Rheumatid arthritis with +factor, she has been unable to get into rheumatology due to her insurance, therefore she was started on methotrexate but she reports no improvement yet, her hands are still swelling and painful daily, now with pain in hips, knees, shoulders, elbows. She is never without pain. Diclofenac gel helps some but minimal.     Hyperlipidemia, The patient denies muscle aches, constipation, diarrhea, GI upset, fatigue, chest pain/pressure, exercise intolerance, dyspnea, palpitations, syncope and pedal edema.      Diarrhea, daily, stable and has normal BM's if takes 2 lomotil daily    Hypothyroidism, stable on medication, denies symptoms of constipation, weight gain/loss, hot or cold intolerance, hair loss, abnormal heart rate and fatigue.     Insomnia, stable and taking trazodone prn        The following portions of the patient's history were reviewed and updated as appropriate: allergies, current medications, past family history, past medical history, past social history, past surgical history and problem list.    Past Medical History:   Diagnosis Date   • Anemia    • Anxiety    • Arthritis    • Back pain    • Chronic pain    • DDD (degenerative disc disease), cervical    • Depression    • Excessive daytime sleepiness    • Generalized headaches    • Head injury    • High cholesterol    • IBS (irritable bowel syndrome)    • Insomnia    • Neck pain    • Obstructive sleep apnea    • Osteoarthritis    • Osteoarthritis    • Osteoporosis    • Restless leg syndrome    • Rheumatoid arthritis (HCC)    • Snoring    • Thyroid disease      Past Surgical History:   Procedure Laterality  Date   • BREAST LUMPECTOMY Left     x3   • KNEE SURGERY     • TONSILLECTOMY       Family History   Problem Relation Age of Onset   • Arthritis Mother    • Heart disease Mother    • Hypertension Mother    • Bipolar disorder Son    • OCD Son      Social History     Tobacco Use   • Smoking status: Every Day     Packs/day: 1.00     Years: 40.00     Pack years: 40.00     Types: Cigarettes   • Smokeless tobacco: Never   Substance Use Topics   • Alcohol use: Yes       Current Outpatient Medications:   •  acyclovir (ZOVIRAX) 400 MG tablet, TAKE 1 TABLET BY MOUTH 5 TIMES DAILY FOR 5 DAYS FOR BREAKOUT, OTHERWISE 1 TABLET DAILY., Disp: 50 tablet, Rfl: 2  •  alendronate (Fosamax) 70 MG tablet, 1 tablet by mouth every 7 days on empty stomach, with water only, stay upright for 30 minutes, Disp: 12 tablet, Rfl: 3  •  atorvastatin (LIPITOR) 20 MG tablet, Take 1 tablet by mouth every night at bedtime., Disp: 90 tablet, Rfl: 3  •  azelastine (OPTIVAR) 0.05 % ophthalmic solution, INSTILL 1 DROP INTO BOTH EYES TWICE A DAY, Disp: , Rfl:   •  baclofen (LIORESAL) 10 MG tablet, TAKE 1 TABLET BY MOUTH 3 TIMES A DAY AS NEEDED FOR MUSCLE SPASMS., Disp: 60 tablet, Rfl: 2  •  busPIRone (BUSPAR) 15 MG tablet, Take 1 tablet by mouth 2 (Two) Times a Day., Disp: 180 tablet, Rfl: 3  •  desvenlafaxine (PRISTIQ) 50 MG 24 hr tablet, TAKE 1 TABLET BY MOUTH EVERY DAY, Disp: 90 tablet, Rfl: 1  •  diazePAM (VALIUM) 5 MG tablet, TAKE 1/2 TO 1 TABLET BY MOUTH EVERY 12 HOURS AS NEEDED FOR BREAKTHROUGH PANIC/ANXIETY *PA DENIED*, Disp: 40 tablet, Rfl: 2  •  Diclofenac Sodium (VOLTAREN) 1 % gel gel, APPLY 4 G TOPICALLY TO THE APPROPRIATE AREA AS DIRECTED 4 (FOUR) TIMES A DAY AS NEEDED (PAIN)., Disp: 200 g, Rfl: 1  •  diphenoxylate-atropine (LOMOTIL) 2.5-0.025 MG per tablet, Take 2 tablets by mouth Daily., Disp: 60 tablet, Rfl: 2  •  levothyroxine (SYNTHROID, LEVOTHROID) 112 MCG tablet, Take 1 tablet by mouth Daily., Disp: 30 tablet, Rfl: 1  •  methotrexate 2.5  "MG tablet, Take 4 tablets by mouth 1 (One) Time Per Week., Disp: 90 tablet, Rfl: 2  •  naproxen (NAPROSYN) 500 MG tablet, Take 1 tablet by mouth 2 (Two) Times a Day With Meals., Disp: 60 tablet, Rfl: 5  •  omeprazole (priLOSEC) 40 MG capsule, Take 1 capsule by mouth Daily., Disp: 90 capsule, Rfl: 1  •  traZODone (DESYREL) 300 MG tablet, TAKE 1 TABLET BY MOUTH AT NIGHT AS NEEDED FOR SLEEP., Disp: 90 tablet, Rfl: 1  •  predniSONE (DELTASONE) 10 MG tablet, Take 1 tablet by mouth Daily As Needed (rheumatoid arthritis flare up)., Disp: 30 tablet, Rfl: 2    Objective   Vital Signs:   /78 (BP Location: Left arm, Patient Position: Sitting, Cuff Size: Small Adult)   Pulse 79   Ht 165.1 cm (65\")   Wt 43.8 kg (96 lb 9.6 oz)   SpO2 97%   BMI 16.08 kg/m²       Pain level 6:10 multijoints    Physical Exam  Vitals and nursing note reviewed.   Constitutional:       General: She is not in acute distress.     Appearance: Normal appearance. She is well-developed and underweight. She is not diaphoretic.      Comments: in pain   Eyes:      Pupils: Pupils are equal, round, and reactive to light.   Neck:      Thyroid: No thyromegaly.      Vascular: No JVD.   Cardiovascular:      Rate and Rhythm: Normal rate and regular rhythm.      Heart sounds: Normal heart sounds. No murmur heard.  Pulmonary:      Effort: Pulmonary effort is normal. No respiratory distress.      Breath sounds: Normal breath sounds. No wheezing or rhonchi.   Abdominal:      General: Bowel sounds are normal. There is no distension.      Palpations: Abdomen is soft.      Tenderness: There is no abdominal tenderness.   Musculoskeletal:         General: Swelling, tenderness (multi joints, hands, knees, hips, elbows, shoulders, good rom) and deformity (B distal mcp joints and mid mcp joints, mod/severe melchor rom or distal joints) present. Normal range of motion.      Cervical back: Normal range of motion and neck supple.   Skin:     General: Skin is warm and dry. "   Neurological:      General: No focal deficit present.      Mental Status: She is alert and oriented to person, place, and time. Mental status is at baseline.      Sensory: No sensory deficit.   Psychiatric:         Mood and Affect: Mood normal.         Behavior: Behavior normal.         Thought Content: Thought content normal.         Judgment: Judgment normal.          Result Review :     No visits with results within 7 Day(s) from this visit.   Latest known visit with results is:   Clinical Support on 12/19/2022   Component Date Value Ref Range Status   • TSH 12/19/2022 0.014 (L)  0.270 - 4.200 uIU/mL Final   • Glucose 12/19/2022 120 (H)  65 - 99 mg/dL Final   • BUN 12/19/2022 19  8 - 23 mg/dL Final   • Creatinine 12/19/2022 0.69  0.57 - 1.00 mg/dL Final   • Sodium 12/19/2022 138  136 - 145 mmol/L Final   • Potassium 12/19/2022 3.7  3.5 - 5.2 mmol/L Final   • Chloride 12/19/2022 102  98 - 107 mmol/L Final   • CO2 12/19/2022 26.7  22.0 - 29.0 mmol/L Final   • Calcium 12/19/2022 9.3  8.6 - 10.5 mg/dL Final   • Total Protein 12/19/2022 7.4  6.0 - 8.5 g/dL Final   • Albumin 12/19/2022 4.30  3.50 - 5.20 g/dL Final   • ALT (SGPT) 12/19/2022 33  1 - 33 U/L Final   • AST (SGOT) 12/19/2022 44 (H)  1 - 32 U/L Final   • Alkaline Phosphatase 12/19/2022 112  39 - 117 U/L Final   • Total Bilirubin 12/19/2022 0.3  0.0 - 1.2 mg/dL Final   • Globulin 12/19/2022 3.1  gm/dL Final   • A/G Ratio 12/19/2022 1.4  g/dL Final   • BUN/Creatinine Ratio 12/19/2022 27.5 (H)  7.0 - 25.0 Final   • Anion Gap 12/19/2022 9.3  5.0 - 15.0 mmol/L Final   • eGFR 12/19/2022 98.3  >60.0 mL/min/1.73 Final    National Kidney Foundation and American Society of Nephrology (ASN) Task Force recommended calculation based on the Chronic Kidney Disease Epidemiology Collaboration (CKD-EPI) equation refit without adjustment for race.   • WBC 12/19/2022 7.39  3.40 - 10.80 10*3/mm3 Final   • RBC 12/19/2022 4.66  3.77 - 5.28 10*6/mm3 Final   • Hemoglobin 12/19/2022  14.5  12.0 - 15.9 g/dL Final   • Hematocrit 12/19/2022 42.7  34.0 - 46.6 % Final   • MCV 12/19/2022 91.6  79.0 - 97.0 fL Final   • MCH 12/19/2022 31.1  26.6 - 33.0 pg Final   • MCHC 12/19/2022 34.0  31.5 - 35.7 g/dL Final   • RDW 12/19/2022 12.6  12.3 - 15.4 % Final   • RDW-SD 12/19/2022 41.9  37.0 - 54.0 fl Final   • MPV 12/19/2022 12.4 (H)  6.0 - 12.0 fL Final   • Platelets 12/19/2022 142  140 - 450 10*3/mm3 Final                  BMI is below normal parameters (malnutrition). Recommendations: increase protein and calories in diet           Assessment and Plan    Diagnoses and all orders for this visit:    1. Rheumatoid arthritis involving both hands with positive rheumatoid factor (HCC) (Primary)  Comments:  check AMBER in 2 weeks  increase methotrexate to 10mg  add prednisone for flares to use prn  start naproxen daily  cont to monitor LFT's    2. Acquired hypothyroidism  Comments:  reviewed labs in december when resulted, TSH hyperactive and levothyroxine decreased to 112.   recheck TSH in 2 wks    3. Mixed hyperlipidemia  Comments:  lipids stable, cont statin  Orders:  -     atorvastatin (LIPITOR) 20 MG tablet; Take 1 tablet by mouth every night at bedtime.  Dispense: 90 tablet; Refill: 3    4. Diarrhea, unspecified type  Comments:  stable, cont/rf lomotil 2/day  Orders:  -     diphenoxylate-atropine (LOMOTIL) 2.5-0.025 MG per tablet; Take 2 tablets by mouth Daily.  Dispense: 60 tablet; Refill: 2    5. Other insomnia  Comments:  Improved, continue trazodone 300 mg.    6. Gastroesophageal reflux disease without esophagitis  Comments:  stable, cont/rf omeprazole  Orders:  -     omeprazole (priLOSEC) 40 MG capsule; Take 1 capsule by mouth Daily.  Dispense: 90 capsule; Refill: 1    7. Age-related osteoporosis without current pathological fracture  Comments:  reviewed DEXA, start alendronate, discussed instructions take separate from other meds, with water, stay upright 30min weekly    8. Bipolar disorder, current  episode depressed, severe, without psychotic features (HCC)  Comments:  stable on current med regimen, cont pristiq and buspirone, refill meds when needed.     Other orders  -     predniSONE (DELTASONE) 10 MG tablet; Take 1 tablet by mouth Daily As Needed (rheumatoid arthritis flare up).  Dispense: 30 tablet; Refill: 2  -     methotrexate 2.5 MG tablet; Take 4 tablets by mouth 1 (One) Time Per Week.  Dispense: 90 tablet; Refill: 2  -     busPIRone (BUSPAR) 15 MG tablet; Take 1 tablet by mouth 2 (Two) Times a Day.  Dispense: 180 tablet; Refill: 3        I spent 35 minutes caring for Michell Mckeon on this date of service. This time includes time spent by me in the following activities: preparing for the visit, reviewing tests, performing a medically appropriate examination and/or evaluation , counseling and educating the patient/family/caregiver, ordering medications, tests, or procedures and documenting information in the medical record        Follow Up     Return in about 3 months (around 4/24/2023) for Recheck. CMP, TSH and rheum factor in 2 weeks. CMP/CBC/AMBER prior to 3mo apt.  Patient was given instructions and counseling regarding her condition or for health maintenance advice. Please see specific information pulled into the AVS if appropriate.        Part of this note may be an electronic transcription/translation of spoken language to printed text using the Dragon Dictation System

## 2023-01-31 ENCOUNTER — TELEPHONE (OUTPATIENT)
Dept: FAMILY MEDICINE CLINIC | Facility: CLINIC | Age: 63
End: 2023-01-31
Payer: MEDICAID

## 2023-02-01 RX ORDER — MECLIZINE HYDROCHLORIDE 25 MG/1
25 TABLET ORAL 3 TIMES DAILY PRN
Qty: 30 TABLET | Refills: 0 | Status: SHIPPED | OUTPATIENT
Start: 2023-02-01

## 2023-02-03 NOTE — TELEPHONE ENCOUNTER
Attempted to call pt regarding xray hand, no answer: per verbal left msg asking if pt has completed xray and to call office

## 2023-02-06 ENCOUNTER — CLINICAL SUPPORT (OUTPATIENT)
Dept: FAMILY MEDICINE CLINIC | Facility: CLINIC | Age: 63
End: 2023-02-06
Payer: MEDICAID

## 2023-02-06 DIAGNOSIS — E03.9 ACQUIRED HYPOTHYROIDISM: Primary | ICD-10-CM

## 2023-02-06 PROCEDURE — 36415 COLL VENOUS BLD VENIPUNCTURE: CPT | Performed by: NURSE PRACTITIONER

## 2023-02-06 PROCEDURE — 80050 GENERAL HEALTH PANEL: CPT | Performed by: NURSE PRACTITIONER

## 2023-02-06 NOTE — PROGRESS NOTES
Venipuncture Blood Specimen Collection  Venipuncture performed in the right arm by Shanika Torres MA with good hemostasis. Patient tolerated the procedure well without complications.   02/06/23   Shanika Torres MA

## 2023-02-07 LAB
ALBUMIN SERPL-MCNC: 4.1 G/DL (ref 3.5–5.2)
ALBUMIN/GLOB SERPL: 1.5 G/DL
ALP SERPL-CCNC: 95 U/L (ref 39–117)
ALT SERPL W P-5'-P-CCNC: 41 U/L (ref 1–33)
ANION GAP SERPL CALCULATED.3IONS-SCNC: 11.7 MMOL/L (ref 5–15)
AST SERPL-CCNC: 37 U/L (ref 1–32)
BILIRUB SERPL-MCNC: 0.5 MG/DL (ref 0–1.2)
BUN SERPL-MCNC: 16 MG/DL (ref 8–23)
BUN/CREAT SERPL: 17.8 (ref 7–25)
CALCIUM SPEC-SCNC: 8.6 MG/DL (ref 8.6–10.5)
CHLORIDE SERPL-SCNC: 100 MMOL/L (ref 98–107)
CO2 SERPL-SCNC: 22.3 MMOL/L (ref 22–29)
CREAT SERPL-MCNC: 0.9 MG/DL (ref 0.57–1)
DEPRECATED RDW RBC AUTO: 46.2 FL (ref 37–54)
EGFRCR SERPLBLD CKD-EPI 2021: 72.4 ML/MIN/1.73
ERYTHROCYTE [DISTWIDTH] IN BLOOD BY AUTOMATED COUNT: 13.1 % (ref 12.3–15.4)
GLOBULIN UR ELPH-MCNC: 2.8 GM/DL
GLUCOSE SERPL-MCNC: 240 MG/DL (ref 65–99)
HCT VFR BLD AUTO: 41.9 % (ref 34–46.6)
HGB BLD-MCNC: 14.4 G/DL (ref 12–15.9)
MCH RBC QN AUTO: 32.9 PG (ref 26.6–33)
MCHC RBC AUTO-ENTMCNC: 34.4 G/DL (ref 31.5–35.7)
MCV RBC AUTO: 95.7 FL (ref 79–97)
PLATELET # BLD AUTO: 149 10*3/MM3 (ref 140–450)
PMV BLD AUTO: 12.2 FL (ref 6–12)
POTASSIUM SERPL-SCNC: 3.3 MMOL/L (ref 3.5–5.2)
PROT SERPL-MCNC: 6.9 G/DL (ref 6–8.5)
RBC # BLD AUTO: 4.38 10*6/MM3 (ref 3.77–5.28)
SODIUM SERPL-SCNC: 134 MMOL/L (ref 136–145)
TSH SERPL DL<=0.05 MIU/L-ACNC: 0.03 UIU/ML (ref 0.27–4.2)
WBC NRBC COR # BLD: 10.44 10*3/MM3 (ref 3.4–10.8)

## 2023-02-08 ENCOUNTER — TELEPHONE (OUTPATIENT)
Dept: FAMILY MEDICINE CLINIC | Facility: CLINIC | Age: 63
End: 2023-02-08

## 2023-02-08 NOTE — TELEPHONE ENCOUNTER
Caller: Kaila Mathis [place], [place] [address] - [phone]  - [phone] FX    Relationship: Pharmacy      What medication are you requesting:  COMPOUNDED PAIN CREAM:   GABAPENTIN  IBUPROFEN  LIDOCAINE   AND BACLOFEN      Have you had these symptoms before:    [x] Yes  [] No    Have you been treated for these symptoms before:   [x] Yes  [] No    If a prescription is needed, what is your preferred pharmacy and phone number: [facility] [place], [place] [address] - [phone]  - [phone] FX     Additional notes:

## 2023-02-10 NOTE — TELEPHONE ENCOUNTER
Attempted to call pt regarding xray hand, no answer: per verbal left msg to call office. Sending letter

## 2023-02-13 RX ORDER — LEVOTHYROXINE SODIUM 0.1 MG/1
100 TABLET ORAL DAILY
Qty: 30 TABLET | Refills: 1 | Status: SHIPPED | OUTPATIENT
Start: 2023-02-13

## 2023-02-20 RX ORDER — LEVOTHYROXINE SODIUM 112 UG/1
TABLET ORAL
Qty: 30 TABLET | Refills: 1 | OUTPATIENT
Start: 2023-02-20

## 2023-03-23 DIAGNOSIS — F41.9 ANXIETY: ICD-10-CM

## 2023-03-23 NOTE — TELEPHONE ENCOUNTER
anmol spoke to pt and she says she is wanting to know if you can increase her diazepam. Pt states she is getting evicted/moving out and she is feeling very anxious. She would like to increase the dose to 10mg.

## 2023-03-24 RX ORDER — DIAZEPAM 5 MG/1
TABLET ORAL
Qty: 40 TABLET | Refills: 2 | Status: SHIPPED | OUTPATIENT
Start: 2023-03-24

## 2023-03-24 NOTE — TELEPHONE ENCOUNTER
"Pt returned call and was informed we can not increase dose of valium. Pt states she will \"just take 2\" if she needs it. I advised pt not to do this as it can become dangerous if she takes 2 tablets. I advised pt to take buspar as directed (one tablet, twice daily) and to take up to one tablet of valium daily if needed. Pt states sometimes she gets very stressed due to being evicted for the 6th time and sometimes she feels like she needs more meds but pt has not been taking buspar regularly. Advised pt to take buspar as directed and to take one tablet of valium daily if needed. Pt indicated understanding. Pt was reminded of upcoming appts   "

## 2023-04-17 ENCOUNTER — CLINICAL SUPPORT (OUTPATIENT)
Dept: FAMILY MEDICINE CLINIC | Facility: CLINIC | Age: 63
End: 2023-04-17
Payer: MEDICAID

## 2023-04-17 DIAGNOSIS — M05.742 RHEUMATOID ARTHRITIS INVOLVING BOTH HANDS WITH POSITIVE RHEUMATOID FACTOR: Primary | ICD-10-CM

## 2023-04-17 DIAGNOSIS — M05.741 RHEUMATOID ARTHRITIS INVOLVING BOTH HANDS WITH POSITIVE RHEUMATOID FACTOR: Primary | ICD-10-CM

## 2023-04-17 DIAGNOSIS — R79.89 ELEVATED LIVER FUNCTION TESTS: ICD-10-CM

## 2023-04-17 PROCEDURE — 80053 COMPREHEN METABOLIC PANEL: CPT | Performed by: NURSE PRACTITIONER

## 2023-04-17 PROCEDURE — 36415 COLL VENOUS BLD VENIPUNCTURE: CPT | Performed by: NURSE PRACTITIONER

## 2023-04-17 PROCEDURE — 86431 RHEUMATOID FACTOR QUANT: CPT | Performed by: NURSE PRACTITIONER

## 2023-04-17 PROCEDURE — 85025 COMPLETE CBC W/AUTO DIFF WBC: CPT | Performed by: NURSE PRACTITIONER

## 2023-04-17 NOTE — PROGRESS NOTES
Venipuncture Blood Specimen Collection  Venipuncture performed in the right arm by Shanika Torres MA with good hemostasis. Patient tolerated the procedure well without complications.   04/17/23   Shanika Torres MA

## 2023-04-18 LAB
ALBUMIN SERPL-MCNC: 4.4 G/DL (ref 3.5–5.2)
ALBUMIN/GLOB SERPL: 1.5 G/DL
ALP SERPL-CCNC: 79 U/L (ref 39–117)
ALT SERPL W P-5'-P-CCNC: 43 U/L (ref 1–33)
ANION GAP SERPL CALCULATED.3IONS-SCNC: 12.7 MMOL/L (ref 5–15)
AST SERPL-CCNC: 63 U/L (ref 1–32)
BASOPHILS # BLD AUTO: 0.03 10*3/MM3 (ref 0–0.2)
BASOPHILS NFR BLD AUTO: 0.4 % (ref 0–1.5)
BILIRUB SERPL-MCNC: 0.3 MG/DL (ref 0–1.2)
BUN SERPL-MCNC: 15 MG/DL (ref 8–23)
BUN/CREAT SERPL: 20.5 (ref 7–25)
CALCIUM SPEC-SCNC: 9.5 MG/DL (ref 8.6–10.5)
CHLORIDE SERPL-SCNC: 99 MMOL/L (ref 98–107)
CHROMATIN AB SERPL-ACNC: 42 IU/ML (ref 0–14)
CO2 SERPL-SCNC: 26.3 MMOL/L (ref 22–29)
CREAT SERPL-MCNC: 0.73 MG/DL (ref 0.57–1)
DEPRECATED RDW RBC AUTO: 44.9 FL (ref 37–54)
EGFRCR SERPLBLD CKD-EPI 2021: 92.5 ML/MIN/1.73
EOSINOPHIL # BLD AUTO: 0.04 10*3/MM3 (ref 0–0.4)
EOSINOPHIL NFR BLD AUTO: 0.5 % (ref 0.3–6.2)
ERYTHROCYTE [DISTWIDTH] IN BLOOD BY AUTOMATED COUNT: 13 % (ref 12.3–15.4)
GLOBULIN UR ELPH-MCNC: 2.9 GM/DL
GLUCOSE SERPL-MCNC: 137 MG/DL (ref 65–99)
HCT VFR BLD AUTO: 42 % (ref 34–46.6)
HGB BLD-MCNC: 14.2 G/DL (ref 12–15.9)
IMM GRANULOCYTES # BLD AUTO: 0.04 10*3/MM3 (ref 0–0.05)
IMM GRANULOCYTES NFR BLD AUTO: 0.5 % (ref 0–0.5)
LYMPHOCYTES # BLD AUTO: 1.13 10*3/MM3 (ref 0.7–3.1)
LYMPHOCYTES NFR BLD AUTO: 14.8 % (ref 19.6–45.3)
MCH RBC QN AUTO: 32.3 PG (ref 26.6–33)
MCHC RBC AUTO-ENTMCNC: 33.8 G/DL (ref 31.5–35.7)
MCV RBC AUTO: 95.5 FL (ref 79–97)
MONOCYTES # BLD AUTO: 0.28 10*3/MM3 (ref 0.1–0.9)
MONOCYTES NFR BLD AUTO: 3.7 % (ref 5–12)
NEUTROPHILS NFR BLD AUTO: 6.14 10*3/MM3 (ref 1.7–7)
NEUTROPHILS NFR BLD AUTO: 80.1 % (ref 42.7–76)
NRBC BLD AUTO-RTO: 0 /100 WBC (ref 0–0.2)
PLATELET # BLD AUTO: 142 10*3/MM3 (ref 140–450)
PMV BLD AUTO: 12 FL (ref 6–12)
POTASSIUM SERPL-SCNC: 3.9 MMOL/L (ref 3.5–5.2)
PROT SERPL-MCNC: 7.3 G/DL (ref 6–8.5)
RBC # BLD AUTO: 4.4 10*6/MM3 (ref 3.77–5.28)
SODIUM SERPL-SCNC: 138 MMOL/L (ref 136–145)
WBC NRBC COR # BLD: 7.66 10*3/MM3 (ref 3.4–10.8)

## 2023-04-20 RX ORDER — PREDNISONE 10 MG/1
10 TABLET ORAL DAILY PRN
Qty: 30 TABLET | Refills: 2 | Status: SHIPPED | OUTPATIENT
Start: 2023-04-20

## 2023-04-24 ENCOUNTER — OFFICE VISIT (OUTPATIENT)
Dept: FAMILY MEDICINE CLINIC | Facility: CLINIC | Age: 63
End: 2023-04-24
Payer: MEDICAID

## 2023-04-24 VITALS
DIASTOLIC BLOOD PRESSURE: 78 MMHG | OXYGEN SATURATION: 97 % | WEIGHT: 97.6 LBS | BODY MASS INDEX: 16.26 KG/M2 | SYSTOLIC BLOOD PRESSURE: 142 MMHG | HEART RATE: 83 BPM | HEIGHT: 65 IN

## 2023-04-24 DIAGNOSIS — M05.741 RHEUMATOID ARTHRITIS INVOLVING BOTH HANDS WITH POSITIVE RHEUMATOID FACTOR: ICD-10-CM

## 2023-04-24 DIAGNOSIS — R25.2 LEG CRAMPING: ICD-10-CM

## 2023-04-24 DIAGNOSIS — R19.7 DIARRHEA, UNSPECIFIED TYPE: ICD-10-CM

## 2023-04-24 DIAGNOSIS — E03.9 ACQUIRED HYPOTHYROIDISM: ICD-10-CM

## 2023-04-24 DIAGNOSIS — G47.09 OTHER INSOMNIA: ICD-10-CM

## 2023-04-24 DIAGNOSIS — E78.2 MIXED HYPERLIPIDEMIA: ICD-10-CM

## 2023-04-24 DIAGNOSIS — R79.89 ELEVATED LFTS: Primary | ICD-10-CM

## 2023-04-24 DIAGNOSIS — M05.742 RHEUMATOID ARTHRITIS INVOLVING BOTH HANDS WITH POSITIVE RHEUMATOID FACTOR: ICD-10-CM

## 2023-04-24 DIAGNOSIS — F31.31 BIPOLAR AFFECTIVE DISORDER, CURRENTLY DEPRESSED, MILD: ICD-10-CM

## 2023-04-24 DIAGNOSIS — M81.0 AGE-RELATED OSTEOPOROSIS WITHOUT CURRENT PATHOLOGICAL FRACTURE: ICD-10-CM

## 2023-04-24 DIAGNOSIS — F17.210 CIGARETTE NICOTINE DEPENDENCE WITHOUT COMPLICATION: ICD-10-CM

## 2023-04-24 DIAGNOSIS — F41.9 ANXIETY: ICD-10-CM

## 2023-04-24 RX ORDER — DESVENLAFAXINE SUCCINATE 50 MG/1
50 TABLET, EXTENDED RELEASE ORAL DAILY
Qty: 90 TABLET | Refills: 1 | Status: SHIPPED | OUTPATIENT
Start: 2023-04-24

## 2023-04-24 RX ORDER — DIPHENOXYLATE HYDROCHLORIDE AND ATROPINE SULFATE 2.5; .025 MG/1; MG/1
2 TABLET ORAL DAILY
Qty: 60 TABLET | Refills: 5 | Status: SHIPPED | OUTPATIENT
Start: 2023-04-24

## 2023-04-24 RX ORDER — LEVOTHYROXINE SODIUM 0.1 MG/1
100 TABLET ORAL DAILY
Qty: 90 TABLET | Refills: 1 | Status: SHIPPED | OUTPATIENT
Start: 2023-04-24
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
## 2023-05-04 ENCOUNTER — TELEPHONE (OUTPATIENT)
Dept: FAMILY MEDICINE CLINIC | Facility: CLINIC | Age: 63
End: 2023-05-04
Payer: MEDICAID

## 2023-05-04 NOTE — TELEPHONE ENCOUNTER
Attempted to call pt regarding CT, no answer: per verbal left msg for pt to call office.  Pt called office,  was wondering aobut her C5&6 she is having issues, also having left hip issues. Pt was wondering if imaging could be done of these as well, if so she is wanting to wait to schedule them all at the same time.  Please advise

## 2023-05-05 NOTE — TELEPHONE ENCOUNTER
Attempted to call pt with providers response, pt was at work.  Left msg with person who answered the phone to have pt call office  Pt returned call, read pt providers response, pt indicated understanding

## 2023-05-08 ENCOUNTER — OFFICE VISIT (OUTPATIENT)
Dept: FAMILY MEDICINE CLINIC | Facility: CLINIC | Age: 63
End: 2023-05-08
Payer: MEDICAID

## 2023-05-08 VITALS
HEIGHT: 65 IN | DIASTOLIC BLOOD PRESSURE: 76 MMHG | SYSTOLIC BLOOD PRESSURE: 144 MMHG | BODY MASS INDEX: 16.73 KG/M2 | WEIGHT: 100.4 LBS | OXYGEN SATURATION: 98 % | HEART RATE: 85 BPM

## 2023-05-08 DIAGNOSIS — F31.31 BIPOLAR AFFECTIVE DISORDER, CURRENTLY DEPRESSED, MILD: ICD-10-CM

## 2023-05-08 DIAGNOSIS — F41.9 ANXIETY: ICD-10-CM

## 2023-05-08 DIAGNOSIS — K21.9 GASTROESOPHAGEAL REFLUX DISEASE WITHOUT ESOPHAGITIS: ICD-10-CM

## 2023-05-08 DIAGNOSIS — G47.09 OTHER INSOMNIA: ICD-10-CM

## 2023-05-08 DIAGNOSIS — M81.0 AGE-RELATED OSTEOPOROSIS WITHOUT CURRENT PATHOLOGICAL FRACTURE: ICD-10-CM

## 2023-05-08 DIAGNOSIS — E78.2 MIXED HYPERLIPIDEMIA: ICD-10-CM

## 2023-05-08 DIAGNOSIS — E03.9 ACQUIRED HYPOTHYROIDISM: ICD-10-CM

## 2023-05-08 DIAGNOSIS — M47.812 FACET ARTHROPATHY, CERVICAL: ICD-10-CM

## 2023-05-08 DIAGNOSIS — Z00.00 PREVENTATIVE HEALTH CARE: Primary | ICD-10-CM

## 2023-05-08 DIAGNOSIS — G25.81 RLS (RESTLESS LEGS SYNDROME): ICD-10-CM

## 2023-05-08 NOTE — PROGRESS NOTES
Chief Complaint  Chief Complaint   Patient presents with   • Annual Exam   • Hip Pain     Pt having pain in left hip, pt says she thinks this could be related to her arthritis            Subjective          Michell Mckeon presents to [facility] for   History of Present Illness     Pt presents for annual exam    RA, s/s improving on methotrexate, today she complains of L hip pain    Anxiety/depresssion/bipolar d/o, she has several animals that provide her emotional support, she is being evicted from home and trying to move.  She has been stable on current medications    Hyperlipidemia, The patient denies muscle aches, constipation, diarrhea, GI upset, fatigue, chest pain/pressure, exercise intolerance, dyspnea, palpitations, syncope and pedal edema.      GERD, stable using omeprazole only as needed, denies nausea, vomiting, constipation, abdominal pain and diarrhea.    Insomnia, rarely using trazodone, sleeping better    Hypothyroidism, stable on medication, denies symptoms of constipation, weight gain/loss, hot or cold intolerance, hair loss, abnormal heart rate and fatigue.     Osteoporosis, taking alendronate weekly and tolerating well        The following portions of the patient's history were reviewed and updated as appropriate: allergies, current medications, past family history, past medical history, past social history, past surgical history and problem list.    Past Medical History:   Diagnosis Date   • Anemia    • Anxiety    • Arthritis    • Back pain    • Chronic pain    • DDD (degenerative disc disease), cervical    • Depression    • Excessive daytime sleepiness    • Generalized headaches    • Head injury    • High cholesterol    • IBS (irritable bowel syndrome)    • Insomnia    • Neck pain    • Obstructive sleep apnea    • Osteoarthritis    • Osteoarthritis    • Osteoporosis    • Restless leg syndrome    • Rheumatoid arthritis    • Snoring    • Thyroid disease      Past  Surgical History:   Procedure Laterality Date   • BREAST LUMPECTOMY Left     x3   • KNEE SURGERY     • TONSILLECTOMY       Family History   Problem Relation Age of Onset   • Arthritis Mother    • Heart disease Mother    • Hypertension Mother    • Bipolar disorder Son    • OCD Son      Social History     Tobacco Use   • Smoking status: Every Day     Packs/day: 1.00     Years: 40.00     Pack years: 40.00     Types: Cigarettes   • Smokeless tobacco: Never   Substance Use Topics   • Alcohol use: Yes       Current Outpatient Medications:   •  acyclovir (ZOVIRAX) 400 MG tablet, TAKE 1 TABLET BY MOUTH 5 TIMES DAILY FOR 5 DAYS FOR BREAKOUT, OTHERWISE 1 TABLET DAILY., Disp: 50 tablet, Rfl: 2  •  alendronate (Fosamax) 70 MG tablet, 1 tablet by mouth every 7 days on empty stomach, with water only, stay upright for 30 minutes, Disp: 12 tablet, Rfl: 3  •  atorvastatin (LIPITOR) 20 MG tablet, Take 1 tablet by mouth every night at bedtime., Disp: 90 tablet, Rfl: 3  •  azelastine (OPTIVAR) 0.05 % ophthalmic solution, INSTILL 1 DROP INTO BOTH EYES TWICE A DAY, Disp: , Rfl:   •  baclofen (LIORESAL) 10 MG tablet, TAKE 1 TABLET BY MOUTH 3 TIMES A DAY AS NEEDED FOR MUSCLE SPASMS., Disp: 60 tablet, Rfl: 2  •  busPIRone (BUSPAR) 15 MG tablet, Take 1 tablet by mouth 2 (Two) Times a Day., Disp: 180 tablet, Rfl: 3  •  desvenlafaxine (PRISTIQ) 50 MG 24 hr tablet, Take 1 tablet by mouth Daily., Disp: 90 tablet, Rfl: 1  •  diazePAM (VALIUM) 5 MG tablet, TAKE 1/2 TO 1 TABLET BY MOUTH EVERY 12 HOURS AS NEEDED FOR BREAKTHROUGH PANIC/ANXIETY, Disp: 40 tablet, Rfl: 2  •  Diclofenac Sodium (VOLTAREN) 1 % gel gel, APPLY 4 G TOPICALLY TO THE APPROPRIATE AREA AS DIRECTED 4 (FOUR) TIMES A DAY AS NEEDED (PAIN)., Disp: 200 g, Rfl: 1  •  diphenoxylate-atropine (LOMOTIL) 2.5-0.025 MG per tablet, Take 2 tablets by mouth Daily., Disp: 60 tablet, Rfl: 5  •  levothyroxine (SYNTHROID, LEVOTHROID) 100 MCG tablet, Take 1 tablet by mouth Daily., Disp: 90 tablet,  "Rfl: 1  •  meclizine (ANTIVERT) 25 MG tablet, Take 1 tablet by mouth 3 (Three) Times a Day As Needed for Dizziness (vertigo)., Disp: 30 tablet, Rfl: 0  •  methotrexate 2.5 MG tablet, Take 4 tablets by mouth 1 (One) Time Per Week., Disp: 90 tablet, Rfl: 2  •  naproxen (NAPROSYN) 500 MG tablet, Take 1 tablet by mouth 2 (Two) Times a Day With Meals., Disp: 60 tablet, Rfl: 5  •  omeprazole (priLOSEC) 40 MG capsule, Take 1 capsule by mouth Daily., Disp: 90 capsule, Rfl: 1  •  predniSONE (DELTASONE) 10 MG tablet, TAKE 1 TABLET BY MOUTH DAILY AS NEEDED (RHEUMATOID ARTHRITIS FLARE UP)., Disp: 30 tablet, Rfl: 2  •  traZODone (DESYREL) 300 MG tablet, TAKE 1 TABLET BY MOUTH AT NIGHT AS NEEDED FOR SLEEP., Disp: 90 tablet, Rfl: 1    Objective   Vital Signs:   /76 (BP Location: Right arm, Patient Position: Sitting, Cuff Size: Small Adult)   Pulse 85   Ht 165.1 cm (65\")   Wt 45.5 kg (100 lb 6.4 oz)   SpO2 98%   BMI 16.71 kg/m²           Physical Exam  Vitals and nursing note reviewed.   Constitutional:       General: She is not in acute distress.     Appearance: Normal appearance. She is well-developed and underweight. She is not diaphoretic.   Eyes:      Pupils: Pupils are equal, round, and reactive to light.   Neck:      Thyroid: No thyromegaly.      Vascular: No JVD.   Cardiovascular:      Rate and Rhythm: Normal rate and regular rhythm.      Heart sounds: Normal heart sounds. No murmur heard.  Pulmonary:      Effort: Pulmonary effort is normal. No respiratory distress.      Breath sounds: Normal breath sounds. No wheezing or rhonchi.   Abdominal:      General: Bowel sounds are normal. There is no distension.      Palpations: Abdomen is soft.      Tenderness: There is no abdominal tenderness.   Musculoskeletal:         General: Swelling, tenderness (multi joints, hands, knees, hips, elbows, shoulders, good rom) and deformity (B distal mcp joints and mid mcp joints, mild/mod melchor rom or distal joints ) present. Normal " range of motion.      Cervical back: Normal range of motion and neck supple.   Skin:     General: Skin is warm and dry.   Neurological:      General: No focal deficit present.      Mental Status: She is alert and oriented to person, place, and time. Mental status is at baseline.      Sensory: No sensory deficit.   Psychiatric:         Mood and Affect: Mood normal.         Behavior: Behavior normal.         Thought Content: Thought content normal.         Judgment: Judgment normal.          Result Review :     No visits with results within 7 Day(s) from this visit.   Latest known visit with results is:   Clinical Support on 04/17/2023   Component Date Value Ref Range Status   • Glucose 04/17/2023 137 (H)  65 - 99 mg/dL Final   • BUN 04/17/2023 15  8 - 23 mg/dL Final   • Creatinine 04/17/2023 0.73  0.57 - 1.00 mg/dL Final   • Sodium 04/17/2023 138  136 - 145 mmol/L Final   • Potassium 04/17/2023 3.9  3.5 - 5.2 mmol/L Final   • Chloride 04/17/2023 99  98 - 107 mmol/L Final   • CO2 04/17/2023 26.3  22.0 - 29.0 mmol/L Final   • Calcium 04/17/2023 9.5  8.6 - 10.5 mg/dL Final   • Total Protein 04/17/2023 7.3  6.0 - 8.5 g/dL Final   • Albumin 04/17/2023 4.4  3.5 - 5.2 g/dL Final   • ALT (SGPT) 04/17/2023 43 (H)  1 - 33 U/L Final   • AST (SGOT) 04/17/2023 63 (H)  1 - 32 U/L Final   • Alkaline Phosphatase 04/17/2023 79  39 - 117 U/L Final   • Total Bilirubin 04/17/2023 0.3  0.0 - 1.2 mg/dL Final   • Globulin 04/17/2023 2.9  gm/dL Final   • A/G Ratio 04/17/2023 1.5  g/dL Final   • BUN/Creatinine Ratio 04/17/2023 20.5  7.0 - 25.0 Final   • Anion Gap 04/17/2023 12.7  5.0 - 15.0 mmol/L Final   • eGFR 04/17/2023 92.5  >60.0 mL/min/1.73 Final   • Rheumatoid Factor Quantitative 04/17/2023 42.0 (H)  0.0 - 14.0 IU/mL Final   • WBC 04/17/2023 7.66  3.40 - 10.80 10*3/mm3 Final   • RBC 04/17/2023 4.40  3.77 - 5.28 10*6/mm3 Final   • Hemoglobin 04/17/2023 14.2  12.0 - 15.9 g/dL Final   • Hematocrit 04/17/2023 42.0  34.0 - 46.6 % Final   •  MCV 04/17/2023 95.5  79.0 - 97.0 fL Final   • MCH 04/17/2023 32.3  26.6 - 33.0 pg Final   • MCHC 04/17/2023 33.8  31.5 - 35.7 g/dL Final   • RDW 04/17/2023 13.0  12.3 - 15.4 % Final   • RDW-SD 04/17/2023 44.9  37.0 - 54.0 fl Final   • MPV 04/17/2023 12.0  6.0 - 12.0 fL Final   • Platelets 04/17/2023 142  140 - 450 10*3/mm3 Final   • Neutrophil % 04/17/2023 80.1 (H)  42.7 - 76.0 % Final   • Lymphocyte % 04/17/2023 14.8 (L)  19.6 - 45.3 % Final   • Monocyte % 04/17/2023 3.7 (L)  5.0 - 12.0 % Final   • Eosinophil % 04/17/2023 0.5  0.3 - 6.2 % Final   • Basophil % 04/17/2023 0.4  0.0 - 1.5 % Final   • Immature Grans % 04/17/2023 0.5  0.0 - 0.5 % Final   • Neutrophils, Absolute 04/17/2023 6.14  1.70 - 7.00 10*3/mm3 Final   • Lymphocytes, Absolute 04/17/2023 1.13  0.70 - 3.10 10*3/mm3 Final   • Monocytes, Absolute 04/17/2023 0.28  0.10 - 0.90 10*3/mm3 Final   • Eosinophils, Absolute 04/17/2023 0.04  0.00 - 0.40 10*3/mm3 Final   • Basophils, Absolute 04/17/2023 0.03  0.00 - 0.20 10*3/mm3 Final   • Immature Grans, Absolute 04/17/2023 0.04  0.00 - 0.05 10*3/mm3 Final   • nRBC 04/17/2023 0.0  0.0 - 0.2 /100 WBC Final                  BMI is below normal parameters (malnutrition). Recommendations: Increase protein and calories in the diet           Assessment and Plan    Diagnoses and all orders for this visit:    1. Preventative health care (Primary)    2. Anxiety    3. Bipolar affective disorder, currently depressed, mild    4. Acquired hypothyroidism    5. Age-related osteoporosis without current pathological fracture    6. Other insomnia    7. Mixed hyperlipidemia    8. Facet arthropathy, cervical    9. RLS (restless legs syndrome)    10. Gastroesophageal reflux disease without esophagitis         Condition stable  Continue current medication regimen  Labs reviewed with patient at previous visit  Ok for emotional support animals, 4 cats and 1 dog  Recommend shingles vaccine  Recommend Pap smear  Mammogram and DEXA scan  up-to-date November 2022  Age appropriate preventative counseling provided, including healthy lifestyle modifications and exercise      I spent 30 minutes caring for Michell Mckeon on this date of service. This time includes time spent by me in the following activities: preparing for the visit, reviewing tests, performing a medically appropriate examination and/or evaluation , counseling and educating the patient/family/caregiver, ordering medications, tests, or procedures and documenting information in the medical record        Follow Up     Return for Next scheduled follow up october as scheduled.  Patient was given instructions and counseling regarding her condition or for health maintenance advice. Please see specific information pulled into the AVS if appropriate.        Part of this note may be an electronic transcription/translation of spoken language to printed text using the Dragon Dictation System

## 2023-05-09 ENCOUNTER — TELEPHONE (OUTPATIENT)
Dept: FAMILY MEDICINE CLINIC | Facility: CLINIC | Age: 63
End: 2023-05-09
Payer: MEDICAID

## 2023-05-09 NOTE — TELEPHONE ENCOUNTER
Called and left detailed message regarding CT chest for lung cancer screening order. Gave OR phone number to call and schedule.

## 2023-05-10 ENCOUNTER — TELEPHONE (OUTPATIENT)
Dept: FAMILY MEDICINE CLINIC | Facility: CLINIC | Age: 63
End: 2023-05-10

## 2023-05-10 NOTE — TELEPHONE ENCOUNTER
Caller: Michell Mckeon    Relationship: Self    Best call back number: [phone]    What is the best time to reach you: ANY TIME    Who are you requesting to speak with (clinical staff, provider,  specific staff member): CLINICAL STAFF    What was the call regarding: PATIENT CALLED TO LET TAI KNOW SHE WAS APPROVED FOR CHEST CT LOW DOSE AND SHE IS SCHEDULED TO GET THIS DONE 5/24/23 AT 3 PM, SHE WILL ALSO BE GETTTING THE HAND XRAY THAT WAS ORDERED IN JANUARY    Do you require a callback: IF ANY QUESTIONS

## 2023-05-11 ENCOUNTER — TELEPHONE (OUTPATIENT)
Dept: FAMILY MEDICINE CLINIC | Facility: CLINIC | Age: 63
End: 2023-05-11
Payer: MEDICAID

## 2023-05-11 NOTE — TELEPHONE ENCOUNTER
Caller: Michell Mckeon    Relationship: Self    Best call back number: [phone]    What was the call regarding: PATIENT WOULD LIKE TO KNOW WHAT WEB SITE SHE NEEDS TO GO ON TO COMPLETE THE APPLICATION FOR HER EMOTIONAL SUPPORT ANIMAL.     PLEASE CALL TO DISCUSS AND ADVISE.

## 2023-05-15 NOTE — TELEPHONE ENCOUNTER
Caller: Michell Mckeon    Relationship to patient: Self    Best call back number: [phone] AFTER 3:30    Patient is needing: PATIENT WOULD LIKE TO KNOW IF THERE IS SOMETHING SHE CAN TAKE TO HELP HER NOT MAKE FISTS AT NIGHT WHILE SHE SLEEPS.  ALSO, SHE NEEDS A LETTER FROM THE  STATING THAT SHE NEEDS HER DOG AS AN EMOTIONAL SUPPORT ANIMAL.  SHE FILLED OUT THE APPLICATION ON THE WEB SITE

## 2023-05-22 ENCOUNTER — TELEPHONE (OUTPATIENT)
Dept: FAMILY MEDICINE CLINIC | Facility: CLINIC | Age: 63
End: 2023-05-22

## 2023-05-22 NOTE — TELEPHONE ENCOUNTER
Patient:   EUGENIO ZAIDI            MRN: [id number]            FIN: [id number]               Age:   11 months     Sex:  FEMALE     :  18   Associated Diagnoses:   CAP (community acquired pneumonia); Fever; Influenza A   Author:   BERNARD CARRERA      Discharge Information   Discharge Summary Information:  Admitted  2019, Discharged  2019.         Admitting physician: ANGELIA MONREAL.         Admitting diagnosis: CAP (community acquired pneumonia) (TSL58-TM J18.9, Discharge, Medical), Complaint of Fever (AAN74-JU R50.9, Diagnosis, Hospitalized, Medical), Influenza A (QPA03-PP J10.1, Discharge, Medical).         Discharge diagnosis: CAP (community acquired pneumonia) (UYN23-NI J18.9, Discharge, Medical), Complaint of Fever (TFF48-PQ R50.9, Diagnosis, Hospitalized, Medical).         Discharge medications: OTHER MEDICATIONS (Selected)   Prescriptions  Prescribed  amoxicillin 400 mg/5 mL oral liquid: 400 mg = 5 mL, Oral, Q12H, # 100 mL, 0 Refills, Maintenance, Pharmacy: [facility] [id number]  Documented Medications  Documented  acetaminophen 160 mg/5 mL oral suspension: 128 mg = 4 mL, Oral, Q4H, PRN as needed for fever, Suspension, # 120 mL, 0 Refills, Maintenance.       Physical Examination   VS/Measurements        Vitals between:   05-MAR-2019 09:56:42   TO   06-MAR-2019 09:56:42                   LAST RESULT MINIMUM MAXIMUM  Temperature 36.1 36 37.2  Heart Rate 138 99 142  Respiratory Rate 32 28 44  NISBP           101 95 126  NIDBP           56 55 92  NIMBP           71 68 104  SpO2                    95 94 96          Physical Examination   General:  No acute distress.    Eye:  Pupils are equal, round and reactive to light, Extraocular movements are intact, Normal conjunctiva.    HENT:  Normocephalic, Oral mucosa is moist, MMM pink, no nasal flaring, TM's dull bilaterally, no erythema, no effusions  Neck:  Supple.    Respiratory:  Lungs are clear to auscultation, Respirations are  Caller: Michell Mckeon     Relationship: Self    Best call back number: [phone]    PATIENT IS NEEDING A NURSE TO PLEASE CALL HER BACK REGARDING STOMACH PAINS AND MEDICATIONS SHE CURRENTLY TAKES.       non-labored, Breath sounds are equal.    Cardiovascular:  Normal rate, Regular rhythm, No murmur, Normal peripheral perfusion.    Gastrointestinal:  Soft, Non-tender, Normal bowel sounds, No organomegaly.    Musculoskeletal:  Normal range of motion, Normal strength.    Integumentary:  Warm, Dry, Pink.    Neurologic:  Alert.              Results Review   General results   Hospital Course    The patient is a previously healthy full term 11m F who presents with fever, cough and rash x5 days.     Rosamaria developed fever, cough, congestion and rash on 5 days prior to admission. Rash improved, but she continued to experience cough and fever.  Seen in the ER and PCPs office. PCP thought that the rash could be varicella though Mom denies any sick contacts or anyone with chicken pox.     On the day of admission, Rosamaria had decreased PO intake and continued fevers which prompted mom to bring her into the ED.   In the ED, she was febrile and tachycardic.   Labs notable for WBC 37. CXR concerning for RLL PNA.     Rosamaria was given Ceftriaxone 50mg/kg, NS bolus and tylenol.         Chest x-ray was significant for bilateral lower lobe infiltrate.  Rapid flu is negative.    Appetite is decreased, but urine output apparently good  No apparent ear pain, sore throat.   No vomiting/diarrhea.   No changes in mental status or alteration in behavior.          Patient sent to the Peds floor for further care.    Hospital Course:     FEN--  Hydration was supported, IV and PO. Appetite improved .   Weaned from IV, Her po intake improved.  His urine output was adequate throughout hospitalization.    Resp/ID--Pneumonia  Patient was stable from a respiratory standpoint thoughout hospitalization  Patient was dx'd with pneumonia by x-ray--bilateral lower lobe infiltrate  O2 support by simple nasal cannula provided, with a peak of 1 L/min.  Good saturations were seen off of oxygen times 1 day.  Defervesced after one days IV ATB's.  Patient   remains afebrile at discharge.  Patient was stable from a respiratory standpoint at discharge    CV--  Patient was stable from a cardiovascular standpoint thoughout hospitalization    GI--  Patient was stable from a GI standpoint thoughout hospitalization.       Neuro--  Patient was stable from a neurologic standpoint thoughout hospitalization      Renal--  Patient was stable from a renal standpoint thoughout hospitalization      ID/Resp--Pneumonia  Patient was stable from a respiratory standpoint thoughout hospitalization  Patient was dx'd with pneumonia by x-ray--left lower lobe infiltrate  Defervesced after two days IV ATB's.  Patient  remains afebrile at discharge.  Patient was stable from a respiratory standpoint at discharge           Discharge Plan   Discharge Summary Plan   Discharge Status: improved.     Discharge instructions given: to caregiver, to legal guardian.     Discharge disposition: discharge to home into the care of family member.     Prescriptions: written and given to patient.       Orders     Patient appears to be in good condition, and so is sent home. .        Course   Improving.       Education and Follow-up   Counseled: family, regarding diagnosis, regarding treatment, regarding medications.     Discharge Planning: Follow-up.   PMD 1-3d                 Electronically Signed On 03/06/2019 10:47  __________________________________________________   BERNARD CARRERA

## 2023-05-24 NOTE — TELEPHONE ENCOUNTER
Spoke with Michell. She states that she takes alendronate on Sunday and does not take her prilosec as she was notified there was interaction if she takes them on the same day. She reports that because she doesn't take her prilosec on Sunday, she has bad heartburn, nausea and occ. Vomiting only on Monday. Please advise.

## 2023-05-26 ENCOUNTER — TELEPHONE (OUTPATIENT)
Dept: FAMILY MEDICINE CLINIC | Facility: CLINIC | Age: 63
End: 2023-05-26

## 2023-05-26 NOTE — TELEPHONE ENCOUNTER
Caller: Michell Mckeon    Relationship: Self    Best call back number: [phone]    What was the call regarding: PATIENT STATED SHE HAS BEEN HAVING PAIN IN HER RIGHT ANKLE FOR 3 WEEKS AND NOW THE ANKLE IS SWELLING.     PATIENT WOULD LIKE TO KNOW IF TAIDESHAWN [facility]IVERN WOULD LIKE TO SEE HER IN OFFICE FIRST OR IF AN XRAY ORDER CAN BE SENT TO [facility].     PLEASE CALL TO DISCUSS AND ADVISE.

## 2023-05-30 NOTE — TELEPHONE ENCOUNTER
Spoke with Michell. Informed her that first available was Aug. Stated that she cant wait that long. Wants to rule out blood clot. Informed her would have to go to ER to be evaluated. Expressed understanding.

## 2023-05-30 NOTE — TELEPHONE ENCOUNTER
Called to get pt scheduled, woman answered and said pt is at work but she will have pt return call to schedule

## 2023-06-05 ENCOUNTER — TELEPHONE (OUTPATIENT)
Dept: PEDIATRICS | Facility: OTHER | Age: 63
End: 2023-06-05
Payer: MEDICAID

## 2023-06-05 ENCOUNTER — TELEPHONE (OUTPATIENT)
Dept: FAMILY MEDICINE CLINIC | Facility: CLINIC | Age: 63
End: 2023-06-05
Payer: MEDICAID

## 2023-06-05 NOTE — TELEPHONE ENCOUNTER
Pt called in requesting a letter for emotional support animal for 4 cats. Pt states that she is needing for residence she will be moving into. Pt is wondering if she would be able to  letter today after she gets off work at 2:30. Please advise

## 2023-07-26 DIAGNOSIS — F41.9 ANXIETY: ICD-10-CM

## 2023-07-26 RX ORDER — DIAZEPAM 5 MG/1
TABLET ORAL
Qty: 30 TABLET | Refills: 2 | Status: SHIPPED | OUTPATIENT
Start: 2023-07-26

## 2023-08-22 ENCOUNTER — TELEPHONE (OUTPATIENT)
Dept: FAMILY MEDICINE CLINIC | Facility: CLINIC | Age: 63
End: 2023-08-22
Payer: MEDICAID

## 2023-08-22 NOTE — TELEPHONE ENCOUNTER
Caller: Michell Mckeon    Relationship to patient: Self    Best call back number: [phone]    Patient is needing:     IS EXPERIENCING VERY SWOLLEN PAINFUL GLANDS ON HER NECK.    WOULD LIKE AN APPT BUT COULDN'T FIND ANY OPENINGS.     PLEASE ADVISE.

## 2023-08-28 ENCOUNTER — TELEPHONE (OUTPATIENT)
Dept: FAMILY MEDICINE CLINIC | Facility: CLINIC | Age: 63
End: 2023-08-28
Payer: MEDICAID

## 2023-08-28 DIAGNOSIS — R19.7 DIARRHEA, UNSPECIFIED TYPE: ICD-10-CM

## 2023-08-28 RX ORDER — DIPHENOXYLATE HYDROCHLORIDE AND ATROPINE SULFATE 2.5; .025 MG/1; MG/1
2 TABLET ORAL 4 TIMES DAILY PRN
Qty: 120 TABLET | Refills: 5 | Status: SHIPPED | OUTPATIENT
Start: 2023-08-28

## 2023-08-28 NOTE — TELEPHONE ENCOUNTER
Caller: Michell Mckeon    Relationship: Self    Best call back number: [phone]    What medication are you requesting: LOMOTIL 4 TIMES A DAY     What are your current symptoms: WATERY BOWEL MOVEMENTS    How long have you been experiencing symptoms:     Have you had these symptoms before:    [x] Yes  [] No    Have you been treated for these symptoms before:   [x] Yes  [] No    If a prescription is needed, what is your preferred pharmacy and phone number: [facility]/PHARMACY #[id number] - [place], [place] - [address] - [phone]  - [phone]     Additional notes:

## 2023-09-28 DIAGNOSIS — K21.9 GASTROESOPHAGEAL REFLUX DISEASE WITHOUT ESOPHAGITIS: ICD-10-CM

## 2023-09-29 RX ORDER — OMEPRAZOLE 40 MG/1
CAPSULE, DELAYED RELEASE ORAL
Qty: 90 CAPSULE | Refills: 1 | Status: SHIPPED | OUTPATIENT
Start: 2023-09-29

## 2023-10-02 ENCOUNTER — PRIOR AUTHORIZATION (OUTPATIENT)
Dept: FAMILY MEDICINE CLINIC | Facility: CLINIC | Age: 63
End: 2023-10-02
Payer: MEDICAID

## 2023-10-13 DIAGNOSIS — M19.90 OSTEOARTHRITIS, UNSPECIFIED OSTEOARTHRITIS TYPE, UNSPECIFIED SITE: ICD-10-CM

## 2023-10-13 DIAGNOSIS — E03.9 ACQUIRED HYPOTHYROIDISM: Primary | ICD-10-CM

## 2023-10-13 DIAGNOSIS — E78.2 MIXED HYPERLIPIDEMIA: ICD-10-CM

## 2023-10-19 ENCOUNTER — TELEPHONE (OUTPATIENT)
Dept: FAMILY MEDICINE CLINIC | Facility: CLINIC | Age: 63
End: 2023-10-19
Payer: MEDICAID

## 2023-10-19 NOTE — TELEPHONE ENCOUNTER
Left a message for patient to let her know this medication was approved and she should be able to pick it up. Advised to call with any questions.

## 2023-10-19 NOTE — TELEPHONE ENCOUNTER
Caller: Michell Mckeon    Relationship to patient: Self    Best call back number: [phone]    Patient is needing:     PATIENT CALLED AND SAID THAT [facility] IS TELLING HER THAT SHE NEEDS A PRIOR AUTHORIZATION FOR OMEPRAZOLE    SHE SAID THAT SHE IS NOT SURE WHY IT IS NEEDED, SHE HAS AUTO REFILL AND THEY TOLD HER THEY COULD NOT REFILL IT DUE TO THIS

## 2023-10-22 DIAGNOSIS — F41.9 ANXIETY: ICD-10-CM

## 2023-10-23 RX ORDER — DIAZEPAM 5 MG/1
TABLET ORAL
Qty: 30 TABLET | Refills: 2 | Status: SHIPPED | OUTPATIENT
Start: 2023-10-23

## 2023-10-24 ENCOUNTER — OFFICE VISIT (OUTPATIENT)
Dept: FAMILY MEDICINE CLINIC | Facility: CLINIC | Age: 63
End: 2023-10-24
Payer: MEDICAID

## 2023-10-24 VITALS
SYSTOLIC BLOOD PRESSURE: 158 MMHG | BODY MASS INDEX: 16.16 KG/M2 | OXYGEN SATURATION: 96 % | HEART RATE: 79 BPM | WEIGHT: 97 LBS | TEMPERATURE: 97.8 F | RESPIRATION RATE: 18 BRPM | HEIGHT: 65 IN | DIASTOLIC BLOOD PRESSURE: 93 MMHG

## 2023-10-24 DIAGNOSIS — G47.09 OTHER INSOMNIA: ICD-10-CM

## 2023-10-24 DIAGNOSIS — F41.9 ANXIETY: ICD-10-CM

## 2023-10-24 DIAGNOSIS — E03.9 ACQUIRED HYPOTHYROIDISM: Primary | ICD-10-CM

## 2023-10-24 DIAGNOSIS — E78.2 MIXED HYPERLIPIDEMIA: ICD-10-CM

## 2023-10-24 DIAGNOSIS — Z23 FLU VACCINE NEED: ICD-10-CM

## 2023-10-24 DIAGNOSIS — K21.9 GASTROESOPHAGEAL REFLUX DISEASE WITHOUT ESOPHAGITIS: ICD-10-CM

## 2023-10-24 DIAGNOSIS — F31.31 BIPOLAR AFFECTIVE DISORDER, CURRENTLY DEPRESSED, MILD: ICD-10-CM

## 2023-10-24 DIAGNOSIS — M05.742 RHEUMATOID ARTHRITIS INVOLVING BOTH HANDS WITH POSITIVE RHEUMATOID FACTOR: ICD-10-CM

## 2023-10-24 DIAGNOSIS — M05.741 RHEUMATOID ARTHRITIS INVOLVING BOTH HANDS WITH POSITIVE RHEUMATOID FACTOR: ICD-10-CM

## 2023-10-24 DIAGNOSIS — M81.0 AGE-RELATED OSTEOPOROSIS WITHOUT CURRENT PATHOLOGICAL FRACTURE: ICD-10-CM

## 2023-10-24 PROCEDURE — 80050 GENERAL HEALTH PANEL: CPT | Performed by: NURSE PRACTITIONER

## 2023-10-24 PROCEDURE — 80061 LIPID PANEL: CPT | Performed by: NURSE PRACTITIONER

## 2023-10-24 PROCEDURE — 86431 RHEUMATOID FACTOR QUANT: CPT | Performed by: NURSE PRACTITIONER

## 2023-10-24 RX ORDER — DESVENLAFAXINE SUCCINATE 50 MG/1
50 TABLET, EXTENDED RELEASE ORAL DAILY
Qty: 90 TABLET | Refills: 1 | Status: SHIPPED | OUTPATIENT
Start: 2023-10-24

## 2023-10-24 RX ORDER — LEVOTHYROXINE SODIUM 0.1 MG/1
100 TABLET ORAL DAILY
Qty: 90 TABLET | Refills: 1 | Status: SHIPPED | OUTPATIENT
Start: 2023-10-24

## 2023-10-24 RX ORDER — METHOTREXATE 2.5 MG/1
10 TABLET ORAL WEEKLY
Qty: 48 TABLET | Refills: 1 | Status: SHIPPED | OUTPATIENT
Start: 2023-10-24

## 2023-10-24 NOTE — PROGRESS NOTES
Chief Complaint  Chief Complaint   Patient presents with    Rheumatoid Arthritis    Hypothyroidism    Anxiety    Hyperlipidemia    Neck Pain    Insomnia           Subjective          Michell Mckeon presents to [facility] for   History of Present Illness    RA, s/s stable, pain has improved on methotrexate, she c/o hand pain on daily basis but stable and much improved.      Anxiety/depresssion/bipolar d/o, she has been stable, has several animals that provide her emotional support, she is moving into a new home. She has been stable on current medications, only using diazepam as needed. Her son is difficult, disrespectful, in/out of rehab for drugs.      Hyperlipidemia, The patient denies muscle aches, constipation, diarrhea, GI upset, fatigue, chest pain/pressure, exercise intolerance, dyspnea, palpitations, syncope and pedal edema.       GERD, stable using omeprazole daily, denies nausea, vomiting, constipation, abdominal pain and diarrhea.     Insomnia, stable, rarely using trazodone    Hypothyroidism, stable on medication, denies symptoms of constipation, weight gain/loss, hot or cold intolerance, hair loss, abnormal heart rate and fatigue.      Osteoporosis, taking alendronate weekly and tolerating well      The following portions of the patient's history were reviewed and updated as appropriate: allergies, current medications, past family history, past medical history, past social history, past surgical history and problem list.    Past Medical History:   Diagnosis Date    Anemia     Anxiety     Arthritis     Back pain     Chronic pain     DDD (degenerative disc disease), cervical     Depression     Excessive daytime sleepiness     Generalized headaches     Head injury     High cholesterol     IBS (irritable bowel syndrome)     Insomnia     Neck pain     Obstructive sleep apnea     Osteoarthritis     Osteoarthritis     Osteoporosis     Restless leg syndrome     Rheumatoid arthritis      Snoring     Thyroid disease      Past Surgical History:   Procedure Laterality Date    BREAST LUMPECTOMY Left     x3    KNEE SURGERY      TONSILLECTOMY       Family History   Problem Relation Age of Onset    Arthritis Mother     Heart disease Mother     Hypertension Mother     Bipolar disorder Son     OCD Son      Social History     Tobacco Use    Smoking status: Every Day     Packs/day: 1.00     Years: 40.00     Additional pack years: 0.00     Total pack years: 40.00     Types: Cigarettes    Smokeless tobacco: Never   Substance Use Topics    Alcohol use: Yes       Current Outpatient Medications:     acyclovir (ZOVIRAX) 400 MG tablet, TAKE 1 TABLET BY MOUTH 5 TIMES DAILY FOR 5 DAYS FOR BREAKOUT, OTHERWISE 1 TABLET DAILY., Disp: 50 tablet, Rfl: 2    alendronate (Fosamax) 70 MG tablet, 1 tablet by mouth every 7 days on empty stomach, with water only, stay upright for 30 minutes, Disp: 12 tablet, Rfl: 3    atorvastatin (LIPITOR) 20 MG tablet, Take 1 tablet by mouth every night at bedtime., Disp: 90 tablet, Rfl: 3    azelastine (OPTIVAR) 0.05 % ophthalmic solution, INSTILL 1 DROP INTO BOTH EYES TWICE A DAY, Disp: , Rfl:     baclofen (LIORESAL) 10 MG tablet, TAKE 1 TABLET BY MOUTH 3 TIMES A DAY AS NEEDED FOR MUSCLE SPASMS., Disp: 60 tablet, Rfl: 2    busPIRone (BUSPAR) 15 MG tablet, Take 1 tablet by mouth 2 (Two) Times a Day., Disp: 180 tablet, Rfl: 3    desvenlafaxine (PRISTIQ) 50 MG 24 hr tablet, Take 1 tablet by mouth Daily., Disp: 90 tablet, Rfl: 1    diazePAM (VALIUM) 5 MG tablet, TAKE 1/2 TO 1 TABLET BY MOUTH EVERY 12 HOURS AS NEEDED FOR BREAKTHROUGH PANIC/ANXIETY, Disp: 30 tablet, Rfl: 2    diphenoxylate-atropine (LOMOTIL) 2.5-0.025 MG per tablet, Take 2 tablets by mouth 4 (Four) Times a Day As Needed for Diarrhea., Disp: 120 tablet, Rfl: 5    levothyroxine (SYNTHROID, LEVOTHROID) 100 MCG tablet, Take 1 tablet by mouth Daily., Disp: 90 tablet, Rfl: 1    meclizine (ANTIVERT) 25 MG tablet, Take 1 tablet by mouth  "3 (Three) Times a Day As Needed for Dizziness (vertigo)., Disp: 30 tablet, Rfl: 0    methotrexate 2.5 MG tablet, Take 4 tablets by mouth 1 (One) Time Per Week., Disp: 48 tablet, Rfl: 1    naproxen (NAPROSYN) 500 MG tablet, Take 1 tablet by mouth 2 (Two) Times a Day With Meals., Disp: 60 tablet, Rfl: 5    omeprazole (priLOSEC) 40 MG capsule, TAKE 1 CAPSULE BY MOUTH EVERY DAY, Disp: 90 capsule, Rfl: 1    predniSONE (DELTASONE) 10 MG tablet, TAKE 1 TABLET BY MOUTH DAILY AS NEEDED (RHEUMATOID ARTHRITIS FLARE UP)., Disp: 30 tablet, Rfl: 2    traZODone (DESYREL) 300 MG tablet, TAKE 1 TABLET BY MOUTH AT NIGHT AS NEEDED FOR SLEEP., Disp: 90 tablet, Rfl: 1    Objective   Vital Signs:   /93   Pulse 79   Temp 97.8 °F (36.6 °C) (Temporal)   Resp 18   Ht 165.1 cm (65\")   Wt 44 kg (97 lb)   SpO2 96%   BMI 16.14 kg/m²           Physical Exam  Constitutional:       General: She is not in acute distress.     Appearance: Normal appearance. She is well-developed. She is not ill-appearing or diaphoretic.   HENT:      Head: Normocephalic.   Eyes:      Conjunctiva/sclera: Conjunctivae normal.      Pupils: Pupils are equal, round, and reactive to light.   Neck:      Thyroid: No thyromegaly.      Vascular: No JVD.   Cardiovascular:      Rate and Rhythm: Normal rate and regular rhythm.      Heart sounds: Normal heart sounds. No murmur heard.  Pulmonary:      Effort: Pulmonary effort is normal. No respiratory distress.      Breath sounds: Normal breath sounds. No wheezing or rhonchi.   Abdominal:      General: Bowel sounds are normal. There is no distension.      Palpations: Abdomen is soft.      Tenderness: There is no abdominal tenderness.   Musculoskeletal:         General: Tenderness (RA nodules of hands, mild melchor rom) present. No swelling. Normal range of motion.      Cervical back: Normal range of motion and neck supple. No tenderness.   Lymphadenopathy:      Cervical: No cervical adenopathy.   Skin:     General: Skin is " warm and dry.      Coloration: Skin is not jaundiced.      Findings: No erythema or rash.   Neurological:      General: No focal deficit present.      Mental Status: She is alert and oriented to person, place, and time. Mental status is at baseline.      Sensory: No sensory deficit.   Psychiatric:         Mood and Affect: Mood normal.         Behavior: Behavior normal.         Thought Content: Thought content normal.         Judgment: Judgment normal.          Result Review :     No visits with results within 7 Day(s) from this visit.   Latest known visit with results is:   Clinical Support on 04/17/2023   Component Date Value Ref Range Status    Glucose 04/17/2023 137 (H)  65 - 99 mg/dL Final    BUN 04/17/2023 15  8 - 23 mg/dL Final    Creatinine 04/17/2023 0.73  0.57 - 1.00 mg/dL Final    Sodium 04/17/2023 138  136 - 145 mmol/L Final    Potassium 04/17/2023 3.9  3.5 - 5.2 mmol/L Final    Chloride 04/17/2023 99  98 - 107 mmol/L Final    CO2 04/17/2023 26.3  22.0 - 29.0 mmol/L Final    Calcium 04/17/2023 9.5  8.6 - 10.5 mg/dL Final    Total Protein 04/17/2023 7.3  6.0 - 8.5 g/dL Final    Albumin 04/17/2023 4.4  3.5 - 5.2 g/dL Final    ALT (SGPT) 04/17/2023 43 (H)  1 - 33 U/L Final    AST (SGOT) 04/17/2023 63 (H)  1 - 32 U/L Final    Alkaline Phosphatase 04/17/2023 79  39 - 117 U/L Final    Total Bilirubin 04/17/2023 0.3  0.0 - 1.2 mg/dL Final    Globulin 04/17/2023 2.9  gm/dL Final    A/G Ratio 04/17/2023 1.5  g/dL Final    BUN/Creatinine Ratio 04/17/2023 20.5  7.0 - 25.0 Final    Anion Gap 04/17/2023 12.7  5.0 - 15.0 mmol/L Final    eGFR 04/17/2023 92.5  >60.0 mL/min/1.73 Final    Rheumatoid Factor Quantitative 04/17/2023 42.0 (H)  0.0 - 14.0 IU/mL Final    WBC 04/17/2023 7.66  3.40 - 10.80 10*3/mm3 Final    RBC 04/17/2023 4.40  3.77 - 5.28 10*6/mm3 Final    Hemoglobin 04/17/2023 14.2  12.0 - 15.9 g/dL Final    Hematocrit 04/17/2023 42.0  34.0 - 46.6 % Final    MCV 04/17/2023 95.5  79.0 - 97.0 fL Final    MCH  04/17/2023 32.3  26.6 - 33.0 pg Final    MCHC 04/17/2023 33.8  31.5 - 35.7 g/dL Final    RDW 04/17/2023 13.0  12.3 - 15.4 % Final    RDW-SD 04/17/2023 44.9  37.0 - 54.0 fl Final    MPV 04/17/2023 12.0  6.0 - 12.0 fL Final    Platelets 04/17/2023 142  140 - 450 10*3/mm3 Final    Neutrophil % 04/17/2023 80.1 (H)  42.7 - 76.0 % Final    Lymphocyte % 04/17/2023 14.8 (L)  19.6 - 45.3 % Final    Monocyte % 04/17/2023 3.7 (L)  5.0 - 12.0 % Final    Eosinophil % 04/17/2023 0.5  0.3 - 6.2 % Final    Basophil % 04/17/2023 0.4  0.0 - 1.5 % Final    Immature Grans % 04/17/2023 0.5  0.0 - 0.5 % Final    Neutrophils, Absolute 04/17/2023 6.14  1.70 - 7.00 10*3/mm3 Final    Lymphocytes, Absolute 04/17/2023 1.13  0.70 - 3.10 10*3/mm3 Final    Monocytes, Absolute 04/17/2023 0.28  0.10 - 0.90 10*3/mm3 Final    Eosinophils, Absolute 04/17/2023 0.04  0.00 - 0.40 10*3/mm3 Final    Basophils, Absolute 04/17/2023 0.03  0.00 - 0.20 10*3/mm3 Final    Immature Grans, Absolute 04/17/2023 0.04  0.00 - 0.05 10*3/mm3 Final    nRBC 04/17/2023 0.0  0.0 - 0.2 /100 WBC Final                              Assessment and Plan    Diagnoses and all orders for this visit:    1. Acquired hypothyroidism (Primary)  Comments:  TSH today  Continue and refill levothyroxine, adjust if needed  Orders:  -     levothyroxine (SYNTHROID, LEVOTHROID) 100 MCG tablet; Take 1 tablet by mouth Daily.  Dispense: 90 tablet; Refill: 1    2. Flu vaccine need  -     Fluzone >6 Months ([id number])    3. Anxiety  Comments:  Stable, continue Pristiq daily and diazepam as needed    4. Other insomnia  Comments:  Stable, okay to continue trazodone as needed    5. Gastroesophageal reflux disease without esophagitis  Comments:  Stable, continue omeprazole    6. Age-related osteoporosis without current pathological fracture  Comments:  Continue alendronate  DEXA due 2025    7. Mixed hyperlipidemia  Comments:  Lipids today, continue atorvastatin    8. Bipolar affective disorder, currently  depressed, mild  Comments:  Stable on Pristiq    9. Rheumatoid arthritis involving both hands with positive rheumatoid factor  Comments:  Symptoms stable and nearly resolved, continue methotrexate weekly  Has prednisone to use if needed for flareup    Other orders  -     desvenlafaxine (PRISTIQ) 50 MG 24 hr tablet; Take 1 tablet by mouth Daily.  Dispense: 90 tablet; Refill: 1  -     methotrexate 2.5 MG tablet; Take 4 tablets by mouth 1 (One) Time Per Week.  Dispense: 48 tablet; Refill: 1        I spent 30 minutes caring for Michell Mckeon on this date of service. This time includes time spent by me in the following activities: preparing for the visit, reviewing tests, performing a medically appropriate examination and/or evaluation , counseling and educating the patient/family/caregiver, ordering medications, tests, or procedures and documenting information in the medical record        Follow Up     Return in about 6 months (around 4/24/2024) for Recheck.  Patient was given instructions and counseling regarding her condition or for health maintenance advice. Please see specific information pulled into the AVS if appropriate.        Part of this note may be an electronic transcription/translation of spoken language to printed text using the Dragon Dictation System

## 2023-10-24 NOTE — PROGRESS NOTES
Venipuncture Blood Specimen Collection  Venipuncture performed in the right arm by Shanika Torres MA with good hemostasis. Patient tolerated the procedure well without complications.   10/24/23   Shanika Torres MA

## 2023-10-25 LAB
ALBUMIN SERPL-MCNC: 4.3 G/DL (ref 3.5–5.2)
ALBUMIN/GLOB SERPL: 1.4 G/DL
ALP SERPL-CCNC: 92 U/L (ref 39–117)
ALT SERPL W P-5'-P-CCNC: 27 U/L (ref 1–33)
ANION GAP SERPL CALCULATED.3IONS-SCNC: 11 MMOL/L (ref 5–15)
AST SERPL-CCNC: 50 U/L (ref 1–32)
BILIRUB SERPL-MCNC: 0.5 MG/DL (ref 0–1.2)
BUN SERPL-MCNC: 15 MG/DL (ref 8–23)
BUN/CREAT SERPL: 20.3 (ref 7–25)
CALCIUM SPEC-SCNC: 9.5 MG/DL (ref 8.6–10.5)
CHLORIDE SERPL-SCNC: 101 MMOL/L (ref 98–107)
CHOLEST SERPL-MCNC: 192 MG/DL (ref 0–200)
CHROMATIN AB SERPL-ACNC: 80 IU/ML (ref 0–14)
CO2 SERPL-SCNC: 26 MMOL/L (ref 22–29)
CREAT SERPL-MCNC: 0.74 MG/DL (ref 0.57–1)
DEPRECATED RDW RBC AUTO: 45.2 FL (ref 37–54)
EGFRCR SERPLBLD CKD-EPI 2021: 91 ML/MIN/1.73
ERYTHROCYTE [DISTWIDTH] IN BLOOD BY AUTOMATED COUNT: 13 % (ref 12.3–15.4)
GLOBULIN UR ELPH-MCNC: 3.1 GM/DL
GLUCOSE SERPL-MCNC: 123 MG/DL (ref 65–99)
HCT VFR BLD AUTO: 41.2 % (ref 34–46.6)
HDLC SERPL-MCNC: 127 MG/DL (ref 40–60)
HGB BLD-MCNC: 14.1 G/DL (ref 12–15.9)
LDLC SERPL CALC-MCNC: 46 MG/DL (ref 0–100)
LDLC/HDLC SERPL: 0.33 {RATIO}
MCH RBC QN AUTO: 33.6 PG (ref 26.6–33)
MCHC RBC AUTO-ENTMCNC: 34.2 G/DL (ref 31.5–35.7)
MCV RBC AUTO: 98.1 FL (ref 79–97)
PLATELET # BLD AUTO: 136 10*3/MM3 (ref 140–450)
PMV BLD AUTO: 11.8 FL (ref 6–12)
POTASSIUM SERPL-SCNC: 3.9 MMOL/L (ref 3.5–5.2)
PROT SERPL-MCNC: 7.4 G/DL (ref 6–8.5)
RBC # BLD AUTO: 4.2 10*6/MM3 (ref 3.77–5.28)
SODIUM SERPL-SCNC: 138 MMOL/L (ref 136–145)
TRIGL SERPL-MCNC: 115 MG/DL (ref 0–150)
TSH SERPL DL<=0.05 MIU/L-ACNC: 0.5 UIU/ML (ref 0.27–4.2)
VLDLC SERPL-MCNC: 19 MG/DL (ref 5–40)
WBC NRBC COR # BLD: 7.44 10*3/MM3 (ref 3.4–10.8)

## 2023-10-26 ENCOUNTER — TELEPHONE (OUTPATIENT)
Dept: FAMILY MEDICINE CLINIC | Facility: CLINIC | Age: 63
End: 2023-10-26

## 2023-10-30 DIAGNOSIS — M05.742 RHEUMATOID ARTHRITIS INVOLVING BOTH HANDS WITH POSITIVE RHEUMATOID FACTOR: Primary | ICD-10-CM

## 2023-10-30 DIAGNOSIS — M05.741 RHEUMATOID ARTHRITIS INVOLVING BOTH HANDS WITH POSITIVE RHEUMATOID FACTOR: Primary | ICD-10-CM

## 2023-10-30 RX ORDER — PREDNISONE 10 MG/1
10 TABLET ORAL DAILY PRN
Qty: 30 TABLET | Refills: 2 | Status: SHIPPED | OUTPATIENT
Start: 2023-10-30

## 2023-11-14 ENCOUNTER — TELEPHONE (OUTPATIENT)
Dept: FAMILY MEDICINE CLINIC | Facility: CLINIC | Age: 63
End: 2023-11-14
Payer: MEDICAID

## 2023-11-14 NOTE — TELEPHONE ENCOUNTER
Caller: Michell Mckeon    Relationship: Self    Best call back number: [phone]     Who are you requesting to speak with (clinical staff, provider,  specific staff member): TAI VELA    What was the call regarding: PATIENT REQUESTS A CALL BACK TO DISCUSS CONCERNS ABOUT HER PRESCRIPTION FOR diazePAM (VALIUM) 5 MG tablet

## 2023-12-20 RX ORDER — ALENDRONATE SODIUM 70 MG/1
TABLET ORAL
Qty: 12 TABLET | Refills: 3 | Status: SHIPPED | OUTPATIENT
Start: 2023-12-20

## 2023-12-28 ENCOUNTER — TELEPHONE (OUTPATIENT)
Dept: FAMILY MEDICINE CLINIC | Facility: CLINIC | Age: 63
End: 2023-12-28

## 2023-12-28 NOTE — TELEPHONE ENCOUNTER
Hub staff attempted to follow warm transfer process and was unsuccessful     Caller: Michell Mckeon    Relationship to patient: Self    Best call back number: [phone]     Patient is needing: PATIENT IS RETURNING CALL TO OFFICE.

## 2023-12-28 NOTE — TELEPHONE ENCOUNTER
Spoke with Michell. Discuss CT scan that was performed in June. Patient states was already discussed previously.

## 2024-01-19 ENCOUNTER — TELEPHONE (OUTPATIENT)
Dept: FAMILY MEDICINE CLINIC | Facility: CLINIC | Age: 64
End: 2024-01-19
Payer: MEDICAID

## 2024-01-19 RX ORDER — FLUCONAZOLE 150 MG/1
150 TABLET ORAL ONCE
Qty: 1 TABLET | Refills: 0 | Status: SHIPPED | OUTPATIENT
Start: 2024-01-19 | End: 2024-01-19

## 2024-01-19 RX ORDER — AZITHROMYCIN 250 MG/1
TABLET, FILM COATED ORAL
Qty: 6 TABLET | Refills: 0 | Status: SHIPPED | OUTPATIENT
Start: 2024-01-19

## 2024-01-19 NOTE — TELEPHONE ENCOUNTER
Caller: Michell Mckeon    Relationship: Self    Best call back number: [phone]     What medication are you requesting: ANTIBIOTIC AND DIFLUCAN    What are your current symptoms: FEVER, YELLOW PHLEGM , CONGESTION    How long have you been experiencing symptoms: 2 DAYS    If a prescription is needed, what is your preferred pharmacy and phone number: [facility]/PHARMACY #[id number] - [place], [place] - [address] [phone] [facility] [phone] FX

## 2024-01-22 ENCOUNTER — TELEPHONE (OUTPATIENT)
Dept: FAMILY MEDICINE CLINIC | Facility: CLINIC | Age: 64
End: 2024-01-22
Payer: MEDICAID

## 2024-01-22 DIAGNOSIS — M05.742 RHEUMATOID ARTHRITIS INVOLVING BOTH HANDS WITH POSITIVE RHEUMATOID FACTOR: Primary | ICD-10-CM

## 2024-01-22 DIAGNOSIS — M05.741 RHEUMATOID ARTHRITIS INVOLVING BOTH HANDS WITH POSITIVE RHEUMATOID FACTOR: Primary | ICD-10-CM

## 2024-01-22 NOTE — TELEPHONE ENCOUNTER
Received fax requesting 90 day refill  Drug : (1CNCAP) GA/IB/LI/BA 10/3/4/2%  Gabapentin/ibuprofen/lidocaine/baclofen 10/3/4/2%.  Directions: Apply 1-2GMS (1-2 Pumps) to the affected area 3-4 times daily.

## 2025-02-05 NOTE — TELEPHONE ENCOUNTER
Caller: Michell Mckeon    Relationship: Self    Best call back number: [phone] (H)    Medication needed:   Requested Prescriptions     Pending Prescriptions Disp Refills   • acyclovir (ZOVIRAX) 400 MG tablet 50 tablet 0     Sig: Take no more than 5 doses a day.     diclofenac (VOLTAREN) 1 % gel gel  4 g, 4 Times Daily PRN         When do you need the refill by: ASAP    What additional details did the patient provide when requesting the medication: PATIENT STATES SHE HAS BEEN MISSING APPOINTMENTS BECAUSE HER ROOMMATE IS DYING BUT NEEDS MEDICATIONS REFILLED ASAP    Does the patient have less than a 3 day supply:  [x] Yes  [] No    What is the patient's preferred pharmacy: [facility]/PHARMACY #[id number] - [place] - [address] - [phone] [facility] [phone] FX    Faxed 6 months of PT records to [facility] with Anjelica at [phone]